# Patient Record
Sex: FEMALE | Race: WHITE | Employment: FULL TIME | URBAN - NONMETROPOLITAN AREA
[De-identification: names, ages, dates, MRNs, and addresses within clinical notes are randomized per-mention and may not be internally consistent; named-entity substitution may affect disease eponyms.]

---

## 2017-03-30 PROBLEM — L71.9 ROSACEA: Status: ACTIVE | Noted: 2017-03-30

## 2017-08-23 PROBLEM — E78.2 MIXED DYSLIPIDEMIA: Status: ACTIVE | Noted: 2017-08-23

## 2019-01-11 ENCOUNTER — OFFICE VISIT (OUTPATIENT)
Dept: CARDIOLOGY | Facility: CLINIC | Age: 29
End: 2019-01-11
Payer: COMMERCIAL

## 2019-01-11 VITALS
HEART RATE: 102 BPM | BODY MASS INDEX: 32.95 KG/M2 | HEIGHT: 58 IN | OXYGEN SATURATION: 94 % | WEIGHT: 157 LBS | DIASTOLIC BLOOD PRESSURE: 80 MMHG | SYSTOLIC BLOOD PRESSURE: 110 MMHG

## 2019-01-11 DIAGNOSIS — R07.89 OTHER CHEST PAIN: ICD-10-CM

## 2019-01-11 DIAGNOSIS — R00.2 PALPITATIONS: ICD-10-CM

## 2019-01-11 DIAGNOSIS — E78.2 MIXED DYSLIPIDEMIA: ICD-10-CM

## 2019-01-11 LAB — EKG IMPRESSION: NORMAL

## 2019-01-11 PROCEDURE — 93000 ELECTROCARDIOGRAM COMPLETE: CPT | Performed by: INTERNAL MEDICINE

## 2019-01-11 PROCEDURE — 99204 OFFICE O/P NEW MOD 45 MIN: CPT | Mod: 25 | Performed by: INTERNAL MEDICINE

## 2019-01-11 RX ORDER — LORAZEPAM 0.5 MG/1
0.5 TABLET ORAL EVERY 4 HOURS PRN
COMMUNITY
End: 2019-09-19 | Stop reason: SDUPTHER

## 2019-01-11 ASSESSMENT — ENCOUNTER SYMPTOMS
INSOMNIA: 1
FALLS: 0
NAUSEA: 0
ABDOMINAL PAIN: 0
WEAKNESS: 0
SHORTNESS OF BREATH: 1
CHILLS: 0
COUGH: 0
BRUISES/BLEEDS EASILY: 0
PALPITATIONS: 0
FOCAL WEAKNESS: 0
TINGLING: 1
PND: 0
BLURRED VISION: 0
CLAUDICATION: 0
DIZZINESS: 0
DEPRESSION: 1
NERVOUS/ANXIOUS: 1
FEVER: 0
SORE THROAT: 1

## 2019-01-11 NOTE — LETTER
[facility] [address],   [address]nd [address]  [place] [address]  Phone: [phone]  Fax: [phone]              Ellie Sánchez  1990    Encounter Date: 1/11/2019    Kelton Hinds M.D.          PROGRESS NOTE:  Chief Complaint   Patient presents with   • Chest Pain       Subjective:   Ellie Sánchez is a 28 y.o. female who presents today 17 days of in consultation from Dr. Nigel hodges for evaluation of her history of episodic chest pain where she double overs and sometimes feels like she even has to lie down this occurs in the central chest but is radiated into her jaw or her left arm and she is also found that her left arm gets numb with tingling of the hand this does not necessarily occur with emotional stress or anxiety that she feels but she certainly has prominent anxiety most of the time as well she is struggled long-term with her weight and she does in fact have mixed dyslipidemia with elevated LDL on prior testing and elevated triglycerides on more recent testing she has a family history of heart disease in multiple members.    Past Medical History:   Diagnosis Date   • Abdominal pain, left upper quadrant    • Anxiety    • Bronchitis    • Chest pain    • Cough    • Depression    • Early satiety    • Gastritis    • Helicobacter pylori (H. pylori)    • Jaw pain    • Migraine    • Musculoskeletal pain     Back   • Otitis media    • Right wrist pain    • Sinus disorder    • Sinusitis    • SOB (shortness of breath)    • Thrombocytosis (HCC)    • Tonsillitis    • URI (upper respiratory infection)    • Yeast infection      Past Surgical History:   Procedure Laterality Date   • ABDOMINAL EXPLORATION       Family History   Problem Relation Age of Onset   • Diabetes Mother    • Hypertension Father    • Diabetes Maternal Grandmother    • Heart Disease Maternal Grandmother    • Diabetes Paternal Grandmother    • Diabetes Paternal  Grandfather    • Cancer Paternal Grandfather      Social History     Social History   • Marital status: Single     Spouse name: N/A   • Number of children: N/A   • Years of education: N/A     Occupational History   • Not on file.     Social History Main Topics   • Smoking status: Never Smoker   • Smokeless tobacco: Never Used   • Alcohol use No   • Drug use: No   • Sexual activity: Yes     Partners: Male     Other Topics Concern   • Not on file     Social History Narrative   • No narrative on file     Allergies   Allergen Reactions   • Pcn [Penicillins] Rash   • Augmentin      diarrhea   • Zithromax [Azithromycin] Rash     Outpatient Encounter Prescriptions as of 1/11/2019   Medication Sig Dispense Refill   • LORazepam (ATIVAN) 0.5 MG Tab Take 0.5 mg by mouth every four hours as needed for Anxiety.     • promethazine-dextromethorphan (PROMETHAZINE-DM) 6.25-15 MG/5ML syrup Take 5 mL by mouth every four hours as needed for Cough. 240 mL 0   • amitriptyline (ELAVIL) 100 MG Tab TAKE ONE TABLET BY MOUTH AT BEDTIME 90 Tab 0   • TRI-SPRINTEC 0.18/0.215/0.25 MG-35 MCG Tab TAKE ONE TABLET BY MOUTH DAILY 84 Tab 1   • acyclovir (ZOVIRAX) 200 MG Cap Take 4 caps by mouth daily for 5 days 20 Cap 1   • fluconazole (DIFLUCAN) 150 MG tablet Take one pill now and may repeat once in 3 days if needed 2 Tab 1   • albuterol 108 (90 BASE) MCG/ACT Aero Soln inhalation aerosol Inhale 2 Puffs by mouth every 6 hours as needed for Shortness of Breath. 8.5 g 0   • ibuprofen (MOTRIN) 600 MG Tab take one tablet 3 times daily for 1 week and then use 3 times daily as needed after that 30 Tab 0   • omeprazole (PRILOSEC) 20 MG CPDR Take 1 Cap by mouth 2 times a day. 60 Cap 0   • doxycycline (VIBRAMYCIN) 100 MG Tab Take 1 Tab by mouth 2 times a day. 14 Tab 0   • buPROPion (WELLBUTRIN) 75 MG Tab 1 pill by mouth in am; call for further directions in 2 wks 30 Tab 1     No facility-administered encounter medications on file as of 1/11/2019.      Review of  "Systems   Constitutional: Negative for chills and fever.   HENT: Positive for sore throat.    Eyes: Negative for blurred vision.   Respiratory: Positive for shortness of breath. Negative for cough.    Cardiovascular: Negative for chest pain, palpitations, claudication, leg swelling and PND.   Gastrointestinal: Negative for abdominal pain and nausea.   Musculoskeletal: Positive for joint pain. Negative for falls.   Skin: Positive for rash.   Neurological: Positive for tingling. Negative for dizziness, focal weakness and weakness.   Endo/Heme/Allergies: Does not bruise/bleed easily.   Psychiatric/Behavioral: Positive for depression. The patient is nervous/anxious and has insomnia.         Objective:   /80 (BP Location: Right arm, Patient Position: Sitting)   Pulse (!) 102   Ht 1.473 m (4' 10\")   Wt 71.2 kg (157 lb)   SpO2 94%   BMI 32.81 kg/m²      Physical Exam   Constitutional: No distress.   Central obesity   HENT:   Mouth/Throat: Oropharynx is clear and moist. No oropharyngeal exudate.   Eyes: No scleral icterus.   Neck: No JVD present.   Cardiovascular: Normal rate and normal heart sounds.  Exam reveals no gallop and no friction rub.    No murmur heard.  Pulmonary/Chest: No respiratory distress. She has no wheezes. She has no rales.   Abdominal: Soft. Bowel sounds are normal.   Musculoskeletal: She exhibits no edema.   Neurological: She is alert.   Skin: No rash noted. She is not diaphoretic.   She describes a butterfly rash, but currently has makeup   Psychiatric: She has a normal mood and affect.     Results for orders placed or performed in visit on 19   EKG   Result Value Ref Range    Report       [facility] [place]    Test Date:  2019  Pt Name:    DUSTIN LEZAMA             Department: [facility]  MRN:        [id number]                      Room:  Gender:     Female                       Technician: ALEXANDER  :        1990                   Requested By:RAMONA DELEON" GUEVARA  Order #:    [id number]                    Reading MD:    Measurements  Intervals                                Axis  Rate:       90                           P:          50  WV:         128                          QRS:        79  QRSD:       84                           T:          28  QT:         356  QTc:        436    Interpretive Statements  SINUS RHYTHM  No previous ECG available for comparison         No results found for this or any previous visit.    We reviewed in person the recent labs    Assessment:     1. Other chest pain  EKG    EC-ECHOCARDIOGRAM REST/STRESS W/O CONT    Holter Monitor Study   2. Palpitations  Holter Monitor Study   3. Mixed dyslipidemia         Medical Decision Making:  Today's Assessment / Status / Plan:     It was my pleasure to meet with Ms. Sánchez.    She is accompanied by her grandmother    For her episodes of chest pain this would be atypical for angina it seems as likely panic disorder but certainly she has risk factors and so we should proceed with ischemic evaluation stress echo seems appropriate    For dyslipidemia gave her diet advice    For complaint of palpitations and racing heart she often does have tachycardia on her initial evaluations repeat pulse on EKG was 90 so again this may be related to anxiety we can do a 24-hour monitor to see if she has any occult arrhythmias    We will follow up with Ms. Sánchez on the results of the testing over the phone. We will determine further follow-up from there.    It is my pleasure to participate in the care of Ms. Sánchez.  Please do not hesitate to contact me with questions or concerns.    Kelton Hinds MD PhD FAC  Cardiologist [facility]        Jennifer Ardon D.O.  [address] [facility] [address]  VIA In Basket

## 2019-01-11 NOTE — PROGRESS NOTES
Chief Complaint   Patient presents with   • Chest Pain       Subjective:   Ellie Sánchez is a 28 y.o. female who presents today 17 days of in consultation from Dr. Nigel hodges for evaluation of her history of episodic chest pain where she double overs and sometimes feels like she even has to lie down this occurs in the central chest but is radiated into her jaw or her left arm and she is also found that her left arm gets numb with tingling of the hand this does not necessarily occur with emotional stress or anxiety that she feels but she certainly has prominent anxiety most of the time as well she is struggled long-term with her weight and she does in fact have mixed dyslipidemia with elevated LDL on prior testing and elevated triglycerides on more recent testing she has a family history of heart disease in multiple members.    Past Medical History:   Diagnosis Date   • Abdominal pain, left upper quadrant    • Anxiety    • Bronchitis    • Chest pain    • Cough    • Depression    • Early satiety    • Gastritis    • Helicobacter pylori (H. pylori)    • Jaw pain    • Migraine    • Musculoskeletal pain     Back   • Otitis media    • Right wrist pain    • Sinus disorder    • Sinusitis    • SOB (shortness of breath)    • Thrombocytosis (HCC)    • Tonsillitis    • URI (upper respiratory infection)    • Yeast infection      Past Surgical History:   Procedure Laterality Date   • ABDOMINAL EXPLORATION       Family History   Problem Relation Age of Onset   • Diabetes Mother    • Hypertension Father    • Diabetes Maternal Grandmother    • Heart Disease Maternal Grandmother    • Diabetes Paternal Grandmother    • Diabetes Paternal Grandfather    • Cancer Paternal Grandfather      Social History     Social History   • Marital status: Single     Spouse name: N/A   • Number of children: N/A   • Years of education: N/A     Occupational History   • Not on file.     Social History Main Topics   • Smoking status: Never Smoker   •  Smokeless tobacco: Never Used   • Alcohol use No   • Drug use: No   • Sexual activity: Yes     Partners: Male     Other Topics Concern   • Not on file     Social History Narrative   • No narrative on file     Allergies   Allergen Reactions   • Pcn [Penicillins] Rash   • Augmentin      diarrhea   • Zithromax [Azithromycin] Rash     Outpatient Encounter Prescriptions as of 1/11/2019   Medication Sig Dispense Refill   • LORazepam (ATIVAN) 0.5 MG Tab Take 0.5 mg by mouth every four hours as needed for Anxiety.     • promethazine-dextromethorphan (PROMETHAZINE-DM) 6.25-15 MG/5ML syrup Take 5 mL by mouth every four hours as needed for Cough. 240 mL 0   • amitriptyline (ELAVIL) 100 MG Tab TAKE ONE TABLET BY MOUTH AT BEDTIME 90 Tab 0   • TRI-SPRINTEC 0.18/0.215/0.25 MG-35 MCG Tab TAKE ONE TABLET BY MOUTH DAILY 84 Tab 1   • acyclovir (ZOVIRAX) 200 MG Cap Take 4 caps by mouth daily for 5 days 20 Cap 1   • fluconazole (DIFLUCAN) 150 MG tablet Take one pill now and may repeat once in 3 days if needed 2 Tab 1   • albuterol 108 (90 BASE) MCG/ACT Aero Soln inhalation aerosol Inhale 2 Puffs by mouth every 6 hours as needed for Shortness of Breath. 8.5 g 0   • ibuprofen (MOTRIN) 600 MG Tab take one tablet 3 times daily for 1 week and then use 3 times daily as needed after that 30 Tab 0   • omeprazole (PRILOSEC) 20 MG CPDR Take 1 Cap by mouth 2 times a day. 60 Cap 0   • doxycycline (VIBRAMYCIN) 100 MG Tab Take 1 Tab by mouth 2 times a day. 14 Tab 0   • buPROPion (WELLBUTRIN) 75 MG Tab 1 pill by mouth in am; call for further directions in 2 wks 30 Tab 1     No facility-administered encounter medications on file as of 1/11/2019.      Review of Systems   Constitutional: Negative for chills and fever.   HENT: Positive for sore throat.    Eyes: Negative for blurred vision.   Respiratory: Positive for shortness of breath. Negative for cough.    Cardiovascular: Negative for chest pain, palpitations, claudication, leg swelling and PND.  "  Gastrointestinal: Negative for abdominal pain and nausea.   Musculoskeletal: Positive for joint pain. Negative for falls.   Skin: Positive for rash.   Neurological: Positive for tingling. Negative for dizziness, focal weakness and weakness.   Endo/Heme/Allergies: Does not bruise/bleed easily.   Psychiatric/Behavioral: Positive for depression. The patient is nervous/anxious and has insomnia.         Objective:   /80 (BP Location: Right arm, Patient Position: Sitting)   Pulse (!) 102   Ht 1.473 m (4' 10\")   Wt 71.2 kg (157 lb)   SpO2 94%   BMI 32.81 kg/m²      Physical Exam   Constitutional: No distress.   Central obesity   HENT:   Mouth/Throat: Oropharynx is clear and moist. No oropharyngeal exudate.   Eyes: No scleral icterus.   Neck: No JVD present.   Cardiovascular: Normal rate and normal heart sounds.  Exam reveals no gallop and no friction rub.    No murmur heard.  Pulmonary/Chest: No respiratory distress. She has no wheezes. She has no rales.   Abdominal: Soft. Bowel sounds are normal.   Musculoskeletal: She exhibits no edema.   Neurological: She is alert.   Skin: No rash noted. She is not diaphoretic.   She describes a butterfly rash, but currently has makeup   Psychiatric: She has a normal mood and affect.     Results for orders placed or performed in visit on 19   EKG   Result Value Ref Range    Report       [facility] [place]    Test Date:  2019  Pt Name:    DUSTIN LEZAMA             Department: [facility]  MRN:        [id number]                      Room:  Gender:     Female                       Technician: ALEXANDER  :        1990                   Requested By:RAMONA WATERMAN  Order #:    [id number]                    Reading MD:    Measurements  Intervals                                Axis  Rate:       90                           P:          50  IA:         128                          QRS:        79  QRSD:       84                           T:          28  QT:    "      356  QTc:        436    Interpretive Statements  SINUS RHYTHM  No previous ECG available for comparison         No results found for this or any previous visit.    We reviewed in person the recent labs    Assessment:     1. Other chest pain  EKG    EC-ECHOCARDIOGRAM REST/STRESS W/O CONT    Holter Monitor Study   2. Palpitations  Holter Monitor Study   3. Mixed dyslipidemia         Medical Decision Making:  Today's Assessment / Status / Plan:     It was my pleasure to meet with Ms. Sánchez.    She is accompanied by her grandmother    For her episodes of chest pain this would be atypical for angina it seems as likely panic disorder but certainly she has risk factors and so we should proceed with ischemic evaluation stress echo seems appropriate    For dyslipidemia gave her diet advice    For complaint of palpitations and racing heart she often does have tachycardia on her initial evaluations repeat pulse on EKG was 90 so again this may be related to anxiety we can do a 24-hour monitor to see if she has any occult arrhythmias    We will follow up with Ms. Sánchez on the results of the testing over the phone. We will determine further follow-up from there.    It is my pleasure to participate in the care of Ms. Sánchez.  Please do not hesitate to contact me with questions or concerns.    Kelton Hinds MD PhD FAC  Cardiologist [facility]

## 2019-01-11 NOTE — PATIENT INSTRUCTIONS
Lowering total cholesterol and LDL (bad) cholesterol:  - Eat leaner cuts of meat, or eliminate altogether if possible red meat, and frequently substitute fish or chicken.  - Limit saturated fat to no more than 7-10% of total calories no more than 10 g per day is recommended. Some sources of saturated fat include butter, animal fats, hydrogenated vegetable fats and oils, many desserts, whole milk dairy products.  - Replaced saturated fats with polyunsaturated fats and monounsaturated fats. Foods high in monounsaturated fat include nuts, although well, canola oil, avocados, and olives.  - Limit trans fat (processed foods) and replaced with fresh fruits and vegetables  - Recommend nonfat dairy products  - Increase substantially the amount of soluble fiber intake (legumes such as beans, fruit, whole grains).  - Consider nutritional supplements: plant sterile spreads such as Benecol, fish oil,  flaxseed oil, omega-3 acids capsules 1000 mg twice a day, or viscous fiber such as Metamucil  - Attain ideal weight and regular exercise (at least 30 minutes per day of walking)    Lowering triglycerides:  - Reduce intake of simple sugar: Desserts, candy, pastries, honey, sodas, sugared cereals, yogurt, Gatorade, sports bars, canned fruit, smoothies, fruit juice, coffee drinks  - Reduced intake of refined starches: Refined Pasta  - Reduce or abstain from alcohol  - Increase omega-3 fatty acids: [place], Trout, Mackerel, [place]ring, [place] tuna and supplements  - Attain ideal weight and regular exercise (at least 30 minutes per day of walking)      Elevating HDL (good) cholesterol:  - Increase physical activity  - Seasoned foods with garlic and onions  - Increase omega-3 fatty acids and supplements as listed above  - Incorporating appropriate amounts of monounsaturated fats such as nuts, olive oil, canola oil, avocados, olives  - Stop smoking  - Attain ideal weight and regular exercise (at least 30 minutes per day of walking)

## 2019-01-25 ENCOUNTER — NON-PROVIDER VISIT (OUTPATIENT)
Dept: CARDIOLOGY | Facility: CLINIC | Age: 29
End: 2019-01-25
Payer: COMMERCIAL

## 2019-01-25 DIAGNOSIS — R00.0 SINUS TACHYCARDIA: ICD-10-CM

## 2019-01-25 DIAGNOSIS — R00.2 PALPITATIONS: ICD-10-CM

## 2019-01-25 DIAGNOSIS — R07.89 OTHER CHEST PAIN: ICD-10-CM

## 2019-01-25 PROCEDURE — 93224 XTRNL ECG REC UP TO 48 HRS: CPT | Performed by: INTERNAL MEDICINE

## 2019-02-01 ENCOUNTER — TELEPHONE (OUTPATIENT)
Dept: CARDIOLOGY | Facility: MEDICAL CENTER | Age: 29
End: 2019-02-01

## 2019-02-01 LAB — EKG IMPRESSION: NORMAL

## 2019-02-01 NOTE — TELEPHONE ENCOUNTER
Holter Monitor Study   Order: [id number]   Status:  Final result   Visible to patient:  Yes (MyChart)  Dx:  Palpitations; Other chest pain   Notes recorded by Kelton Hinds M.D. on 2/1/2019 at 3:09 PM PST    The holter test looks good, please let her know     Thank you     Returned patient call and reviewed MD recommendations.  She states no other concerns or questions at this time.  Pt verbalizes understanding and is appreciative of information given.

## 2019-02-05 ENCOUNTER — APPOINTMENT (OUTPATIENT)
Dept: CARDIOLOGY | Facility: MEDICAL CENTER | Age: 29
End: 2019-02-05
Attending: INTERNAL MEDICINE
Payer: COMMERCIAL

## 2019-02-19 ENCOUNTER — HOSPITAL ENCOUNTER (OUTPATIENT)
Dept: CARDIOLOGY | Facility: MEDICAL CENTER | Age: 29
End: 2019-02-19
Attending: INTERNAL MEDICINE
Payer: COMMERCIAL

## 2019-02-19 DIAGNOSIS — R07.89 OTHER CHEST PAIN: ICD-10-CM

## 2019-02-19 LAB — LV EJECT FRACT  99904: 55

## 2019-02-19 PROCEDURE — 93350 STRESS TTE ONLY: CPT | Mod: 26 | Performed by: INTERNAL MEDICINE

## 2019-02-19 PROCEDURE — 93017 CV STRESS TEST TRACING ONLY: CPT

## 2019-02-19 PROCEDURE — 93018 CV STRESS TEST I&R ONLY: CPT | Performed by: INTERNAL MEDICINE

## 2021-02-17 PROBLEM — M32.9 SLE (SYSTEMIC LUPUS ERYTHEMATOSUS RELATED SYNDROME) (HCC): Status: ACTIVE | Noted: 2021-02-17

## 2021-02-17 PROBLEM — D75.839 THROMBOCYTOSIS: Status: ACTIVE | Noted: 2021-02-17

## 2023-10-04 PROBLEM — D72.829 LEUKOCYTOSIS: Status: ACTIVE | Noted: 2023-10-04

## 2024-07-05 ENCOUNTER — APPOINTMENT (OUTPATIENT)
Dept: RADIOLOGY | Facility: MEDICAL CENTER | Age: 34
DRG: 023 | End: 2024-07-05
Attending: RADIOLOGY
Payer: MEDICAID

## 2024-07-05 ENCOUNTER — HOSPITAL ENCOUNTER (OUTPATIENT)
Dept: RADIOLOGY | Facility: MEDICAL CENTER | Age: 34
End: 2024-07-05

## 2024-07-05 ENCOUNTER — APPOINTMENT (OUTPATIENT)
Dept: CARDIOLOGY | Facility: MEDICAL CENTER | Age: 34
DRG: 023 | End: 2024-07-05
Attending: INTERNAL MEDICINE
Payer: MEDICAID

## 2024-07-05 ENCOUNTER — APPOINTMENT (OUTPATIENT)
Dept: RADIOLOGY | Facility: MEDICAL CENTER | Age: 34
DRG: 023 | End: 2024-07-05
Attending: PSYCHIATRY & NEUROLOGY
Payer: MEDICAID

## 2024-07-05 ENCOUNTER — APPOINTMENT (OUTPATIENT)
Dept: RADIOLOGY | Facility: MEDICAL CENTER | Age: 34
DRG: 023 | End: 2024-07-05
Attending: EMERGENCY MEDICINE
Payer: MEDICAID

## 2024-07-05 ENCOUNTER — APPOINTMENT (OUTPATIENT)
Dept: RADIOLOGY | Facility: MEDICAL CENTER | Age: 34
DRG: 023 | End: 2024-07-05
Attending: INTERNAL MEDICINE
Payer: MEDICAID

## 2024-07-05 ENCOUNTER — HOSPITAL ENCOUNTER (INPATIENT)
Facility: MEDICAL CENTER | Age: 34
LOS: 10 days | DRG: 023 | End: 2024-07-15
Attending: EMERGENCY MEDICINE | Admitting: INTERNAL MEDICINE
Payer: MEDICAID

## 2024-07-05 DIAGNOSIS — M25.50 ARTHRALGIA, UNSPECIFIED JOINT: ICD-10-CM

## 2024-07-05 DIAGNOSIS — E78.2 MIXED DYSLIPIDEMIA: ICD-10-CM

## 2024-07-05 DIAGNOSIS — I63.511 ACUTE ISCHEMIC RIGHT MCA STROKE (HCC): ICD-10-CM

## 2024-07-05 DIAGNOSIS — F41.9 ANXIETY: ICD-10-CM

## 2024-07-05 DIAGNOSIS — I63.411 CEREBROVASCULAR ACCIDENT (CVA) DUE TO EMBOLISM OF RIGHT MIDDLE CEREBRAL ARTERY (HCC): Primary | ICD-10-CM

## 2024-07-05 PROBLEM — E83.42 HYPOMAGNESEMIA: Status: ACTIVE | Noted: 2024-07-05

## 2024-07-05 LAB
ALBUMIN SERPL BCP-MCNC: 4.1 G/DL (ref 3.2–4.9)
ALBUMIN/GLOB SERPL: 1.4 G/DL
ALP SERPL-CCNC: 70 U/L (ref 30–99)
ALT SERPL-CCNC: 16 U/L (ref 2–50)
ANION GAP SERPL CALC-SCNC: 13 MMOL/L (ref 7–16)
APTT PPP: 21.7 SEC (ref 24.7–36)
APTT PPP: <20 SEC (ref 24.7–36)
AST SERPL-CCNC: 25 U/L (ref 12–45)
BASOPHILS # BLD AUTO: 0.2 % (ref 0–1.8)
BASOPHILS # BLD: 0.03 K/UL (ref 0–0.12)
BILIRUB SERPL-MCNC: 0.3 MG/DL (ref 0.1–1.5)
BUN SERPL-MCNC: 8 MG/DL (ref 8–22)
CALCIUM ALBUM COR SERPL-MCNC: 8.7 MG/DL (ref 8.5–10.5)
CALCIUM SERPL-MCNC: 8.8 MG/DL (ref 8.5–10.5)
CHLORIDE SERPL-SCNC: 107 MMOL/L (ref 96–112)
CO2 SERPL-SCNC: 19 MMOL/L (ref 20–33)
CREAT SERPL-MCNC: 0.59 MG/DL (ref 0.5–1.4)
EOSINOPHIL # BLD AUTO: 0.35 K/UL (ref 0–0.51)
EOSINOPHIL NFR BLD: 2.9 % (ref 0–6.9)
ERYTHROCYTE [DISTWIDTH] IN BLOOD BY AUTOMATED COUNT: 46.9 FL (ref 35.9–50)
EST. AVERAGE GLUCOSE BLD GHB EST-MCNC: 94 MG/DL
GFR SERPLBLD CREATININE-BSD FMLA CKD-EPI: 120 ML/MIN/1.73 M 2
GLOBULIN SER CALC-MCNC: 3 G/DL (ref 1.9–3.5)
GLUCOSE SERPL-MCNC: 112 MG/DL (ref 65–99)
HBA1C MFR BLD: 4.9 % (ref 4–5.6)
HCT VFR BLD AUTO: 37.9 % (ref 37–47)
HGB BLD-MCNC: 12.9 G/DL (ref 12–16)
HOLDING TUBE BB 8507: NORMAL
IMM GRANULOCYTES # BLD AUTO: 0.07 K/UL (ref 0–0.11)
IMM GRANULOCYTES NFR BLD AUTO: 0.6 % (ref 0–0.9)
INR PPP: 1 (ref 0.87–1.13)
INR PPP: 1.06 (ref 0.87–1.13)
LA PPP-IMP: ABNORMAL
LMWH PPP CHRO-ACNC: <0.1 U/ML
LYMPHOCYTES # BLD AUTO: 1.4 K/UL (ref 1–4.8)
LYMPHOCYTES NFR BLD: 11.4 % (ref 22–41)
MAGNESIUM SERPL-MCNC: 1.8 MG/DL (ref 1.5–2.5)
MCH RBC QN AUTO: 32.1 PG (ref 27–33)
MCHC RBC AUTO-ENTMCNC: 34 G/DL (ref 32.2–35.5)
MCV RBC AUTO: 94.3 FL (ref 81.4–97.8)
MONOCYTES # BLD AUTO: 0.54 K/UL (ref 0–0.85)
MONOCYTES NFR BLD AUTO: 4.4 % (ref 0–13.4)
NEUTROPHILS # BLD AUTO: 9.88 K/UL (ref 1.82–7.42)
NEUTROPHILS NFR BLD: 80.5 % (ref 44–72)
NRBC # BLD AUTO: 0 K/UL
NRBC BLD-RTO: 0 /100 WBC (ref 0–0.2)
PLATELET # BLD AUTO: 526 K/UL (ref 164–446)
PMV BLD AUTO: 9 FL (ref 9–12.9)
POTASSIUM SERPL-SCNC: 4.1 MMOL/L (ref 3.6–5.5)
PROT SERPL-MCNC: 7.1 G/DL (ref 6–8.2)
PROTHROMBIN TIME: 13.3 SEC (ref 12–14.6)
PROTHROMBIN TIME: 13.9 SEC (ref 12–14.6)
RBC # BLD AUTO: 4.02 M/UL (ref 4.2–5.4)
SCREEN DRVVT: 31.2 SEC (ref 28–48)
SODIUM SERPL-SCNC: 139 MMOL/L (ref 135–145)
WBC # BLD AUTO: 12.3 K/UL (ref 4.8–10.8)

## 2024-07-05 PROCEDURE — 36415 COLL VENOUS BLD VENIPUNCTURE: CPT

## 2024-07-05 PROCEDURE — 70496 CT ANGIOGRAPHY HEAD: CPT

## 2024-07-05 PROCEDURE — 700101 HCHG RX REV CODE 250: Performed by: INTERNAL MEDICINE

## 2024-07-05 PROCEDURE — 86146 BETA-2 GLYCOPROTEIN ANTIBODY: CPT

## 2024-07-05 PROCEDURE — 03CG3ZZ EXTIRPATION OF MATTER FROM INTRACRANIAL ARTERY, PERCUTANEOUS APPROACH: ICD-10-PCS | Performed by: RADIOLOGY

## 2024-07-05 PROCEDURE — 700111 HCHG RX REV CODE 636 W/ 250 OVERRIDE (IP): Performed by: NURSE PRACTITIONER

## 2024-07-05 PROCEDURE — 94760 N-INVAS EAR/PLS OXIMETRY 1: CPT

## 2024-07-05 PROCEDURE — 85520 HEPARIN ASSAY: CPT

## 2024-07-05 PROCEDURE — 700111 HCHG RX REV CODE 636 W/ 250 OVERRIDE (IP): Mod: JZ

## 2024-07-05 PROCEDURE — 70551 MRI BRAIN STEM W/O DYE: CPT

## 2024-07-05 PROCEDURE — 80053 COMPREHEN METABOLIC PANEL: CPT | Mod: 91

## 2024-07-05 PROCEDURE — 700117 HCHG RX CONTRAST REV CODE 255: Performed by: RADIOLOGY

## 2024-07-05 PROCEDURE — 85025 COMPLETE CBC W/AUTO DIFF WBC: CPT | Mod: 91

## 2024-07-05 PROCEDURE — 83735 ASSAY OF MAGNESIUM: CPT

## 2024-07-05 PROCEDURE — 700111 HCHG RX REV CODE 636 W/ 250 OVERRIDE (IP): Mod: JZ | Performed by: PSYCHIATRY & NEUROLOGY

## 2024-07-05 PROCEDURE — 96374 THER/PROPH/DIAG INJ IV PUSH: CPT

## 2024-07-05 PROCEDURE — 99291 CRITICAL CARE FIRST HOUR: CPT

## 2024-07-05 PROCEDURE — 0042T CT-CEREBRAL PERFUSION ANALYSIS: CPT

## 2024-07-05 PROCEDURE — 93306 TTE W/DOPPLER COMPLETE: CPT

## 2024-07-05 PROCEDURE — 86147 CARDIOLIPIN ANTIBODY EA IG: CPT

## 2024-07-05 PROCEDURE — 99255 IP/OBS CONSLTJ NEW/EST HI 80: CPT | Performed by: PSYCHIATRY & NEUROLOGY

## 2024-07-05 PROCEDURE — 85610 PROTHROMBIN TIME: CPT | Mod: 91

## 2024-07-05 PROCEDURE — 85730 THROMBOPLASTIN TIME PARTIAL: CPT | Mod: 91

## 2024-07-05 PROCEDURE — 96375 TX/PRO/DX INJ NEW DRUG ADDON: CPT

## 2024-07-05 PROCEDURE — 700105 HCHG RX REV CODE 258: Performed by: NURSE PRACTITIONER

## 2024-07-05 PROCEDURE — 700111 HCHG RX REV CODE 636 W/ 250 OVERRIDE (IP): Performed by: INTERNAL MEDICINE

## 2024-07-05 PROCEDURE — 83036 HEMOGLOBIN GLYCOSYLATED A1C: CPT

## 2024-07-05 PROCEDURE — 3E03317 INTRODUCTION OF OTHER THROMBOLYTIC INTO PERIPHERAL VEIN, PERCUTANEOUS APPROACH: ICD-10-PCS | Performed by: PSYCHIATRY & NEUROLOGY

## 2024-07-05 PROCEDURE — 700105 HCHG RX REV CODE 258: Performed by: INTERNAL MEDICINE

## 2024-07-05 PROCEDURE — C1760 CLOSURE DEV, VASC: HCPCS

## 2024-07-05 PROCEDURE — 770022 HCHG ROOM/CARE - ICU (200)

## 2024-07-05 PROCEDURE — 72191 CT ANGIOGRAPH PELV W/O&W/DYE: CPT

## 2024-07-05 PROCEDURE — 700117 HCHG RX CONTRAST REV CODE 255: Performed by: EMERGENCY MEDICINE

## 2024-07-05 PROCEDURE — 700111 HCHG RX REV CODE 636 W/ 250 OVERRIDE (IP): Mod: UD | Performed by: PSYCHIATRY & NEUROLOGY

## 2024-07-05 PROCEDURE — 85613 RUSSELL VIPER VENOM DILUTED: CPT

## 2024-07-05 PROCEDURE — 700111 HCHG RX REV CODE 636 W/ 250 OVERRIDE (IP)

## 2024-07-05 PROCEDURE — 70498 CT ANGIOGRAPHY NECK: CPT

## 2024-07-05 PROCEDURE — 71045 X-RAY EXAM CHEST 1 VIEW: CPT

## 2024-07-05 PROCEDURE — 93306 TTE W/DOPPLER COMPLETE: CPT | Mod: 26 | Performed by: INTERNAL MEDICINE

## 2024-07-05 PROCEDURE — 99291 CRITICAL CARE FIRST HOUR: CPT | Performed by: INTERNAL MEDICINE

## 2024-07-05 RX ORDER — ROSUVASTATIN CALCIUM 5 MG/1
5 TABLET, COATED ORAL EVERY EVENING
Status: DISCONTINUED | OUTPATIENT
Start: 2024-07-05 | End: 2024-07-15 | Stop reason: HOSPADM

## 2024-07-05 RX ORDER — LORAZEPAM 2 MG/ML
.5-1 INJECTION INTRAMUSCULAR EVERY 4 HOURS PRN
Status: DISCONTINUED | OUTPATIENT
Start: 2024-07-05 | End: 2024-07-07

## 2024-07-05 RX ORDER — PROCHLORPERAZINE EDISYLATE 5 MG/ML
5-10 INJECTION INTRAMUSCULAR; INTRAVENOUS EVERY 4 HOURS PRN
Status: DISCONTINUED | OUTPATIENT
Start: 2024-07-05 | End: 2024-07-15 | Stop reason: HOSPADM

## 2024-07-05 RX ORDER — LABETALOL HYDROCHLORIDE 5 MG/ML
10 INJECTION, SOLUTION INTRAVENOUS
Status: DISCONTINUED | OUTPATIENT
Start: 2024-07-05 | End: 2024-07-15 | Stop reason: HOSPADM

## 2024-07-05 RX ORDER — ONDANSETRON 4 MG/1
4 TABLET, ORALLY DISINTEGRATING ORAL EVERY 4 HOURS PRN
Status: DISCONTINUED | OUTPATIENT
Start: 2024-07-05 | End: 2024-07-15 | Stop reason: HOSPADM

## 2024-07-05 RX ORDER — HYDROCODONE BITARTRATE AND ACETAMINOPHEN 5; 325 MG/1; MG/1
1 TABLET ORAL EVERY 8 HOURS PRN
Status: DISCONTINUED | OUTPATIENT
Start: 2024-07-05 | End: 2024-07-15 | Stop reason: HOSPADM

## 2024-07-05 RX ORDER — HYDRALAZINE HYDROCHLORIDE 20 MG/ML
10 INJECTION INTRAMUSCULAR; INTRAVENOUS
Status: DISCONTINUED | OUTPATIENT
Start: 2024-07-05 | End: 2024-07-15 | Stop reason: HOSPADM

## 2024-07-05 RX ORDER — PROMETHAZINE HYDROCHLORIDE 25 MG/1
12.5-25 SUPPOSITORY RECTAL EVERY 4 HOURS PRN
Status: DISCONTINUED | OUTPATIENT
Start: 2024-07-05 | End: 2024-07-15 | Stop reason: HOSPADM

## 2024-07-05 RX ORDER — HYDROMORPHONE HYDROCHLORIDE 1 MG/ML
0.5 INJECTION, SOLUTION INTRAMUSCULAR; INTRAVENOUS; SUBCUTANEOUS ONCE
Status: COMPLETED | OUTPATIENT
Start: 2024-07-05 | End: 2024-07-05

## 2024-07-05 RX ORDER — DOCUSATE SODIUM 100 MG/1
100 CAPSULE, LIQUID FILLED ORAL 2 TIMES DAILY
COMMUNITY

## 2024-07-05 RX ORDER — SODIUM CHLORIDE 9 MG/ML
500 INJECTION, SOLUTION INTRAVENOUS
Status: ACTIVE | OUTPATIENT
Start: 2024-07-05 | End: 2024-07-05

## 2024-07-05 RX ORDER — DOCUSATE SODIUM 100 MG/1
100 CAPSULE, LIQUID FILLED ORAL 2 TIMES DAILY
Status: DISCONTINUED | OUTPATIENT
Start: 2024-07-05 | End: 2024-07-15 | Stop reason: HOSPADM

## 2024-07-05 RX ORDER — ASPIRIN 81 MG/1
81 TABLET ORAL DAILY
Status: DISCONTINUED | OUTPATIENT
Start: 2024-07-06 | End: 2024-07-15 | Stop reason: HOSPADM

## 2024-07-05 RX ORDER — HYDROCODONE BITARTRATE AND ACETAMINOPHEN 5; 325 MG/1; MG/1
1 TABLET ORAL
Status: DISCONTINUED | OUTPATIENT
Start: 2024-07-05 | End: 2024-07-05

## 2024-07-05 RX ORDER — GABAPENTIN 300 MG/1
300 CAPSULE ORAL 2 TIMES DAILY
Status: DISCONTINUED | OUTPATIENT
Start: 2024-07-05 | End: 2024-07-15 | Stop reason: HOSPADM

## 2024-07-05 RX ORDER — NOREPINEPHRINE BITARTRATE 0.03 MG/ML
0-1 INJECTION, SOLUTION INTRAVENOUS CONTINUOUS
Status: DISCONTINUED | OUTPATIENT
Start: 2024-07-05 | End: 2024-07-11

## 2024-07-05 RX ORDER — AMITRIPTYLINE HYDROCHLORIDE 100 MG/1
100 TABLET ORAL
Status: DISCONTINUED | OUTPATIENT
Start: 2024-07-05 | End: 2024-07-11

## 2024-07-05 RX ORDER — PROMETHAZINE HYDROCHLORIDE 25 MG/1
12.5-25 TABLET ORAL EVERY 4 HOURS PRN
Status: DISCONTINUED | OUTPATIENT
Start: 2024-07-05 | End: 2024-07-15 | Stop reason: HOSPADM

## 2024-07-05 RX ORDER — MIDAZOLAM HYDROCHLORIDE 1 MG/ML
.5-2 INJECTION INTRAMUSCULAR; INTRAVENOUS PRN
Status: ACTIVE | OUTPATIENT
Start: 2024-07-05 | End: 2024-07-05

## 2024-07-05 RX ORDER — MIDAZOLAM HYDROCHLORIDE 1 MG/ML
INJECTION INTRAMUSCULAR; INTRAVENOUS
Status: COMPLETED
Start: 2024-07-05 | End: 2024-07-05

## 2024-07-05 RX ORDER — ATORVASTATIN CALCIUM 80 MG/1
80 TABLET, FILM COATED ORAL EVERY EVENING
Status: DISCONTINUED | OUTPATIENT
Start: 2024-07-05 | End: 2024-07-05

## 2024-07-05 RX ORDER — SODIUM CHLORIDE, SODIUM LACTATE, POTASSIUM CHLORIDE, CALCIUM CHLORIDE 600; 310; 30; 20 MG/100ML; MG/100ML; MG/100ML; MG/100ML
INJECTION, SOLUTION INTRAVENOUS CONTINUOUS
Status: DISCONTINUED | OUTPATIENT
Start: 2024-07-05 | End: 2024-07-07

## 2024-07-05 RX ORDER — ENOXAPARIN SODIUM 100 MG/ML
1 INJECTION SUBCUTANEOUS EVERY 12 HOURS
Status: DISCONTINUED | OUTPATIENT
Start: 2024-07-05 | End: 2024-07-12

## 2024-07-05 RX ORDER — FOLIC ACID 1 MG/1
2 TABLET ORAL DAILY
Status: DISCONTINUED | OUTPATIENT
Start: 2024-07-05 | End: 2024-07-15 | Stop reason: HOSPADM

## 2024-07-05 RX ORDER — ACETAMINOPHEN 325 MG/1
650 TABLET ORAL EVERY 6 HOURS PRN
Status: DISCONTINUED | OUTPATIENT
Start: 2024-07-05 | End: 2024-07-15 | Stop reason: HOSPADM

## 2024-07-05 RX ORDER — LORAZEPAM 2 MG/ML
INJECTION INTRAMUSCULAR
Status: COMPLETED
Start: 2024-07-05 | End: 2024-07-05

## 2024-07-05 RX ORDER — HYDROXYCHLOROQUINE SULFATE 200 MG/1
200 TABLET, FILM COATED ORAL DAILY
Status: DISCONTINUED | OUTPATIENT
Start: 2024-07-05 | End: 2024-07-05

## 2024-07-05 RX ORDER — METHOTREXATE 2.5 MG/1
20 TABLET ORAL
Status: DISCONTINUED | OUTPATIENT
Start: 2024-07-05 | End: 2024-07-05

## 2024-07-05 RX ORDER — MAGNESIUM SULFATE HEPTAHYDRATE 40 MG/ML
2 INJECTION, SOLUTION INTRAVENOUS ONCE
Status: COMPLETED | OUTPATIENT
Start: 2024-07-05 | End: 2024-07-06

## 2024-07-05 RX ORDER — ONDANSETRON 2 MG/ML
4 INJECTION INTRAMUSCULAR; INTRAVENOUS EVERY 4 HOURS PRN
Status: DISCONTINUED | OUTPATIENT
Start: 2024-07-05 | End: 2024-07-15 | Stop reason: HOSPADM

## 2024-07-05 RX ADMIN — MAGNESIUM SULFATE HEPTAHYDRATE 2 G: 2 INJECTION, SOLUTION INTRAVENOUS at 22:37

## 2024-07-05 RX ADMIN — NOREPINEPHRINE BITARTRATE 0.05 MCG/KG/MIN: 1 INJECTION, SOLUTION, CONCENTRATE INTRAVENOUS at 09:36

## 2024-07-05 RX ADMIN — ONDANSETRON 4 MG: 2 INJECTION INTRAMUSCULAR; INTRAVENOUS at 18:39

## 2024-07-05 RX ADMIN — FENTANYL CITRATE 25 MCG: 50 INJECTION, SOLUTION INTRAMUSCULAR; INTRAVENOUS at 06:42

## 2024-07-05 RX ADMIN — MIDAZOLAM HYDROCHLORIDE 0.5 MG: 1 INJECTION, SOLUTION INTRAMUSCULAR; INTRAVENOUS at 06:42

## 2024-07-05 RX ADMIN — ENOXAPARIN SODIUM 80 MG: 100 INJECTION SUBCUTANEOUS at 14:35

## 2024-07-05 RX ADMIN — IOHEXOL 100 ML: 350 INJECTION, SOLUTION INTRAVENOUS at 21:20

## 2024-07-05 RX ADMIN — LORAZEPAM 1 MG: 2 INJECTION INTRAMUSCULAR; INTRAVENOUS at 20:35

## 2024-07-05 RX ADMIN — ONDANSETRON 4 MG: 2 INJECTION INTRAMUSCULAR; INTRAVENOUS at 12:13

## 2024-07-05 RX ADMIN — PROCHLORPERAZINE EDISYLATE 5 MG: 5 INJECTION INTRAMUSCULAR; INTRAVENOUS at 13:22

## 2024-07-05 RX ADMIN — NOREPINEPHRINE BITARTRATE 0.08 MCG/KG/MIN: 1 INJECTION, SOLUTION, CONCENTRATE INTRAVENOUS at 12:00

## 2024-07-05 RX ADMIN — LORAZEPAM 1 MG: 2 INJECTION INTRAMUSCULAR; INTRAVENOUS at 08:18

## 2024-07-05 RX ADMIN — IOHEXOL 40 ML: 350 INJECTION, SOLUTION INTRAVENOUS at 05:10

## 2024-07-05 RX ADMIN — HYDROMORPHONE HYDROCHLORIDE 0.5 MG: 1 INJECTION, SOLUTION INTRAMUSCULAR; INTRAVENOUS; SUBCUTANEOUS at 12:13

## 2024-07-05 RX ADMIN — SODIUM CHLORIDE, POTASSIUM CHLORIDE, SODIUM LACTATE AND CALCIUM CHLORIDE: 600; 310; 30; 20 INJECTION, SOLUTION INTRAVENOUS at 22:31

## 2024-07-05 RX ADMIN — IOHEXOL 50 ML: 300 INJECTION, SOLUTION INTRAVENOUS at 08:00

## 2024-07-05 RX ADMIN — FENTANYL CITRATE 25 MCG: 50 INJECTION, SOLUTION INTRAMUSCULAR; INTRAVENOUS at 07:15

## 2024-07-05 RX ADMIN — IOHEXOL 65 ML: 350 INJECTION, SOLUTION INTRAVENOUS at 05:12

## 2024-07-05 RX ADMIN — MIDAZOLAM HYDROCHLORIDE 0.5 MG: 1 INJECTION INTRAMUSCULAR; INTRAVENOUS at 06:42

## 2024-07-05 RX ADMIN — TENECTEPLASE 21 MG: KIT at 05:28

## 2024-07-05 ASSESSMENT — ENCOUNTER SYMPTOMS
SPEECH CHANGE: 0
DEPRESSION: 0
SPUTUM PRODUCTION: 0
MYALGIAS: 0
ABDOMINAL PAIN: 0
BLURRED VISION: 0
BRUISES/BLEEDS EASILY: 0
COUGH: 0
SHORTNESS OF BREATH: 0
NERVOUS/ANXIOUS: 0
FOCAL WEAKNESS: 1
BACK PAIN: 0
CHILLS: 0
FEVER: 0
DIZZINESS: 1
VOMITING: 0
SORE THROAT: 0
NAUSEA: 0
PALPITATIONS: 0
SENSORY CHANGE: 1
HEADACHES: 0

## 2024-07-05 ASSESSMENT — PAIN DESCRIPTION - PAIN TYPE
TYPE: ACUTE PAIN

## 2024-07-05 ASSESSMENT — FIBROSIS 4 INDEX
FIB4 SCORE: .3111111111111111111
FIB4 SCORE: 0.42

## 2024-07-05 NOTE — CONSULTS
Critical Care Consultation    Date of consult: 7/5/2024    Referring Physician  Adria Lawson M.D.    Reason for Consultation  R MCA CVA with M2 thrombus    History of Presenting Illness  33 y.o. 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  She presented to [facility] complaining of sudden onset of left-sided weakness and confusion at 2330 last night.  She was seen in the emergency department at [facility] [place] but symptoms resolved at that time.  She had a normal head CT without contrast but a CTA showed a right M3 occlusion.  She was transferred to [facility] without requiring thrombolytics.  While in the ED, she again developed symptoms and a repeat CTA at that time showed an M2 occlusion for which she was administered TNK and went to IR for mechanical thrombectomy.  Unfortunately the thrombectomy was unsuccessful and resulted in an ICA dissection and essentially TICI 0 flow.  She was brought to the intensive care unit and I was consulted for critical care management.   - sinus tach   - TNK @0528, thrombectomy 0725   - RASS (-)1, follows weaker on L side   - anxious   - SBP    - WBC elevated    Code Status  Full Code    Review of Systems  Review of Systems   Constitutional:  Negative for chills, fever and malaise/fatigue.   HENT:  Negative for congestion and sore throat.    Eyes:  Negative for blurred vision.   Respiratory:  Negative for cough, sputum production and shortness of breath.    Cardiovascular:  Negative for chest pain, palpitations and leg swelling.   Gastrointestinal:  Negative for abdominal pain, nausea and vomiting.   Genitourinary:  Negative for dysuria.   Musculoskeletal:  Negative for back pain and myalgias.   Skin:  Negative for rash.   Neurological:  Positive for dizziness, sensory change and focal weakness. Negative for  speech change and headaches.   Endo/Heme/Allergies:  Does not bruise/bleed easily.   Psychiatric/Behavioral:  Negative for depression. The patient is not nervous/anxious.    All other systems reviewed and are negative.      Past Medical History   has a past medical history of Abdominal pain, left upper quadrant, Anxiety, Bronchitis, Chest pain, Cough, Depression, Early satiety, Gastritis, Helicobacter pylori (H. pylori), Jaw pain, Migraine, Musculoskeletal pain, Otitis media, Right wrist pain, Sinus disorder, Sinusitis, SOB (shortness of breath), Thrombocytosis, Tonsillitis, URI (upper respiratory infection), and Yeast infection.    Surgical History   has a past surgical history that includes abdominal exploration.    Family History  family history includes Cancer in her paternal grandfather; Diabetes in her maternal grandmother, mother, paternal grandfather, and paternal grandmother; Heart Disease in her maternal grandmother; Hypertension in her father.    Social History   reports that she has never smoked. She has never used smokeless tobacco. She reports that she does not drink alcohol and does not use drugs.    Medications  Home Medications       Reviewed by Floresita Alves ([facility]) on 07/05/24 at 0604  Med List Status: Complete     Medication Last Dose Status   acetaminophen/caffeine/butalbital 300-40-50 mg (FIORICET) -40 MG Cap capsule unknown Active   amitriptyline (ELAVIL) 100 MG Tab unknown Active   docusate sodium (COLACE) 100 MG Cap unknown Active   folic acid (FOLVITE) 1 MG Tab unknown Active   gabapentin (NEURONTIN) 300 MG Cap unknown Active   HYDROcodone-acetaminophen (NORCO) 5-325 MG Tab per tablet unknown Active   HYDROcodone-acetaminophen (NORCO) 5-325 MG Tab per tablet new script Active   HYDROcodone-acetaminophen (NORCO) 5-325 MG Tab per tablet new script Active   hydroxychloroquine (PLAQUENIL) 200 MG Tab unknown Active   LORazepam (ATIVAN) 1 MG Tab unknown Active   methotrexate 2.5  MG tablet unknwon Active   metoprolol SR (TOPROL XL) 25 MG TABLET SR 24 HR unknown Active   metronidazole (METROCREAM) 0.75 % cream unknown Active   naproxen (NAPROSYN) 500 MG Tab unknown Active   phentermine 15 MG capsule unknown Active   rosuvastatin (CRESTOR) 5 MG Tab unknown Active   Upadacitinib ER (RINVOQ) 15 MG TABLET SR 24 HR unknown Active                  Audit from Redirected Encounters    **Home medications have not yet been reviewed for this encounter**       Current Facility-Administered Medications   Medication Dose Route Frequency Provider Last Rate Last Admin    NS (Bolus) 0.9 % infusion 500 mL  500 mL Intravenous Once PRN Preet Fernandez M.D.        fentaNYL (Sublimaze) injection 12.5-50 mcg  12.5-50 mcg Intravenous PRN Preet Fernandez M.D.   25 mcg at 07/05/24 0642    midazolam (Versed) injection 0.5-2 mg  0.5-2 mg Intravenous PRN Preet Fernandez M.D.   0.5 mg at 07/05/24 0642     Current Outpatient Medications   Medication Sig Dispense Refill    docusate sodium (COLACE) 100 MG Cap Take 100 mg by mouth 2 times a day.      phentermine 15 MG capsule Take 1 Capsule by mouth every morning for 60 days. 30 Capsule 1    acetaminophen/caffeine/butalbital 300-40-50 mg (FIORICET) -40 MG Cap capsule Take 1 Capsule by mouth 1 time a day as needed for Headache for up to 30 days. 20 Capsule 0    LORazepam (ATIVAN) 1 MG Tab Take 1 Tablet by mouth 1 time a day as needed for Anxiety (anxiousness) for up to 90 days. Do not fill till 6/8/24 30 Tablet 2    HYDROcodone-acetaminophen (NORCO) 5-325 MG Tab per tablet Take 1 Tablet by mouth 1 time a day as needed (joint pain and body aches) for up to 30 days. 30 Tablet 0    [START ON 7/6/2024] HYDROcodone-acetaminophen (NORCO) 5-325 MG Tab per tablet 1 pill by mouth once in a day with food as needed for severe joint pain and bodyaches.  Rx for 30 days.  Do not fill till 7/6/2024 30 Tablet 0    [START ON 8/5/2024] HYDROcodone-acetaminophen (NORCO) 5-325 MG Tab per tablet 1  pill by mouth once a day as needed for severe joint pain and bodyaches.  Rx for 30 days.  Do not fill till 8/5/2024. 30 Tablet 0    gabapentin (NEURONTIN) 300 MG Cap Take 1 Capsule by mouth 2 times a day. 60 Capsule 5    hydroxychloroquine (PLAQUENIL) 200 MG Tab TAKE TWO TABLETS BY MOUTH MONDAY-FRIDAY. TAKE ONE TABLET BY MOUTH DAILY ON SATURDAY AND SUNDAY 48 Tablet 5    methotrexate 2.5 MG tablet Take 8 Tablets by mouth every 7 days. 120 Tablet 0    folic acid (FOLVITE) 1 MG Tab TAKE TWO TABLETS BY MOUTH DAILY 180 Tablet 3    rosuvastatin (CRESTOR) 5 MG Tab TAKE 1/2 TO 1 TABLET BY MOUTH EVERY EVENING AS DIRECTED (Patient taking differently: 2.5-5 mg every evening. TAKE 1/2 TO 1 TABLET BY MOUTH EVERY EVENING AS DIRECTED    Alternating days) 90 Tablet 1    Upadacitinib ER (RINVOQ) 15 MG TABLET SR 24 HR Take 15 mg by mouth every day at 6 PM. 90 Tablet 1    metronidazole (METROCREAM) 0.75 % cream Apply 1 Application topically 2 times a day. 45 g 2    naproxen (NAPROSYN) 500 MG Tab Take 1 Tablet by mouth 2 times daily with meals as needed (moderate pain). 60 Tablet 5    amitriptyline (ELAVIL) 100 MG Tab TAKE ONE TABLET BY MOUTH AT BEDTIME AS NEEDED (Patient taking differently: sleep) 90 Tablet 3    metoprolol SR (TOPROL XL) 25 MG TABLET SR 24 HR 25 mg every day.         Allergies  Allergies   Allergen Reactions    Pcn [Penicillins] Rash    Augmentin Diarrhea     diarrhea    Zithromax [Azithromycin] Rash     Rash       Vital Signs last 24 hours  Temp:  [36.3 °C (97.3 °F)-36.4 °C (97.5 °F)] 36.4 °C (97.5 °F)  Pulse:  [] 93  Resp:  [15-41] 24  BP: (108-134)/(55-96) 108/96  SpO2:  [82 %-100 %] 97 %    Physical Exam  Physical Exam  Vitals and nursing note reviewed.   Constitutional:       General: She is awake. She is not in acute distress.     Appearance: Normal appearance. She is well-developed and overweight.      Interventions: Nasal cannula in place.   HENT:      Head: Normocephalic.      Comments: Bruising to left  side of forehead     Nose: Nose normal. No congestion.      Mouth/Throat:      Mouth: Mucous membranes are moist.      Pharynx: Oropharynx is clear.   Eyes:      General: No scleral icterus.     Extraocular Movements: Extraocular movements intact.      Conjunctiva/sclera: Conjunctivae normal.      Pupils: Pupils are equal, round, and reactive to light.   Neck:      Vascular: No JVD.      Trachea: No tracheal deviation.   Cardiovascular:      Rate and Rhythm: Normal rate and regular rhythm.      Pulses: Normal pulses.      Heart sounds: Normal heart sounds. No murmur heard.  Pulmonary:      Effort: Pulmonary effort is normal. No respiratory distress.      Breath sounds: Normal breath sounds. No wheezing or rales.   Abdominal:      General: Bowel sounds are normal. There is no distension.      Palpations: Abdomen is soft.      Tenderness: There is no abdominal tenderness. There is no guarding or rebound.   Musculoskeletal:         General: No tenderness.      Cervical back: Neck supple. No tenderness.      Right lower leg: No edema.      Left lower leg: No edema.      Comments: RLE puncture site with mild oozing, sand bag in place   Skin:     General: Skin is warm and dry.      Capillary Refill: Capillary refill takes less than 2 seconds.      Findings: No rash.   Neurological:      Mental Status: She is alert and oriented to person, place, and time.      Cranial Nerves: No cranial nerve deficit.      Sensory: Sensory deficit present.      Motor: Weakness present.      Comments: LUE/LLE weakness, numbness   Psychiatric:         Mood and Affect: Mood normal.         Behavior: Behavior normal. Behavior is cooperative.         Thought Content: Thought content normal.         Fluids  No intake or output data in the 24 hours ending 07/05/24 0700    Laboratory  Recent Results (from the past 48 hour(s))   CBC WITH DIFFERENTIAL    Collection Time: 07/05/24  1:40 AM   Result Value Ref Range    WBC 13.4 (H) 4.8 - 10.8 K/uL     RBC 4.28 4.20 - 5.40 M/uL    Hemoglobin 13.5 13.0 - 17.0 g/dL    Hematocrit 40.4 39.0 - 50.0 %    MCV 94.4 81.0 - 99.0 fL    MCH 31.5 (H) 27.0 - 31.0 pg    MCHC 33.4 33.0 - 37.0 g/dL    RDW 13.3 11.5 - 14.5 %    Platelet Count 585 (H) 130 - 400 K/uL    MPV 9.0 7.4 - 10.4 fL    Neutrophils Automated 69.2 39.0 - 70.0 %    Lymphocytes Automated 24.0 21.0 - 50.0 %    Monocytes Automated 5.8 2.0 - 9.0 %    Eosinophils Automated 0.4 0.0 - 5.0 %    Basophils Automated 0.4 0.0 - 3.0 %    Abs Neutrophils Automated 9.3 (H) 1.8 - 7.7 K/uL    Abs Lymph Automated 3.2 1.2 - 4.8 K/uL    Eosinophil Count, Blood 0.06 0.00 - 0.50 K/uL   COMP METABOLIC PANEL    Collection Time: 07/05/24  1:40 AM   Result Value Ref Range    Sodium 142 136 - 145 mmol/L    Potassium 3.9 3.5 - 5.1 mmol/L    Chloride 104 98 - 107 mmol/L    Co2 25 21 - 32 mmol/L    Anion Gap 17 10 - 18 mmol/L    Glucose 121 (H) 74 - 99 mg/dL    Bun 8 7 - 18 mg/dL    Creatinine 0.9 0.6 - 1.0 mg/dL    Calcium 8.9 8.5 - 11.0 mg/dL    AST(SGOT) 26 15 - 37 U/L    ALT(SGPT) 25 12 - 78 U/L    Alkaline Phosphatase 63 46 - 116 U/L    Total Bilirubin 0.5 0.2 - 1.0 mg/dL    Albumin 3.7 3.4 - 5.0 g/dL    Total Protein 8.0 6.4 - 8.2 g/dL    A-G Ratio 0.9    URINALYSIS,CULTURE IF INDICATED    Collection Time: 07/05/24  1:40 AM    Specimen: Urine, Clean Catch   Result Value Ref Range    Color YELLOW     Character Sl Cloudy (A)     Specific Gravity >=1.030 (A) 1.003 - 1.030    Ph 6.0 5.0 - 8.0    Glucose NEGATIVE Negative mg/dL    Ketones TRACE (A) Negative mg/dL    Protein TRACE (A) Negative mg/dL    Bilirubin NEGATIVE Negative    Urobilinogen, Urine 0.2 0.2 - 1.0 mg/dL    Nitrite NEGATIVE Negative    Leukocyte Esterase TRACE (A) Negative    Occult Blood NEGATIVE Negative    Culture Indicated Yes UA Culture   ESTIMATED GFR    Collection Time: 07/05/24  1:40 AM   Result Value Ref Range    GFR (CKD-EPI) 85 >60 mL/min/1.73 m 2   URINE MICROSCOPIC (W/UA)    Collection Time: 07/05/24  1:40 AM    Result Value Ref Range    WBC 6-10 (A) 0 - 6 /hpf    RBC 3-5 (A) 0 - 3 /hpf    Bacteria 2+ (A) None /hpf    Epithelial Cells 4+ (A) None /hpf   URINE CULTURE(NEW)    Collection Time: 07/05/24  1:40 AM    Specimen: Urine   Result Value Ref Range    Significant Indicator NEG     Source UR     Site Voided     Urine Culture -    POC URINE PREGNANCY    Collection Time: 07/05/24  1:48 AM   Result Value Ref Range    POC Urine Pregnancy Test Negative Negative    Internal Control Positive Positive     Internal Control Negative Negative     POC Specific Gravity >=1.030 <1.005 - >1.030   COMP METABOLIC PANEL    Collection Time: 07/05/24  4:56 AM   Result Value Ref Range    Sodium 139 135 - 145 mmol/L    Potassium 4.1 3.6 - 5.5 mmol/L    Chloride 107 96 - 112 mmol/L    Co2 19 (L) 20 - 33 mmol/L    Anion Gap 13.0 7.0 - 16.0    Glucose 112 (H) 65 - 99 mg/dL    Bun 8 8 - 22 mg/dL    Creatinine 0.59 0.50 - 1.40 mg/dL    Calcium 8.8 8.5 - 10.5 mg/dL    Correct Calcium 8.7 8.5 - 10.5 mg/dL    AST(SGOT) 25 12 - 45 U/L    ALT(SGPT) 16 2 - 50 U/L    Alkaline Phosphatase 70 30 - 99 U/L    Total Bilirubin 0.3 0.1 - 1.5 mg/dL    Albumin 4.1 3.2 - 4.9 g/dL    Total Protein 7.1 6.0 - 8.2 g/dL    Globulin 3.0 1.9 - 3.5 g/dL    A-G Ratio 1.4 g/dL   HOLD BLOOD BANK SPECIMEN (NOT TESTED)    Collection Time: 07/05/24  4:56 AM   Result Value Ref Range    Holding Tube - Bb DONE    ESTIMATED GFR    Collection Time: 07/05/24  4:56 AM   Result Value Ref Range    GFR (CKD-EPI) 120 >60 mL/min/1.73 m 2   CBC WITH DIFFERENTIAL    Collection Time: 07/05/24  5:33 AM   Result Value Ref Range    WBC 12.3 (H) 4.8 - 10.8 K/uL    RBC 4.02 (L) 4.20 - 5.40 M/uL    Hemoglobin 12.9 12.0 - 16.0 g/dL    Hematocrit 37.9 37.0 - 47.0 %    MCV 94.3 81.4 - 97.8 fL    MCH 32.1 27.0 - 33.0 pg    MCHC 34.0 32.2 - 35.5 g/dL    RDW 46.9 35.9 - 50.0 fL    Platelet Count 526 (H) 164 - 446 K/uL    MPV 9.0 9.0 - 12.9 fL    Neutrophils-Polys 80.50 (H) 44.00 - 72.00 %     Lymphocytes 11.40 (L) 22.00 - 41.00 %    Monocytes 4.40 0.00 - 13.40 %    Eosinophils 2.90 0.00 - 6.90 %    Basophils 0.20 0.00 - 1.80 %    Immature Granulocytes 0.60 0.00 - 0.90 %    Nucleated RBC 0.00 0.00 - 0.20 /100 WBC    Neutrophils (Absolute) 9.88 (H) 1.82 - 7.42 K/uL    Lymphs (Absolute) 1.40 1.00 - 4.80 K/uL    Monos (Absolute) 0.54 0.00 - 0.85 K/uL    Eos (Absolute) 0.35 0.00 - 0.51 K/uL    Baso (Absolute) 0.03 0.00 - 0.12 K/uL    Immature Granulocytes (abs) 0.07 0.00 - 0.11 K/uL    NRBC (Absolute) 0.00 K/uL       Imaging  IR-THROMBO MECHANICAL ARTERY,INIT   Final Result         33-year-old who presented with left-sided symptoms was found to have a right MCA M3 occlusion with a perfusion defect identified on CT perfusion imaging. Patient underwent emergent mechanical thrombectomy with multiple attempts to recanalize the parietal    M3 branch being unsuccessful. Final angiographic images demonstrate good antegrade flow in the MCA branches other than the occluded right MCA M3 parietal branch.      Short segment dissection of the right ICA just below the skull base was identified on the final angiogram.  Findings discussed with Dr. Fernandez and given the nonprogressive nature of the dissection without flow limitation, the dissection will be managed by    initiation of antithrombotic/anticoagulation therapy.      Final recanalization score: TICI 2 C      I, Kavita Matson was physically present and participated during the entire procedure of the IR-THROMBO MECHANICAL ARTERY,INIT.                  DX-CHEST-PORTABLE (1 VIEW)   Final Result      Mild interstitial prominence could be related to hypoinflation. No consolidation or pleural effusion.      CT-CTA NECK WITH & W/O-POST PROCESSING   Final Result      CT angiogram of the neck within normal limits.      CT-CTA HEAD WITH & W/O-POST PROCESS   Final Result      Occlusion of the superior sylvian branch M2 segment RIGHT middle cerebral artery.      These findings  "were discussed with GAIL AGUIRRE II on 7/5/2024 5:33 AM.            CT-CEREBRAL PERFUSION ANALYSIS   Final Result      1. Cerebral blood flow less than 30% possibly representing completed infarct = 5 mL. Based on distribution of this finding, this is unlikely to represent artifact.      2. T Max more than 6 seconds possibly representing combination of completed infarct and ischemia = 28 mL. Based on the distribution of this finding, this is unlikely to represent artifact.      3. Mismatched volume possibly representing ischemic brain/penumbra= 23 mL      4.  Please note that this cerebral perfusion study and report is Quantitative and targets supratentorial (cerebral) perfusion for evaluation of large vessel territory acute ischemia/infarction. For example, lacunar infarcts, and brainstem/posterior fossa    ischemia/infarction are not evaluated on this study.  Data acquisition is subject to artifacts which can yield non-anatomically plausible perfusion maps which may be due to motion, bolus timing, signal to noise ratio, or other technical factors.    Perfusion map abnormalities which show non-anatomic distributions are likely artifact.   This study is not \"stand-alone\" and should only be utilized for diagnosis, management/treatment in correlation with CT, CTA, and/or MRI and clinical factors.         EC-ECHOCARDIOGRAM COMPLETE W/O CONT    (Results Pending)   MR-BRAIN-W/O    (Results Pending)       Assessment/Plan  * Acute ischemic right MCA stroke (HCC)- (present on admission)  Assessment & Plan  With M2 occlusion s/p TNK 7/5 at 0 528, attempted thrombectomy at 0725 with no flow and right distal ICA nonobstructive dissection  Begin systemic anticoagulation with Lovenox after MRI brain as well as aspirin  Neurology and neuro IR consulted  Echocardiogram  Continue statin  Goal -160 with augmentation using norepinephrine drip as needed  Hematology consultation given hypercoagulable condition, " hypercoagulable workup ordered    SLE (systemic lupus erythematosus related syndrome) (HCC)- (present on admission)  Assessment & Plan  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  Continue methotrexate, hydroxychloroquine and Rinvoq  Hematology consultation  Monitor for antiphospholipid syndrome    Thrombocytosis- (present on admission)  Assessment & Plan  Chronic, followed by outpatient hematology in [place]    Mixed dyslipidemia- (present on admission)  Assessment & Plan  Continue rosuvastatin    Hypomagnesemia  Assessment & Plan  Replete and monitor closely    Anxiety- (present on admission)  Assessment & Plan  History of chronic anxiety treated with Ativan  Resume outpatient amitriptyline, as needed IV Ativan for now        Discussed patient condition and risk of morbidity and/or mortality with Family, RN, RT, Pharmacy, , Charge nurse / hot rounds, Patient, and neurology and oncology.    The patient remains critically ill.  Critical care time = 46 minutes in directly providing and coordinating critical care and extensive data review.  No time overlap and excludes procedures.    Please note that this dictation was created using voice recognition software. I have made every reasonable attempt to correct obvious errors, but there may be errors of grammar and possibly content that I did not discover before finalizing the note.

## 2024-07-05 NOTE — CONSULTS
Neurology STROKE CODE H&P  Neurohospitalist Service, [facility]    Referring Physician: MARCO Yadav II*    STROKE CODE:   Chief Complaint   Patient presents with    Sent by MD    Weakness     Transfer from [place] for further evaluation of unilateral weakness.        To obtain the most accurate data regarding the time called, and time patient seen, refer to the stroke run-sheet and chart.  For time of CT, refer to the radiology report. See A&P below for TPA Decision and door to needle time if and when applicable.    HPI: Ellie Sánchez is a 33 year old woman with an undefined autoimmune disorder, on multiple immunomodulating therapies, presenting with L side symptoms.  She was initially seen at [facility].  Around 1130PM while on the toilet, she suddenly developed L side weakness, confusion.  Mom called the medics, and they noted face droop on their arrival to the house.  [place] [facility] symptoms resolved by time of arrival to hospital.  Symptoms lasted about 30 minutes.  [place] [facility] [place] head CT that was a normal study.  A CT angiogram of the head with a R M3 occlusion.  No CTA of the neck was completed.  IV-thrombolytic therapy was deferred given she had returned to baseline.  On arrival to [facility], around 415AM, she appeared to be normal.  She then got up to use the bathroom, and developed anxiety, mild confusion, a left face droop, left arm weakness, and left side neglect.  NIHSS 4 as documented below.  Stroke alert activated and she was taken to CT scanner.  On ROS, denies infectious prodrome or constitutional symptoms.  On chart review, she has undefined versus seronegative rheumatoid arthritis.  She is on methotrexate, hydroxychloroquine, and rinvoq for this.  In addition, she has thrombocytosis, and is followed by cardiology for heart palpitations.    Review of systems: In addition to what is detailed in the HPI above, all  other systems reviewed and are negative.    Past Medical History:    has a past medical history of Abdominal pain, left upper quadrant, Anxiety, Bronchitis, Chest pain, Cough, Depression, Early satiety, Gastritis, Helicobacter pylori (H. pylori), Jaw pain, Migraine, Musculoskeletal pain, Otitis media, Right wrist pain, Sinus disorder, Sinusitis, SOB (shortness of breath), Thrombocytosis, Tonsillitis, URI (upper respiratory infection), and Yeast infection.    FHx:  family history includes Cancer in her paternal grandfather; Diabetes in her maternal grandmother, mother, paternal grandfather, and paternal grandmother; Heart Disease in her maternal grandmother; Hypertension in her father.    SHx:   reports that she has never smoked. She has never used smokeless tobacco. She reports that she does not drink alcohol and does not use drugs.    Allergies:  Allergies   Allergen Reactions    Pcn [Penicillins] Rash    Augmentin Diarrhea     diarrhea    Zithromax [Azithromycin] Rash     Rash       Medications:  No current facility-administered medications for this encounter.    Current Outpatient Medications:     phentermine 15 MG capsule, Take 1 Capsule by mouth every morning for 60 days., Disp: 30 Capsule, Rfl: 1    acetaminophen/caffeine/butalbital 300-40-50 mg (FIORICET) -40 MG Cap capsule, Take 1 Capsule by mouth 1 time a day as needed for Headache for up to 30 days., Disp: 20 Capsule, Rfl: 0    LORazepam (ATIVAN) 1 MG Tab, Take 1 Tablet by mouth 1 time a day as needed for Anxiety (anxiousness) for up to 90 days. Do not fill till 6/8/24, Disp: 30 Tablet, Rfl: 2    HYDROcodone-acetaminophen (NORCO) 5-325 MG Tab per tablet, Take 1 Tablet by mouth 1 time a day as needed (joint pain and body aches) for up to 30 days., Disp: 30 Tablet, Rfl: 0    [START ON 7/6/2024] HYDROcodone-acetaminophen (NORCO) 5-325 MG Tab per tablet, 1 pill by mouth once in a day with food as needed for severe joint pain and bodyaches.  Rx for 30  days.  Do not fill till 7/6/2024, Disp: 30 Tablet, Rfl: 0    [START ON 8/5/2024] HYDROcodone-acetaminophen (NORCO) 5-325 MG Tab per tablet, 1 pill by mouth once a day as needed for severe joint pain and bodyaches.  Rx for 30 days.  Do not fill till 8/5/2024., Disp: 30 Tablet, Rfl: 0    gabapentin (NEURONTIN) 300 MG Cap, Take 1 Capsule by mouth 2 times a day., Disp: 60 Capsule, Rfl: 5    hydroxychloroquine (PLAQUENIL) 200 MG Tab, TAKE TWO TABLETS BY MOUTH MONDAY-FRIDAY. TAKE ONE TABLET BY MOUTH DAILY ON SATURDAY AND SUNDAY, Disp: 48 Tablet, Rfl: 5    methotrexate 2.5 MG tablet, Take 8 Tablets by mouth every 7 days., Disp: 120 Tablet, Rfl: 0    Norgestim-Eth Estrad Triphasic (TRI-SPRINTEC) 0.18/0.215/0.25 MG-35 MCG Tab, Take 1 Tablet by mouth every day., Disp: 84 Tablet, Rfl: 0    folic acid (FOLVITE) 1 MG Tab, TAKE TWO TABLETS BY MOUTH DAILY, Disp: 180 Tablet, Rfl: 3    rosuvastatin (CRESTOR) 5 MG Tab, TAKE 1/2 TO 1 TABLET BY MOUTH EVERY EVENING AS DIRECTED, Disp: 90 Tablet, Rfl: 1    fluconazole (DIFLUCAN) 100 MG Tab, Take first tablet on first day of symptoms. Take second dose 72 hours later if symptoms persist., Disp: 2 Tablet, Rfl: 0    Upadacitinib ER (RINVOQ) 15 MG TABLET SR 24 HR, Take 15 mg by mouth every day at 6 PM., Disp: 90 Tablet, Rfl: 1    metronidazole (METROCREAM) 0.75 % cream, Apply 1 Application topically 2 times a day., Disp: 45 g, Rfl: 2    Ivermectin 1 % Cream, Apply 1 Application topically every day at 6 PM., Disp: 30 g, Rfl: 1    naproxen (NAPROSYN) 500 MG Tab, Take 1 Tablet by mouth 2 times daily with meals as needed (moderate pain)., Disp: 60 Tablet, Rfl: 5    amitriptyline (ELAVIL) 100 MG Tab, TAKE ONE TABLET BY MOUTH AT BEDTIME AS NEEDED, Disp: 90 Tablet, Rfl: 3    metoprolol SR (TOPROL XL) 25 MG TABLET SR 24 HR, , Disp: , Rfl:     Cholecalciferol (VITAMIN D3 GUMMIES ADULT PO), Take  by mouth., Disp: , Rfl:     acyclovir (ZOVIRAX) 200 MG Cap, TAKE 4 CAPSULES BY MOUTH ONCE DAILY FOR 5 DAYS,  "Disp: 20 Capsule, Rfl: 1    cyanocobalamin (VITAMIN B12) 1000 MCG Tab, Take 2 Tablets by mouth every day., Disp: 180 Tablet, Rfl: 1    albuterol 108 (90 BASE) MCG/ACT Aero Soln inhalation aerosol, Inhale 2 Puffs by mouth every 6 hours as needed for Shortness of Breath., Disp: 8.5 g, Rfl: 0    Physical Examination:    Vitals:    07/05/24 0415 07/05/24 0421   BP:  133/84   Pulse:  98   Resp:  18   Temp:  36.4 °C (97.5 °F)   TempSrc:  Temporal   SpO2:  98%   Weight: 83 kg (183 lb)    Height: 1.473 m (4' 10\")          General: Patient is awake, she is anxious  Eye: Examination of optic disks not indicated at this time given acuity of consult  Neck: There is normal range of motion  CV: Regular rate   Extremities:  Clear, dry, intact, without peripheral edema    NEUROLOGICAL EXAM:     Mental status: Awake, anxious, oriented  Speech and language: Speech is clear and fluent. The patient is able to name and repeat, and follow commands  Cranial nerve exam:  Visual fields are full. There is no nystagmus. Extraocular muscles are intact. There is a L face droop   Motor exam: There is sustained antigravity with no downward drift in R and bilateral legs.   Her LUE is antigravity with drift.    Sensory exam:  Reacts to tactile in all 4 distal extremities, there is L side neglect.  There is right/left confusion  Coordination: No ataxia on elicited movements  Gait: Deferred due to patient preference.    NIHSS: National Institutes of Health Stroke Scale    [0] 1a:Level of Consciousness    0-alert 1-drowsy   2-stupor   3-coma  [0] 1b:LOC Questions                  0-both  1-one      2-neither  [0] 1c:LOC Commands                   0-both  1-one      2-neither  [0] 2: Best Gaze                     0-nl    1-partial  2-forced  [0] 3: Visual Fields                   0-nl    1-partial  2-complete 3-bilat  [2] 4: Facial Paresis                0-nl    1-minor    2-partial  3-full  MOTOR                       0-nl  [0] 5: Right Arm         "   1-drift  [1] 6: Left Arm             2-some effort vs gravity  [0] 7: Right Leg           3-no effort vs gravity  [0] 8: Left Leg             4-no movement                             x-untestable  [0] 9: Limb Ataxia                    0-abs   1-1_limb   2-2+_limbs       x-untestable  [0] 10:Sensory                        0-nl    1-partial  2-dense  [0] 11:Best Language/Aphasia         0-nl    1-mild/mod 2-severe   3-mute  [0] 12:Dysarthria                     0-nl    1-mild/mod 2-severe       x-untestable  [1] 13:Neglect/Inattention            0-none  1-partial  2-complete  [4] TOTAL    Baseline Modified [place] Scale (MRS): 1 = No significant disability, despite symptoms; able to perform all usual duties and activities    Objective Data:    Labs:  Lab Results   Component Value Date/Time    PROTHROMBTM 12.5 10/26/2015 07:35 AM    INR 0.9 10/26/2015 07:35 AM      Lab Results   Component Value Date/Time    WBC 13.4 (H) 07/05/2024 01:40 AM    RBC 4.28 07/05/2024 01:40 AM    HEMOGLOBIN 13.5 07/05/2024 01:40 AM    HEMATOCRIT 40.4 07/05/2024 01:40 AM    MCV 94.4 07/05/2024 01:40 AM    MCH 31.5 (H) 07/05/2024 01:40 AM    MCHC 33.4 07/05/2024 01:40 AM    MPV 9.0 07/05/2024 01:40 AM    NEUTSPOLYS 41.7 05/21/2024 10:47 AM    LYMPHOCYTES 50.1 (H) 05/21/2024 10:47 AM    MONOCYTES 6.9 05/21/2024 10:47 AM    EOSINOPHILS 0.6 05/21/2024 10:47 AM    BASOPHILS 0.7 05/21/2024 10:47 AM      Lab Results   Component Value Date/Time    SODIUM 142 07/05/2024 01:40 AM    POTASSIUM 3.9 07/05/2024 01:40 AM    CHLORIDE 104 07/05/2024 01:40 AM    CO2 25 07/05/2024 01:40 AM    GLUCOSE 121 (H) 07/05/2024 01:40 AM    BUN 8 07/05/2024 01:40 AM    CREATININE 0.9 07/05/2024 01:40 AM    BUNCREATRAT 18 05/26/2017 07:09 AM    GLOMRATE 95 10/31/2023 10:46 AM      Lab Results   Component Value Date/Time    CHOLSTRLTOT 244 (H) 05/26/2017 07:09 AM    CHOLSTRLTOT 198 05/07/2012 12:00 AM     (H) 05/26/2017 07:09 AM     05/07/2012 12:00 AM     "HDL 69 05/26/2017 07:09 AM    HDL 44 05/07/2012 12:00 AM    TRIGLYCERIDE 246 (H) 05/26/2017 07:09 AM    TRIGLYCERIDE 80 05/07/2012 12:00 AM       Lab Results   Component Value Date/Time    ALKPHOSPHAT 63 07/05/2024 01:40 AM    ASTSGOT 26 07/05/2024 01:40 AM    ALTSGPT 25 07/05/2024 01:40 AM    TBILIRUBIN 0.5 07/05/2024 01:40 AM        Imaging/Testing:    I interpreted and/or reviewed the patient's neuroimaging    CT-CTA NECK WITH & W/O-POST PROCESSING   Final Result      CT angiogram of the neck within normal limits.      CT-CTA HEAD WITH & W/O-POST PROCESS   Final Result      Occlusion of the superior sylvian branch M2 segment RIGHT middle cerebral artery.      These findings were discussed with GAIL AGUIRRE II on 7/5/2024 5:33 AM.            CT-CEREBRAL PERFUSION ANALYSIS   Final Result      1. Cerebral blood flow less than 30% possibly representing completed infarct = 5 mL. Based on distribution of this finding, this is unlikely to represent artifact.      2. T Max more than 6 seconds possibly representing combination of completed infarct and ischemia = 28 mL. Based on the distribution of this finding, this is unlikely to represent artifact.      3. Mismatched volume possibly representing ischemic brain/penumbra= 23 mL      4.  Please note that this cerebral perfusion study and report is Quantitative and targets supratentorial (cerebral) perfusion for evaluation of large vessel territory acute ischemia/infarction. For example, lacunar infarcts, and brainstem/posterior fossa    ischemia/infarction are not evaluated on this study.  Data acquisition is subject to artifacts which can yield non-anatomically plausible perfusion maps which may be due to motion, bolus timing, signal to noise ratio, or other technical factors.    Perfusion map abnormalities which show non-anatomic distributions are likely artifact.   This study is not \"stand-alone\" and should only be utilized for diagnosis, management/treatment in " correlation with CT, CTA, and/or MRI and clinical factors.         DX-CHEST-PORTABLE (1 VIEW)    (Results Pending)   IR-THROMBO MECHANICAL ARTERY,INIT    (Results Pending)       Assessment and Plan:  Ellie Sánchez is a 33 year-old woman with autoimmune disorder, presenting with recurrent R MCA syndrome, now with evidence of a R M2 thrombus on stroke protocol CT.   Discussed risks, benefits, and alternative of IV-TNK with mom (Brandi) and patient, and consensus is to proceed with thrombolytic therapy.  Discussed with Neuro-IR and will proceed to OR for endovascular clot retrieval.  Admit to RICU post-operatively for post-TNK/thrombectomy care.  Unclear etiology of stroke- but given history, most likely related to hypercoagulability from her autoimmune disorder.  Will recommend anticoagulation.  Needs evaluation for antiphospholipid syndrome- as this warrants coumadin therapy as oppose to DOAC.    Problem list:   Right MCA stroke   Undefined autoimmunity    Recommendations:   - I recommend IV-TNK, given at 0528  - proceed to OR with Neuro-IR for endovascular clot retrieval  - admit to RICU post-operatively, neurochecks/NIHSS per post-TNK/thrombectomy protocol  - BP goal typically determined by TICI score, however she remains normotensive throughout clinical course.  Will determine need for pressors and increased BP goal if reperfusion is not attained   - expedite MRI brain without contrast   - no need to repeat head CT unless there is clinical deterioration  - no antithrombotics, SCDs only for first 24 hours, anticipate starting anticoagulation for long-term stroke prevention- agent and timing TBD pending MRI and lab results  - stroke labs:  HgbA1c and lipid panel   - ok to continue home crestor 5mg, will aim for LDL goal < 70   - attain hypercoagulable labs:    - antiphospholipid panel (anti-cardiolipin, anti-[id number], lupus anticoagulant)    - Factor II mutation, Factor V Leiden, Antithrombin III  assay   - complete embolic evaluation with TTE with bubble study, ziopatch monitoring at discharge   - PT/OT/SLP ok even within first 24 hours    Addendum:  Persistent M2/M3 clot.  In addition, there is a non-flow limiting R ICA dissection.  Will set BP goal to 130-160, start vasopressors as needed.  Bedrest only today.  Please expedite MRI brain- start heparin (no bolus protocol) +ASA 81mg daily if no large infarct core or significant hemorrhage as high risk for recurrent events- both from underlying hypercoagulability and thromboembolus from the dissection.    Patient discussed with Dr. Lawson, ER attending, and Dr. Matson, Neuro-IR attending.        Preet Fernandez MD  Vascular Neurology

## 2024-07-05 NOTE — ASSESSMENT & PLAN NOTE
History of chronic anxiety treated with Ativan, gabapentin  Continue gabapentin  Reassurance  Increase nightly Seroquel to 50mg PO  Will start Lexapro 10mg PO daily  I am actively titrating Precedex for RASS goals of -1 to +1

## 2024-07-05 NOTE — PROGRESS NOTES
IR Note    Cerebral angiogram with mechanical thrombectomy for R MCA occlusion by Dr. Matson.  Emergent consent. Pre-procedure pedal pulses 2+. Right groin access site. Pt placed on monitor, prepped and draped in a sterile fashion. Vital signs were taken every 5 minutes and remained within parameters (see doc flow sheets). suction system was used to retrieve clot.      Report given to Carlene ZENDEJAS. Pt was transported to ICU with RN and monitor to R102.    Hemostasis archived at 0828    LKW:2330  on 7/4/24  NIH:4    TNK: 21 mg at 0528    Time in IR:0616  Access:0641  1st angio:0646  1st pass:0649  2nd pass 0657  3rd pass 0707      Closure (end time):0725  Held pressure for 20 min    Goal SBP per MD: <140    TICI score 2 C  @ 0725    Post procedure pedal pulses 2

## 2024-07-05 NOTE — ED TRIAGE NOTES
Chief Complaint   Patient presents with    Sent by MD    Weakness     Transfer from [place] for further evaluation of unilateral weakness.     Aox4, GCS 15.     Pt ambulatory on arrival. Pt was walking to the restroom when she suddenly felt weak to her left side. Weakness has resolved. -stroke scale, -pain    LKW: 2300 on 7/4     CT pta: Diagnosed with right MCA occlusion at [place].    Hx: RA    No interventions by EMS during transfer pta.      Pt took 81mg ASA prior to going to [place].

## 2024-07-05 NOTE — CONSULTS
RCC    33-year-old woman with autoimmune disorder with recurrent R MCA CVA with R M2 thrombus on CTA.  IV TNK administered at 528 and patient now and IR.  Dr. Fernandez evaluating see his consultation note.  Case reviewed with nursing staff in the ER, patient protecting her airway and is normotensive and anxiety has improved.  Critical care team will see post IR in the [facility] ICU.

## 2024-07-05 NOTE — PROGRESS NOTES
4 Eyes Skin Assessment Completed by CRISTIANA Concepcion and CRISTIANA Wan.    Head Bruising and Edema - left forehead  Ears WDL  Nose WDL  Mouth WDL  Neck WDL  Breast/Chest WDL  Shoulder Blades WDL  Spine WDL  (R) Arm/Elbow/Hand WDL  (L) Arm/Elbow/Hand Redness, Bruising, and Abrasion - elbow  Abdomen WDL  Groin Incision - left groin site  Scrotum/Coccyx/Buttocks WDL  (R) Leg WDL  (L) Leg WDL  (R) Heel/Foot/Toe WDL  (L) Heel/Foot/Toe WDL          Devices In Places ECG, Blood Pressure Cuff, Pulse Ox, and SCD's      Interventions In Place Pillows, Low Air Loss Mattress, and Heels Loaded W/Pillows    Possible Skin Injury No    Pictures Uploaded Into Epic YES  Wound Consult Placed N/A  RN Wound Prevention Protocol Ordered No

## 2024-07-05 NOTE — ED NOTES
Med rec updated and complete. Allergies reviewed. Per prescription bottles at bedside and interview with family.  Last fill of pts birth control (tri-sprintec) 05/2024. New recent start of phentermine June 2024.  Pt alternates taking 2.5 mg and 5 mg of rosuvastatin.  Rinvoq. is currently at bedside.    No anticoagulant medications.  No outpatient antibiotic use in last 30 days.    Home pharmacy  [facility] = [phone]

## 2024-07-05 NOTE — OR SURGEON
Immediate Post- Operative Note        Findings: Right MCA M2/3 parietal branch occlusion. Final recanalization score TICI 2C.      Procedure(s): Cerebral angiogram and mechanical thrombectomy      Estimated Blood Loss: ~100  ml        Complications: Non flow limiting right distal ICA dissection, discussed with Dr. Fernandez, will manage with anticoagulation post procedure.      Discussed with Dr. Fernandez and with Brandi Sánchez, patient's mother.      7/5/2024     7:31 AM     Kavita Matson M.D.

## 2024-07-05 NOTE — ASSESSMENT & PLAN NOTE
Followed by Dr. Morales for Lupus, RA, and thrombocytosis  Continue hydroxychloroquine and will stop Rinvoq due to increased cardiovascular phenomenon   Hematology consulted

## 2024-07-05 NOTE — CARE PLAN
The patient is Watcher - Medium risk of patient condition declining or worsening         Progress made toward(s) clinical / shift goals:        Problem: Pain - Standard  Goal: Alleviation of pain or a reduction in pain to the patient’s comfort goal  Outcome: Progressing     Problem: Hemodynamic Monitoring  Goal: Patient's hemodynamics, fluid balance and neurologic status will be stable or improve  Outcome: Progressing       Patient is not progressing towards the following goals:      Problem: Neuro Status  Goal: Neuro status will remain stable or improve  Outcome: Not Progressing

## 2024-07-05 NOTE — PROGRESS NOTES
Stroke neuro note    MRI brain reviewed.  There is evolving R MCA stroke- modest burden with petechial hemorrhagic conversion versus clot propagation on gradient ECHO.  Given possible clot propagation, I would recommend we imminently start anticoagulation (therapeutic lovenox per hematology) to best salvage remainder of R MCA territory.  Maintain acute hypertensive response at SBP goal 130-160 as I do think there is still significant ischemic penumbra.  Will continue daily ASA to protect from thromboembolic events related to her R ICA dissection per Neuro-IR.    Discussed with Dr. Gonda, ICU attending.    Preet Fernandez MD   Stroke Neurology

## 2024-07-05 NOTE — ED PROVIDER NOTES
"ER Provider Note    Scribed for Adria Lawson Ii, M.d. by Marge Conrad. 7/5/2024  4:21 AM    Primary Care Provider: Jennifer Ardon D.O.    CHIEF COMPLAINT   Chief Complaint   Patient presents with    Sent by MD    Weakness     Transfer from [facility] for further evaluation of unilateral weakness.      EXTERNAL RECORDS REVIEWED  From [facility] records, CTA head revealed \"right MCA occlusion near junction of M2 and M3 segments in posterior right frontal region\". She was given aspirin. Our Neurovascular provider, Dr. Cabral consulted. Transferred here for further imaging and higher level of care.     HPI/ROS  LIMITATION TO HISTORY   Select: : None  OUTSIDE HISTORIAN(S):  None    Ellie Sánchez is a 33 y.o. female who presents to the ED as a transfer patient from [facility] following a finding from CTA head of \"right MCA occlusion near junction of M2 and M3 segments in posterior right frontal region\". She explains that she was originally seen at [facility] following an event where she became weak after going to the restroom. It was noted that the patient fell during this event, and obtained left sided weakness for 30 minutes. She states she did not experience any neck or head pain during this incident.     PAST MEDICAL HISTORY  Past Medical History:   Diagnosis Date    Abdominal pain, left upper quadrant     Anxiety     Bronchitis     Chest pain     Cough     Depression     Early satiety     Gastritis     Helicobacter pylori (H. pylori)     Jaw pain     Migraine     Musculoskeletal pain     Back    Otitis media     Right wrist pain     Sinus disorder     Sinusitis     SOB (shortness of breath)     Thrombocytosis     Tonsillitis     URI (upper respiratory infection)     Yeast infection        SURGICAL HISTORY  Past Surgical History:   Procedure Laterality Date    ABDOMINAL EXPLORATION         FAMILY HISTORY  Family History   Problem Relation Age of Onset    Diabetes Mother     Hypertension " Father     Diabetes Maternal Grandmother     Heart Disease Maternal Grandmother     Diabetes Paternal Grandmother     Diabetes Paternal Grandfather     Cancer Paternal Grandfather        SOCIAL HISTORY   reports that she has never smoked. She has never used smokeless tobacco. She reports that she does not drink alcohol and does not use drugs.    CURRENT MEDICATIONS  Previous Medications    ACETAMINOPHEN/CAFFEINE/BUTALBITAL 300-40-50 MG (FIORICET) -40 MG CAP CAPSULE    Take 1 Capsule by mouth 1 time a day as needed for Headache for up to 30 days.    AMITRIPTYLINE (ELAVIL) 100 MG TAB    TAKE ONE TABLET BY MOUTH AT BEDTIME AS NEEDED    DOCUSATE SODIUM (COLACE) 100 MG CAP    Take 100 mg by mouth 2 times a day.    FOLIC ACID (FOLVITE) 1 MG TAB    TAKE TWO TABLETS BY MOUTH DAILY    GABAPENTIN (NEURONTIN) 300 MG CAP    Take 1 Capsule by mouth 2 times a day.    HYDROCODONE-ACETAMINOPHEN (NORCO) 5-325 MG TAB PER TABLET    Take 1 Tablet by mouth 1 time a day as needed (joint pain and body aches) for up to 30 days.    HYDROCODONE-ACETAMINOPHEN (NORCO) 5-325 MG TAB PER TABLET    1 pill by mouth once in a day with food as needed for severe joint pain and bodyaches.  Rx for 30 days.  Do not fill till 7/6/2024    HYDROCODONE-ACETAMINOPHEN (NORCO) 5-325 MG TAB PER TABLET    1 pill by mouth once a day as needed for severe joint pain and bodyaches.  Rx for 30 days.  Do not fill till 8/5/2024.    HYDROXYCHLOROQUINE (PLAQUENIL) 200 MG TAB    TAKE TWO TABLETS BY MOUTH MONDAY-FRIDAY. TAKE ONE TABLET BY MOUTH DAILY ON SATURDAY AND SUNDAY    LORAZEPAM (ATIVAN) 1 MG TAB    Take 1 Tablet by mouth 1 time a day as needed for Anxiety (anxiousness) for up to 90 days. Do not fill till 6/8/24    METHOTREXATE 2.5 MG TABLET    Take 8 Tablets by mouth every 7 days.    METOPROLOL SR (TOPROL XL) 25 MG TABLET SR 24 HR    25 mg every day.    METRONIDAZOLE (METROCREAM) 0.75 % CREAM    Apply 1 Application topically 2 times a day.    NAPROXEN  "(NAPROSYN) 500 MG TAB    Take 1 Tablet by mouth 2 times daily with meals as needed (moderate pain).    PHENTERMINE 15 MG CAPSULE    Take 1 Capsule by mouth every morning for 60 days.    ROSUVASTATIN (CRESTOR) 5 MG TAB    TAKE 1/2 TO 1 TABLET BY MOUTH EVERY EVENING AS DIRECTED    UPADACITINIB ER (RINVOQ) 15 MG TABLET SR 24 HR    Take 15 mg by mouth every day at 6 PM.       ALLERGIES  Pcn [penicillins], Augmentin, and Zithromax [azithromycin]    PHYSICAL EXAM  /84   Pulse 98   Temp 36.4 °C (97.5 °F) (Temporal)   Resp 18   Ht 1.473 m (4' 10\")   Wt 83 kg (183 lb)   LMP 06/12/2024 (Exact Date)   SpO2 98%   BMI 38.25 kg/m²   Physical Exam  Vitals and nursing note reviewed.   Constitutional:       Appearance: Normal appearance.   HENT:      Head: Normocephalic.   Cardiovascular:      Rate and Rhythm: Normal rate and regular rhythm.   Pulmonary:      Effort: Pulmonary effort is normal.      Breath sounds: Normal breath sounds.   Abdominal:      General: There is no distension.      Tenderness: There is no abdominal tenderness.   Neurological:      Mental Status: She is alert.      Comments: Aox4. Clear speech. No aphasia. 5/5 strength in all extremities. Normal vision and eye movements. No sensory loss.    Psychiatric:         Mood and Affect: Mood normal.           DIAGNOSTIC STUDIES    RADIOLOGY/PROCEDURES     Radiologist interpretation:  CT-CTA NECK WITH & W/O-POST PROCESSING   Final Result      CT angiogram of the neck within normal limits.      CT-CTA HEAD WITH & W/O-POST PROCESS   Final Result      Occlusion of the superior sylvian branch M2 segment RIGHT middle cerebral artery.      These findings were discussed with GALI AGUIRRE II on 7/5/2024 5:33 AM.            CT-CEREBRAL PERFUSION ANALYSIS   Final Result      1. Cerebral blood flow less than 30% possibly representing completed infarct = 5 mL. Based on distribution of this finding, this is unlikely to represent artifact.      2. T Max more " "than 6 seconds possibly representing combination of completed infarct and ischemia = 28 mL. Based on the distribution of this finding, this is unlikely to represent artifact.      3. Mismatched volume possibly representing ischemic brain/penumbra= 23 mL      4.  Please note that this cerebral perfusion study and report is Quantitative and targets supratentorial (cerebral) perfusion for evaluation of large vessel territory acute ischemia/infarction. For example, lacunar infarcts, and brainstem/posterior fossa    ischemia/infarction are not evaluated on this study.  Data acquisition is subject to artifacts which can yield non-anatomically plausible perfusion maps which may be due to motion, bolus timing, signal to noise ratio, or other technical factors.    Perfusion map abnormalities which show non-anatomic distributions are likely artifact.   This study is not \"stand-alone\" and should only be utilized for diagnosis, management/treatment in correlation with CT, CTA, and/or MRI and clinical factors.         DX-CHEST-PORTABLE (1 VIEW)    (Results Pending)   IR-THROMBO MECHANICAL ARTERY,INIT    (Results Pending)       COURSE & MEDICAL DECISION MAKING     ASSESSMENT, COURSE AND PLAN  Care Narrative: This is an evaluation of a 33 year old woman with autoimmune disease, RA who [place] [facility] [place] [facility]. [place] had left sided deficits that resolved at outside facility. CT showed occlusion at distal  right MCA in M2, M3 segments. She was given aspirin and transferred here. On arrival she has NIHSS of 0. No neurologic deficits. Per Dr. Cabral's recommendations, CTA neck and perfusion study ordered.         4:48 AM- Nursing staff informed me that the patient had a change in mental status, nurse had to get her out of bed to use the restroom, and her gait was unsteady. I went back into the room, and she seemed disoriented. She had left sided facial droop, neglecting her left side. Stroke activation " initiated.     4:53 AM- I spoke with Dr. Cabral (Neurology) who states he is coming down to see the patient, and we are going to get full stroke imaging CT now.     5:04 AM- Dr. Cabral (Neurology) and I present at bedside and examined the patient. NIHSS score is 4 (facial droop, left sided weakness, and neglect)    5:39 AM- CT showed new area of embolism, an occlusion at the right M2 of MCA. The Radiologist notified me of this, Dr. Cabral is also aware. She is getting TNK, and going for IR intervention. Will talk to Intensivist.     6:28 AM- Dr. Dillard (Intensivist) has admitted her to the ICU.     CRITICAL CARE  The very real possibilty of a deterioration of this patient's condition required the highest level of my preparedness for sudden, emergent intervention.  I provided critical care services, which included medication orders, frequent reevaluations of the patient's condition and response to treatment, ordering and reviewing test results, and discussing the case with various consultants.  The critical care time associated with the care of the patient was 35 minutes. Review chart for interventions. This time is exclusive of any other billable procedures.        PROBLEM LIST  #CVA    DISPOSITION AND DISCUSSIONS  I have discussed management of the patient with the following physicians and ETIENNE's:  Dr. Cabral (Neurology) Dr. Dillard (Intensivist)     Discussion of management with other [facility] or appropriate source(s): Radiologist regarding CT new findings          FINAL DIANGOSIS  1. Cerebrovascular accident (CVA) due to embolism of right middle cerebral artery (HCC) Active          The note accurately reflects work and decisions made by me.  Adria Lawson II, M.D.  7/5/2024  6:39 AM

## 2024-07-05 NOTE — ASSESSMENT & PLAN NOTE
With M2 occlusion s/p TNK 7/5 at 0528, attempted thrombectomy at 0725 with no flow and right distal ICA dissection  Continue systemic anticoagulation with Lovenox, eventual transition to coumadin vs NOAC (heme consulted) Continue antiplatelet with aspirin  Neurology, NSG and neuro IR consulted, peak swelling window is closed   Continue statin  Goal -160   S/p 3% infusion, salt tabs 3g every 8 hours  Hematology consulted given hypercoagulable condition, hypercoagulable workup pending  PT/OT/SLP   PM&R consulted-->advised Baclofen 5mg TID and started for increased tone to LUE

## 2024-07-05 NOTE — DISCHARGE PLANNING
[facility] Transitional Care Coordination    Referral from: Dr. Gonda    Insurance Provider on Facesheet: [facility] FFS    Potential Rehab Diagnosis: CVA    Chart review indicates patient may have on going medical management and may have therapy needs to possibly meet inpatient rehab facility criteria with the goal of returning to community.    D/C support will need to be verified: Mother    Physiatry consultation pended per protocol.  W/U & TX pending.      Thank you for the referral.

## 2024-07-05 NOTE — ED NOTES
At approx 0420, pt was triaged. Pt answered questions appropriately and was able to ambulate independently with a steady gait to restroom.   At approx 0430, pt became restless. She was diaphoretic and complained of hot flashes and cramps to her right leg. Pt asked to stand and stretch. Pt assisted onto feet and pt was visibly unsteady. Pt swaying from side to side and unable to put weight onto left leg. Pt became increasingly anxious stating that she just needs to sit down. Pt sat on edge of bed with help of RN. Pt immediately attempted to stand up again but was unable to balance and sat back down onto bed. Pt unable to move and reposition herself back up into bed. Pt assisted back into bed, railings raised for pt safety. Pt asking RN to stand up again, pt educated that due to her unsteady nature and quick change in symptoms, pt is unable to stand up due to those safety concerns. Pt addiment that she feels fine and would like to stand up. Pt reoriented and educated that she cannot stand up at this time.  Affect became more abnormal during interaction. Left sided facial droop was noted during conversation. Pt repeatedly stated that her cramp was causing pain and was asking about the location of her mother, pt speech becoming more delayed. Pt provided with cold washcloths without relief from hot flashes. ERP called to bedside to assess pt.   ERP at bedside at approx 0440, per ERP, left sided facial droop and left sided weakness noted. Code stroke to be called per ERP [place]. Charge RN notified.   Dr. Fernandez at bedside at approx 0450. Initial NIH per neurologist is 4. RN placed pt on zoll monitor and escorted pt to CT with neurologist. CT at approx 0500.   At approx 0510, pt returned to blue 14, pt reattached to cardiac monitor. Some improvement in anxiety noted, pt still reports feeling anxious and uneasy. Pt remains alert and oriented.   At approx 0520, Dr. Fernandez at bedside. Per Dr. Fernandez, pt is candidate for TNK, consent  obtained by Dr. Fernandez from pt, witnessed by RN. Second PIV established. Pharmacy, neurology, and 2 Rns at bedside. TNK administered at 0528. Pt and mother updated on POC, educated on importance of bedrest to ensure pt safety following TNK administration. Pt voiced need to urinate, pt placed on bed pan.     Report given to IR RN. IR RN to transport pt to IR.

## 2024-07-06 ENCOUNTER — APPOINTMENT (OUTPATIENT)
Dept: RADIOLOGY | Facility: MEDICAL CENTER | Age: 34
DRG: 023 | End: 2024-07-06
Attending: INTERNAL MEDICINE
Payer: MEDICAID

## 2024-07-06 PROBLEM — E87.6 HYPOKALEMIA: Status: ACTIVE | Noted: 2024-07-06

## 2024-07-06 PROBLEM — R73.9 HYPERGLYCEMIA: Status: ACTIVE | Noted: 2024-07-06

## 2024-07-06 PROBLEM — D64.9 ANEMIA: Status: ACTIVE | Noted: 2024-07-06

## 2024-07-06 LAB
ANION GAP SERPL CALC-SCNC: 21 MMOL/L (ref 7–16)
B2 GLYCOPROT1 IGG SERPL IA-ACNC: <10 SGU
B2 GLYCOPROT1 IGM SERPL IA-ACNC: <10 SMU
BASOPHILS # BLD AUTO: 0.2 % (ref 0–1.8)
BASOPHILS # BLD: 0.03 K/UL (ref 0–0.12)
BUN SERPL-MCNC: 4 MG/DL (ref 8–22)
CALCIUM SERPL-MCNC: 8.8 MG/DL (ref 8.5–10.5)
CHLORIDE SERPL-SCNC: 107 MMOL/L (ref 96–112)
CHOLEST SERPL-MCNC: 194 MG/DL (ref 100–199)
CO2 SERPL-SCNC: 15 MMOL/L (ref 20–33)
CREAT SERPL-MCNC: 0.6 MG/DL (ref 0.5–1.4)
EOSINOPHIL # BLD AUTO: 0 K/UL (ref 0–0.51)
EOSINOPHIL NFR BLD: 0 % (ref 0–6.9)
ERYTHROCYTE [DISTWIDTH] IN BLOOD BY AUTOMATED COUNT: 48.1 FL (ref 35.9–50)
GFR SERPLBLD CREATININE-BSD FMLA CKD-EPI: 119 ML/MIN/1.73 M 2
GLUCOSE SERPL-MCNC: 142 MG/DL (ref 65–99)
HCT VFR BLD AUTO: 34.5 % (ref 37–47)
HDLC SERPL-MCNC: 70 MG/DL
HGB BLD-MCNC: 11.8 G/DL (ref 12–16)
IMM GRANULOCYTES # BLD AUTO: 0.06 K/UL (ref 0–0.11)
IMM GRANULOCYTES NFR BLD AUTO: 0.4 % (ref 0–0.9)
LDLC SERPL CALC-MCNC: 81 MG/DL
LYMPHOCYTES # BLD AUTO: 1.69 K/UL (ref 1–4.8)
LYMPHOCYTES NFR BLD: 11.5 % (ref 22–41)
MAGNESIUM SERPL-MCNC: 2.1 MG/DL (ref 1.5–2.5)
MCH RBC QN AUTO: 32.4 PG (ref 27–33)
MCHC RBC AUTO-ENTMCNC: 34.2 G/DL (ref 32.2–35.5)
MCV RBC AUTO: 94.8 FL (ref 81.4–97.8)
MONOCYTES # BLD AUTO: 1.13 K/UL (ref 0–0.85)
MONOCYTES NFR BLD AUTO: 7.7 % (ref 0–13.4)
NEUTROPHILS # BLD AUTO: 11.73 K/UL (ref 1.82–7.42)
NEUTROPHILS NFR BLD: 80.2 % (ref 44–72)
NRBC # BLD AUTO: 0 K/UL
NRBC BLD-RTO: 0 /100 WBC (ref 0–0.2)
PLATELET # BLD AUTO: 566 K/UL (ref 164–446)
PMV BLD AUTO: 9.4 FL (ref 9–12.9)
POTASSIUM SERPL-SCNC: 3.7 MMOL/L (ref 3.6–5.5)
RBC # BLD AUTO: 3.64 M/UL (ref 4.2–5.4)
SODIUM SERPL-SCNC: 141 MMOL/L (ref 135–145)
SODIUM SERPL-SCNC: 143 MMOL/L (ref 135–145)
TRIGL SERPL-MCNC: 217 MG/DL (ref 0–149)
WBC # BLD AUTO: 14.6 K/UL (ref 4.8–10.8)

## 2024-07-06 PROCEDURE — 76937 US GUIDE VASCULAR ACCESS: CPT

## 2024-07-06 PROCEDURE — 99291 CRITICAL CARE FIRST HOUR: CPT | Mod: 25 | Performed by: INTERNAL MEDICINE

## 2024-07-06 PROCEDURE — 80048 BASIC METABOLIC PNL TOTAL CA: CPT

## 2024-07-06 PROCEDURE — A9270 NON-COVERED ITEM OR SERVICE: HCPCS | Performed by: INTERNAL MEDICINE

## 2024-07-06 PROCEDURE — 36556 INSERT NON-TUNNEL CV CATH: CPT

## 2024-07-06 PROCEDURE — 71045 X-RAY EXAM CHEST 1 VIEW: CPT

## 2024-07-06 PROCEDURE — 770022 HCHG ROOM/CARE - ICU (200)

## 2024-07-06 PROCEDURE — 700105 HCHG RX REV CODE 258: Performed by: NURSE PRACTITIONER

## 2024-07-06 PROCEDURE — 700101 HCHG RX REV CODE 250: Performed by: INTERNAL MEDICINE

## 2024-07-06 PROCEDURE — 92612 ENDOSCOPY SWALLOW (FEES) VID: CPT

## 2024-07-06 PROCEDURE — C1751 CATH, INF, PER/CENT/MIDLINE: HCPCS

## 2024-07-06 PROCEDURE — 700105 HCHG RX REV CODE 258: Performed by: INTERNAL MEDICINE

## 2024-07-06 PROCEDURE — 700111 HCHG RX REV CODE 636 W/ 250 OVERRIDE (IP): Performed by: INTERNAL MEDICINE

## 2024-07-06 PROCEDURE — 84295 ASSAY OF SERUM SODIUM: CPT

## 2024-07-06 PROCEDURE — 92610 EVALUATE SWALLOWING FUNCTION: CPT

## 2024-07-06 PROCEDURE — 80061 LIPID PANEL: CPT

## 2024-07-06 PROCEDURE — 83735 ASSAY OF MAGNESIUM: CPT

## 2024-07-06 PROCEDURE — 02HV33Z INSERTION OF INFUSION DEVICE INTO SUPERIOR VENA CAVA, PERCUTANEOUS APPROACH: ICD-10-PCS | Performed by: INTERNAL MEDICINE

## 2024-07-06 PROCEDURE — 99255 IP/OBS CONSLTJ NEW/EST HI 80: CPT | Performed by: PHYSICAL MEDICINE & REHABILITATION

## 2024-07-06 PROCEDURE — 70450 CT HEAD/BRAIN W/O DYE: CPT

## 2024-07-06 PROCEDURE — 36556 INSERT NON-TUNNEL CV CATH: CPT | Performed by: INTERNAL MEDICINE

## 2024-07-06 PROCEDURE — 700102 HCHG RX REV CODE 250 W/ 637 OVERRIDE(OP): Performed by: INTERNAL MEDICINE

## 2024-07-06 PROCEDURE — 99233 SBSQ HOSP IP/OBS HIGH 50: CPT | Performed by: STUDENT IN AN ORGANIZED HEALTH CARE EDUCATION/TRAINING PROGRAM

## 2024-07-06 PROCEDURE — 700102 HCHG RX REV CODE 250 W/ 637 OVERRIDE(OP): Performed by: PHYSICAL MEDICINE & REHABILITATION

## 2024-07-06 PROCEDURE — 700111 HCHG RX REV CODE 636 W/ 250 OVERRIDE (IP): Performed by: NURSE PRACTITIONER

## 2024-07-06 PROCEDURE — 85025 COMPLETE CBC W/AUTO DIFF WBC: CPT

## 2024-07-06 PROCEDURE — A9270 NON-COVERED ITEM OR SERVICE: HCPCS | Performed by: PHYSICAL MEDICINE & REHABILITATION

## 2024-07-06 RX ORDER — 3% SODIUM CHLORIDE 3 G/100ML
250 INJECTION, SOLUTION INTRAVENOUS EVERY 6 HOURS PRN
Status: DISCONTINUED | OUTPATIENT
Start: 2024-07-06 | End: 2024-07-07

## 2024-07-06 RX ORDER — POTASSIUM CHLORIDE 7.45 MG/ML
10 INJECTION INTRAVENOUS
Status: COMPLETED | OUTPATIENT
Start: 2024-07-06 | End: 2024-07-06

## 2024-07-06 RX ORDER — POTASSIUM CHLORIDE 1500 MG/1
40 TABLET, EXTENDED RELEASE ORAL ONCE
Status: DISCONTINUED | OUTPATIENT
Start: 2024-07-06 | End: 2024-07-06

## 2024-07-06 RX ORDER — 3% SODIUM CHLORIDE 3 G/100ML
250 INJECTION, SOLUTION INTRAVENOUS CONTINUOUS
Status: DISCONTINUED | OUTPATIENT
Start: 2024-07-06 | End: 2024-07-06

## 2024-07-06 RX ORDER — BACLOFEN 10 MG/1
5 TABLET ORAL 3 TIMES DAILY
Status: DISCONTINUED | OUTPATIENT
Start: 2024-07-06 | End: 2024-07-07

## 2024-07-06 RX ORDER — HYDROMORPHONE HYDROCHLORIDE 1 MG/ML
0.5 INJECTION, SOLUTION INTRAMUSCULAR; INTRAVENOUS; SUBCUTANEOUS
Status: COMPLETED | OUTPATIENT
Start: 2024-07-06 | End: 2024-07-06

## 2024-07-06 RX ADMIN — HYDROMORPHONE HYDROCHLORIDE 0.5 MG: 1 INJECTION, SOLUTION INTRAMUSCULAR; INTRAVENOUS; SUBCUTANEOUS at 04:34

## 2024-07-06 RX ADMIN — SODIUM CHLORIDE, POTASSIUM CHLORIDE, SODIUM LACTATE AND CALCIUM CHLORIDE: 600; 310; 30; 20 INJECTION, SOLUTION INTRAVENOUS at 10:26

## 2024-07-06 RX ADMIN — BACLOFEN 5 MG: 10 TABLET ORAL at 17:40

## 2024-07-06 RX ADMIN — POTASSIUM CHLORIDE 10 MEQ: 7.46 INJECTION, SOLUTION INTRAVENOUS at 12:40

## 2024-07-06 RX ADMIN — ONDANSETRON 4 MG: 2 INJECTION INTRAMUSCULAR; INTRAVENOUS at 00:30

## 2024-07-06 RX ADMIN — SODIUM CHLORIDE, POTASSIUM CHLORIDE, SODIUM LACTATE AND CALCIUM CHLORIDE: 600; 310; 30; 20 INJECTION, SOLUTION INTRAVENOUS at 20:33

## 2024-07-06 RX ADMIN — ENOXAPARIN SODIUM 80 MG: 100 INJECTION SUBCUTANEOUS at 05:35

## 2024-07-06 RX ADMIN — NOREPINEPHRINE BITARTRATE 0.12 MCG/KG/MIN: 1 INJECTION, SOLUTION, CONCENTRATE INTRAVENOUS at 00:10

## 2024-07-06 RX ADMIN — ROSUVASTATIN CALCIUM 5 MG: 5 TABLET, FILM COATED ORAL at 17:40

## 2024-07-06 RX ADMIN — GABAPENTIN 300 MG: 300 CAPSULE ORAL at 05:44

## 2024-07-06 RX ADMIN — SODIUM CHLORIDE 250 ML: 3 INJECTION, SOLUTION INTRAVENOUS at 19:58

## 2024-07-06 RX ADMIN — GABAPENTIN 300 MG: 300 CAPSULE ORAL at 17:40

## 2024-07-06 RX ADMIN — LORAZEPAM 1 MG: 2 INJECTION INTRAMUSCULAR; INTRAVENOUS at 19:47

## 2024-07-06 RX ADMIN — LORAZEPAM 1 MG: 2 INJECTION INTRAMUSCULAR; INTRAVENOUS at 02:33

## 2024-07-06 RX ADMIN — BACLOFEN 5 MG: 10 TABLET ORAL at 13:47

## 2024-07-06 RX ADMIN — SODIUM CHLORIDE 250 ML: 3 INJECTION, SOLUTION INTRAVENOUS at 13:40

## 2024-07-06 RX ADMIN — NOREPINEPHRINE BITARTRATE 0.14 MCG/KG/MIN: 1 INJECTION, SOLUTION, CONCENTRATE INTRAVENOUS at 12:52

## 2024-07-06 RX ADMIN — DOCUSATE SODIUM 100 MG: 100 CAPSULE, LIQUID FILLED ORAL at 17:40

## 2024-07-06 RX ADMIN — FOLIC ACID 2 MG: 1 TABLET ORAL at 05:45

## 2024-07-06 RX ADMIN — POTASSIUM CHLORIDE 10 MEQ: 7.46 INJECTION, SOLUTION INTRAVENOUS at 11:22

## 2024-07-06 RX ADMIN — DOCUSATE SODIUM 100 MG: 100 CAPSULE, LIQUID FILLED ORAL at 05:45

## 2024-07-06 RX ADMIN — ENOXAPARIN SODIUM 80 MG: 100 INJECTION SUBCUTANEOUS at 17:40

## 2024-07-06 RX ADMIN — ASPIRIN 81 MG: 81 TABLET, COATED ORAL at 05:41

## 2024-07-06 ASSESSMENT — ENCOUNTER SYMPTOMS
ABDOMINAL PAIN: 0
DEPRESSION: 0
COUGH: 0
HEADACHES: 1
NAUSEA: 0
SHORTNESS OF BREATH: 0
SPUTUM PRODUCTION: 0
VOMITING: 0
SENSORY CHANGE: 1
BACK PAIN: 0
BLURRED VISION: 0
SORE THROAT: 0
WEAKNESS: 1
FEVER: 0
DIZZINESS: 0
NERVOUS/ANXIOUS: 0
CHILLS: 0
BRUISES/BLEEDS EASILY: 0
MYALGIAS: 0
FOCAL WEAKNESS: 1
PALPITATIONS: 0
SPEECH CHANGE: 0

## 2024-07-06 ASSESSMENT — PAIN DESCRIPTION - PAIN TYPE
TYPE: ACUTE PAIN

## 2024-07-06 NOTE — PROGRESS NOTES
Critical Care Progress Note    Date of admission  7/5/2024    Chief Complaint  33 y.o. female admitted 7/5/2024 with acute CVA    Hospital Course  33 y.o. 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  She presented to [facility] complaining of sudden onset of left-sided weakness and confusion at 2330 last night.  She was seen in the emergency department at [facility] but symptoms resolved at that time.  She had a normal head CT without contrast but a CTA showed a right M3 occlusion.  She was transferred to [facility] without requiring thrombolytics.  While in the ED, she again developed symptoms and a repeat CTA at that time showed an M2 occlusion for which she was administered TNK and went to IR for mechanical thrombectomy.  Unfortunately the thrombectomy was unsuccessful and resulted in an ICA dissection and essentially TICI 0 flow.  She was brought to the intensive care unit and I was consulted for critical care management.    Interval Problem Update  Reviewed last 24 hour events:   - CTA abd/pelvis with hematoma but no active extravasation   - echo with nl EF, poor quality study   - AF, increased WBC to 15   - NSR, -170 on levophed gtt   - CT head pending (unable to get last night due to agitation)   - no movement on LUE, minimal on LLE, sensation loss and facial droop   - Hgb down to 11.8, plts elevated at 566   - low K, elevated AG   - glucose better   - NPO pending SLP, last BM PTA   - good UOP   - therapeutic lovenix    Review of Systems  Review of Systems   Constitutional:  Negative for chills, fever and malaise/fatigue.   HENT:  Negative for congestion and sore throat.    Eyes:  Negative for blurred vision.   Respiratory:  Negative for cough, sputum production and shortness of breath.    Cardiovascular:  Negative for chest pain,  palpitations and leg swelling.   Gastrointestinal:  Negative for abdominal pain, nausea and vomiting.   Genitourinary:  Negative for dysuria.   Musculoskeletal:  Negative for back pain and myalgias.   Skin:  Negative for rash.   Neurological:  Positive for sensory change, focal weakness and headaches. Negative for dizziness and speech change.   Endo/Heme/Allergies:  Does not bruise/bleed easily.   Psychiatric/Behavioral:  Negative for depression. The patient is not nervous/anxious.    All other systems reviewed and are negative.       Vital Signs for last 24 hours   Temp:  [35.8 °C (96.5 °F)-37.3 °C (99.2 °F)] 36.7 °C (98.1 °F)  Pulse:  [] 64  Resp:  [14-63] 20  BP: ()/() 176/76  SpO2:  [91 %-100 %] 94 %    Respiratory Information for the last 24 hours   Room air    Physical Exam   Physical Exam  Vitals and nursing note reviewed.   Constitutional:       General: She is sleeping. She is not in acute distress.     Appearance: She is well-developed and overweight. She is not toxic-appearing.      Comments: Lying flat   HENT:      Head: Normocephalic.      Nose: Nose normal. No congestion.      Mouth/Throat:      Mouth: Mucous membranes are dry.      Pharynx: Oropharynx is clear.   Eyes:      General: No scleral icterus.     Conjunctiva/sclera: Conjunctivae normal.      Pupils: Pupils are equal, round, and reactive to light.      Comments: Eyes closed but able to open to command   Neck:      Vascular: No JVD.      Trachea: No tracheal deviation.   Cardiovascular:      Rate and Rhythm: Normal rate and regular rhythm. Occasional Extrasystoles are present.     Chest Wall: PMI is not displaced.      Pulses: Normal pulses.      Heart sounds: Normal heart sounds. No murmur heard.     Comments: BP being augmented on norepinephrine gtt  Pulmonary:      Effort: Pulmonary effort is normal. No tachypnea or respiratory distress.      Breath sounds: Examination of the left-lower field reveals rhonchi. Rhonchi  present. No wheezing or rales.      Comments: Protecting airway  Abdominal:      General: Bowel sounds are normal. There is no distension.      Palpations: Abdomen is soft.      Tenderness: There is no abdominal tenderness. There is no guarding or rebound.   Musculoskeletal:         General: No tenderness.      Cervical back: Neck supple. No tenderness.      Right lower leg: No edema.      Left lower leg: No edema.      Comments: R arterial puncture site with mild oozing and hematoma   Skin:     General: Skin is warm and dry.      Capillary Refill: Capillary refill takes less than 2 seconds.      Findings: No rash.   Neurological:      Mental Status: She is oriented to person, place, and time and easily aroused.      Cranial Nerves: Cranial nerve deficit present.      Sensory: Sensory deficit present.      Motor: Weakness present.      Comments: No movement in left upper extremity, minimal withdrawal to pain in left lower extremity, improving sensation, ongoing left facial droop and neglect, drowsy but arouses and oriented   Psychiatric:         Behavior: Behavior is cooperative.         Medications  Current Facility-Administered Medications   Medication Dose Route Frequency Provider Last Rate Last Admin    labetalol (Normodyne/Trandate) injection 10 mg  10 mg Intravenous Q10 MIN PRN Jeremy M Gonda, M.D.        hydrALAZINE (Apresoline) injection 10 mg  10 mg Intravenous Q2HRS PRN Jeremy M Gonda, M.D.        niCARdipine (Cardene) 25 mg in  mL Standard Infusion  0-15 mg/hr Intravenous Continuous Jeremy M Gonda, M.D.        acetaminophen (Tylenol) tablet 650 mg  650 mg Oral Q6HRS PRN Jeremy M Gonda, M.D.        ondansetron (Zofran) syringe/vial injection 4 mg  4 mg Intravenous Q4HRS PRN Jeremy M Gonda, M.D.   4 mg at 07/06/24 0030    ondansetron (Zofran ODT) dispertab 4 mg  4 mg Oral Q4HRS PRN Jeremy M Gonda, M.D.        promethazine (Phenergan) tablet 12.5-25 mg  12.5-25 mg Oral Q4HRS PRN Jeremy M Gonda, M.D.         promethazine (Phenergan) suppository 12.5-25 mg  12.5-25 mg Rectal Q4HRS PRN Jeremy M Gonda, M.D.        prochlorperazine (Compazine) injection 5-10 mg  5-10 mg Intravenous Q4HRS PRN Jeremy M Gonda, M.D.   5 mg at 07/05/24 1322    LORazepam (Ativan) injection 0.5-1 mg  0.5-1 mg Intravenous Q4HRS PRN Jeremy M Gonda, M.D.   1 mg at 07/06/24 0233    amitriptyline (Elavil) tablet 100 mg  100 mg Oral QHS PRN Jeremy M Gonda, M.D.        docusate sodium (Colace) capsule 100 mg  100 mg Oral BID Jeremy M Gonda, M.D.   100 mg at 07/06/24 0545    folic acid (Folvite) tablet 2 mg  2 mg Oral DAILY Jeremy M Gonda, M.D.   2 mg at 07/06/24 0545    gabapentin (Neurontin) capsule 300 mg  300 mg Oral BID Jeremy M Gonda, M.D.   300 mg at 07/06/24 0544    norepinephrine (Levophed) 8 mg in 250 mL NS infusion (premix)  0-1 mcg/kg/min Intravenous Continuous Jeremy M Gonda, M.D. 15.6 mL/hr at 07/06/24 0630 0.1 mcg/kg/min at 07/06/24 0630    rosuvastatin (Crestor) tablet 5 mg  5 mg Oral Q EVENING Jeremy M Gonda, M.D.        enoxaparin (Lovenox) inj 80 mg  1 mg/kg Subcutaneous Q12HRS Jeremy M Gonda, M.D.   80 mg at 07/06/24 0535    aspirin EC tablet 81 mg  81 mg Oral DAILY Jeremy M Gonda, M.D.   81 mg at 07/06/24 0541    HYDROcodone-acetaminophen (Norco) 5-325 MG per tablet 1 Tablet  1 Tablet Oral Q8HRS PRN Jeremy M Gonda, M.D.        lactated ringers infusion   Intravenous Continuous Betty Tenorio 100 mL/hr at 07/05/24 2231 New Bag at 07/05/24 2231       Fluids    Intake/Output Summary (Last 24 hours) at 7/6/2024 0712  Last data filed at 7/6/2024 0600  Gross per 24 hour   Intake 950.99 ml   Output 1005 ml   Net -54.01 ml       Laboratory          Recent Labs     07/05/24  0140 07/05/24  0456 07/05/24  0930 07/06/24  0440   SODIUM 142 139  --  143   POTASSIUM 3.9 4.1  --  3.7   CHLORIDE 104 107  --  107   CO2 25 19*  --  15*   BUN 8 8  --  4*   CREATININE 0.9 0.59  --  0.60   MAGNESIUM  --   --  1.8 2.1   CALCIUM 8.9 8.8  --  8.8      Recent Labs     07/05/24  0140 07/05/24  0456 07/06/24  0440   ALTSGPT 25 16  --    ASTSGOT 26 25  --    ALKPHOSPHAT 63 70  --    TBILIRUBIN 0.5 0.3  --    GLUCOSE 121* 112* 142*     Recent Labs     07/05/24  0140 07/05/24  0456 07/05/24  0533 07/06/24  0440   WBC 13.4*  --  12.3* 14.6*   NEUTSPOLYS  --   --  80.50* 80.20*   LYMPHOCYTES  --   --  11.40* 11.50*   MONOCYTES  --   --  4.40 7.70   EOSINOPHILS  --   --  2.90 0.00   BASOPHILS  --   --  0.20 0.20   ASTSGOT 26 25  --   --    ALTSGPT 25 16  --   --    ALKPHOSPHAT 63 70  --   --    TBILIRUBIN 0.5 0.3  --   --      Recent Labs     07/05/24  0140 07/05/24  0533 07/05/24  0930 07/05/24  0950 07/06/24  0440   RBC 4.28 4.02*  --   --  3.64*   HEMOGLOBIN 13.5 12.9  --   --  11.8*   HEMATOCRIT 40.4 37.9  --   --  34.5*   PLATELETCT 585* 526*  --   --  566*   PROTHROMBTM  --   --  13.3 13.9  --    APTT  --   --  21.7* <20.0*  --    INR  --   --  1.00 1.06  --        Imaging  CT:    Reviewed  Echo:   Reviewed  MRI:   Reviewed    Assessment/Plan  * Acute ischemic right MCA stroke (HCC)- (present on admission)  Assessment & Plan  With M2 occlusion s/p TNK 7/5 at 0528, attempted thrombectomy at 0725 with no flow and right distal ICA dissection  Continue systemic anticoagulation with Lovenox, antiplatelet with aspirin  Neurology and neuro IR consulted, consult neurosurgery today to discuss prophylactic hemicraniectomy if worsens  Continue statin  Goal -160 with augmentation titrating norepinephrine drip  Begin hypertonic saline today with goal sodium between 150 and 160 given edema  Hematology consulted given hypercoagulable condition, hypercoagulable workup ordered  PT/OT/SLP eval  Monitor puncture site for hematoma    SLE (systemic lupus erythematosus related syndrome) (HCC)- (present on admission)  Assessment & Plan  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  Continue hydroxychloroquine and Rinvoq  Hematology consulted  Evaluate  for antiphospholipid syndrome    Thrombocytosis- (present on admission)  Assessment & Plan  Chronic, followed by outpatient hematology in [place]    Mixed dyslipidemia- (present on admission)  Assessment & Plan  Continue rosuvastatin    Anemia  Assessment & Plan  Likely due to hematoma around arterial puncture site  Daily CBC with conservative transfusion strategy    Hyperglycemia  Assessment & Plan  Improving  Continue insulin sliding scale    Hypokalemia  Assessment & Plan  Replete and monitor closely    Hypomagnesemia  Assessment & Plan  Repleted    Anxiety- (present on admission)  Assessment & Plan  History of chronic anxiety treated with Ativan  Continue scheduled amitriptyline, as needed IV Ativan for now         VTE:  Lovenox  Ulcer: Not Indicated  Lines: Central Line  Ongoing indication addressed and Nunez Catheter  Ongoing indication addressed    I have performed a physical exam and reviewed and updated ROS and Plan today (7/6/2024). In review of yesterday's note (7/5/2024), there are no changes except as documented above.     Patient remains critically ill today requiring active management of her vasopressor augmentation of blood pressure with close neurologic monitoring given high risk of worsening propagation of stroke versus hemorrhagic transformation. High risk of deterioration and worsening vital organ dysfunction and death without the above critical care interventions.    Discussed patient condition and risk of morbidity and/or mortality with Family, RN, RT, Pharmacy, Charge nurse / hot rounds, Patient, and neurology, neurosurgery, and oncology. Critical care time = 55 minutes in directly providing and coordinating critical care and extensive data review.  No time overlap and excludes procedures.    Please note that this dictation was created using voice recognition software. I have made every reasonable attempt to correct obvious errors, but there may be errors of grammar and possibly content  that I did not discover before finalizing the note.

## 2024-07-06 NOTE — PROGRESS NOTES
Radiology Progress Note   Author: BLANCA Chavarria Date & Time created: 7/6/2024  9:14 AM   Date of admission  7/5/2024  Note to reader: this note follows the APSO format rather than the historical SOAP format. Assessment and plan located at the top of the note for ease of use.    Chief Complaint  33 y.o. female admitted 7/5/2024 with   Chief Complaint   Patient presents with    Sent by MD    Weakness     Transfer from [place] for further evaluation of unilateral weakness.          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  Patient's symptoms resolved but she was transferred to [facility] for higher level of care.  After arriving at [facility], symptoms returned and stroke alert was activated.  Pt underwent a Right cerebral angiogram and Right MCA M2/3 mehanical thrombectomy with unsuccessful clot retrieval with CONRADO Matson on 07/05/2023; overall vascular circulation of TICI 2C. Procedure complicated by nonflow limiting right distal ICA dissection.    Interval History:   07/06/2024 - patient had to have flow stasis device placed overnight to groin access site followed by sandbag to achieve hemostasis.  CTA shows right groin hematoma without extravasation. Dressing currently with old blood; site is soft, slightly tender, with small amount of ecchymosis. Repeat MRI shows R MCA stroke burden with hemorraghic conversion. Neurology concerned it may also represent clot propagation and started pt on lovenox and asa. Patient currently getting swallow eval with SLP.     Assessment/Plan     Principal Problem:    Acute ischemic right MCA stroke (HCC)  Active Problems:    Anxiety    Mixed dyslipidemia    Thrombocytosis    SLE (systemic lupus erythematosus related syndrome) (HCC)     Subjective:    Patient ID:  Annette Garcia is a 79 y.o. female who presents for follow-up of Peripheral Arterial Disease and Hypertension      HPI  80 y/o female with hx of difficult to control HTN (previously on a diuretic but dicsontinued due to recurrent dehydration), renal artery stenosis (LRA severe ostial disease s/p YOLANDA, RRA with mild-mod disease), anemia, acquired hypothyroidism, diffuse large B-cell lymphoma on chemo (last PET showed remission). She presents for f/u HTN and renal artery stenosis.  Last clinic visit BP was uncontrolled. Doxazosin stopped bc she felt it wasn't working. Plan was for clonidine BID. She has been taking it once daily bc she feels it makes her urinate a lot. BP has been steady high with readings 180-200's. She denies CP, SOB, orthopnea, PND, syncope. Has ZARAGOZA which is chronic and some fatigue. Has had prior visits admitting to dietary indiscretion with high sodium diet including Nicole's Pie, Corn bisque, and onion rings (previously was V8, canned foods, TV dinners, soft drinks, gumbo, chinese food). Her main complaint is of back pain.     Review of Systems   Constitution: Positive for weakness and malaise/fatigue.   HENT: Negative for congestion.    Eyes: Negative for blurred vision.   Cardiovascular: Positive for dyspnea on exertion. Negative for chest pain, claudication, cyanosis, irregular heartbeat, leg swelling, near-syncope, orthopnea, palpitations, paroxysmal nocturnal dyspnea and syncope.   Respiratory: Positive for shortness of breath.    Endocrine: Negative for polyuria.   Hematologic/Lymphatic: Negative for bleeding problem.   Skin: Negative for itching and rash.   Musculoskeletal: Positive for back pain and joint pain. Negative for joint swelling, muscle cramps and muscle weakness.   Gastrointestinal: Negative for abdominal pain, hematemesis, hematochezia, melena, nausea and vomiting.   Genitourinary: Negative for dysuria and hematuria.   Neurological: Negative  Hypomagnesemia      Plan IR  - S/P right MCA M2/3 mechanical thrombectomy   - Post access site instructions: no lifting greater than 5 lbs and no baths/swimming/soaking in tub for 5 days. Shower OK. OK to change dressings/band aid as needed.   - Anticoagulation per neurology  - Neuro checks per ICU protocol  - Secondary stroke prevention per Neurology recommendations  - CONRADO signing off   -  -Thank you for allowing Interventional Radiology team to participate in the patients care, if any additional care or requests are needed in the future please do not hesitate to call or place IR order           Review of Systems  Physical Exam   Review of Systems   Constitutional:  Negative for chills and fever.   Respiratory:  Negative for shortness of breath.    Cardiovascular:  Negative for chest pain and palpitations.   Gastrointestinal:  Negative for nausea and vomiting.   Neurological:  Positive for sensory change, weakness (Left Side) and headaches.      Vitals:    07/06/24 0630   BP: (!) 176/76   Pulse:    Resp:    Temp:    SpO2:         Physical Exam  Constitutional:       Appearance: Normal appearance.   Cardiovascular:      Rate and Rhythm: Normal rate and regular rhythm.   Pulmonary:      Effort: Pulmonary effort is normal. No respiratory distress.   Abdominal:      Palpations: Abdomen is soft.   Skin:     General: Skin is warm and dry.      Comments: Right groin access site soft, non-tender, small amount of ecchymosis; dressing half saturated with old blood.      Neurological:      Mental Status: She is alert and oriented to person, place, and time.      Comments: Aox4  PERRLA  Left facial droop  LUE/LLE with no antigravity, LUE/LLE 0/5 strength   Left side sensation diminished.              Labs    Recent Labs     07/05/24  0140 07/05/24  0533 07/06/24  0440   WBC 13.4* 12.3* 14.6*   RBC 4.28 4.02* 3.64*   HEMOGLOBIN 13.5 12.9 11.8*   HEMATOCRIT 40.4 37.9 34.5*   MCV 94.4 94.3 94.8   MCH 31.5* 32.1 32.4   MCHC 33.4  for dizziness, focal weakness, headaches, light-headedness and loss of balance.   Psychiatric/Behavioral: Negative for depression. The patient is not nervous/anxious.         Objective:    Physical Exam   Constitutional: She is oriented to person, place, and time. She appears well-developed and well-nourished.   HENT:   Head: Normocephalic and atraumatic.   Neck: Neck supple. No JVD present.   Cardiovascular: Normal rate, regular rhythm and normal heart sounds.   Pulses:       Carotid pulses are 2+ on the right side, and 2+ on the left side.       Radial pulses are 2+ on the right side, and 2+ on the left side.        Femoral pulses are 2+ on the right side, and 2+ on the left side.       Dorsalis pedis pulses are 2+ on the right side, and 2+ on the left side.        Posterior tibial pulses are 2+ on the right side, and 2+ on the left side.   Pulmonary/Chest: Effort normal and breath sounds normal.   Abdominal: Soft. Bowel sounds are normal.   Musculoskeletal: She exhibits no edema.   Neurological: She is alert and oriented to person, place, and time.   Skin: Skin is warm and dry.   Psychiatric: She has a normal mood and affect. Her behavior is normal. Thought content normal.         Assessment:       1. Renovascular hypertension    2. Essential hypertension    3. Bilateral renal artery stenosis    4. Osteoarthritis of lumbar spine, unspecified spinal osteoarthritis complication status    5. Chronic hyponatremia    6. Thrombocytopenia, unspecified    7. Anemia due to antineoplastic chemotherapy    8. Diffuse large B-cell lymphoma, unspecified body region    9. Acquired hypothyroidism    10. Age related osteoporosis, unspecified pathological fracture presence      78 y/o pt with hx and presentation as above. Doing well from a cardiac perspective and compensated from a HF perspective. Repeat 's. HTN has been very difficult to control, as she has had different reactions to some anti-hypertensives. Will once  34.0 34.2   RDW 13.3 46.9 48.1   PLATELETCT 585* 526* 566*   MPV 9.0 9.0 9.4     Recent Labs     07/05/24  0140 07/05/24  0456 07/06/24  0440   SODIUM 142 139 143   POTASSIUM 3.9 4.1 3.7   CHLORIDE 104 107 107   CO2 25 19* 15*   GLUCOSE 121* 112* 142*   BUN 8 8 4*   CREATININE 0.9 0.59 0.60   CALCIUM 8.9 8.8 8.8     Recent Labs     07/05/24  0140 07/05/24  0456 07/06/24  0440   ALBUMIN 3.7 4.1  --    TBILIRUBIN 0.5 0.3  --    ALKPHOSPHAT 63 70  --    TOTPROTEIN 8.0 7.1  --    ALTSGPT 25 16  --    ASTSGOT 26 25  --    CREATININE 0.9 0.59 0.60     CT-CTA PELVIS WITH & W/O-POST PROCESS   Final Result      1.  No CTA evidence of active extravasation of contrast to suggest active bleeding.   2.  There is a subcutaneous hematoma in the right groin adjacent to the common femoral vessels.   3.  Contrast in the urinary bladder from the angiographic procedure earlier today.      EC-ECHOCARDIOGRAM COMPLETE W/O CONT   Final Result      MR-BRAIN-W/O   Final Result         Acute infarct in the right frontoparietal region and right insula predominantly involving the cortex. Acute infarct also noted in the right caudate and putamen.      Minimal subarachnoid hemorrhage noted in the sylvian fissure and the sulci over the convexity, likely procedure related.      No midline shift or significant mass effect.      IR-THROMBO MECHANICAL ARTERY,INIT   Final Result         33-year-old who presented with left-sided symptoms was found to have a right MCA M3 occlusion with a perfusion defect identified on CT perfusion imaging. Patient underwent emergent mechanical thrombectomy with multiple attempts to recanalize the parietal    M3 branch being unsuccessful. Final angiographic images demonstrate good antegrade flow in the MCA branches other than the occluded right MCA M3 parietal branch.      Short segment dissection of the right ICA just below the skull base was identified on the final angiogram.  Findings discussed with Dr. Fernandez and given  again attempt clonidine BID (no hydralazine or amlodipine due to allergy list).         Plan:       -Clonidine 0.2 mg BID  -BP log  -f/u in 1 month             the nonprogressive nature of the dissection without flow limitation, the dissection will be managed by    initiation of antithrombotic/anticoagulation therapy.      Final recanalization score: TICI 2 C      I, Kavita Matson was physically present and participated during the entire procedure of the IR-THROMBO MECHANICAL ARTERY,INIT.                  DX-CHEST-PORTABLE (1 VIEW)   Final Result      Mild interstitial prominence could be related to hypoinflation. No consolidation or pleural effusion.      CT-CTA NECK WITH & W/O-POST PROCESSING   Final Result      CT angiogram of the neck within normal limits.      CT-CTA HEAD WITH & W/O-POST PROCESS   Final Result      Occlusion of the superior sylvian branch M2 segment RIGHT middle cerebral artery.      These findings were discussed with GAIL AGUIRRE II on 7/5/2024 5:33 AM.            CT-CEREBRAL PERFUSION ANALYSIS   Final Result      1. Cerebral blood flow less than 30% possibly representing completed infarct = 5 mL. Based on distribution of this finding, this is unlikely to represent artifact.      2. T Max more than 6 seconds possibly representing combination of completed infarct and ischemia = 28 mL. Based on the distribution of this finding, this is unlikely to represent artifact.      3. Mismatched volume possibly representing ischemic brain/penumbra= 23 mL      4.  Please note that this cerebral perfusion study and report is Quantitative and targets supratentorial (cerebral) perfusion for evaluation of large vessel territory acute ischemia/infarction. For example, lacunar infarcts, and brainstem/posterior fossa    ischemia/infarction are not evaluated on this study.  Data acquisition is subject to artifacts which can yield non-anatomically plausible perfusion maps which may be due to motion, bolus timing, signal to noise ratio, or other technical factors.    Perfusion map abnormalities which show non-anatomic distributions are likely artifact.   This  "study is not \"stand-alone\" and should only be utilized for diagnosis, management/treatment in correlation with CT, CTA, and/or MRI and clinical factors.         CT-HEAD W/O    (Results Pending)     INR   Date Value Ref Range Status   07/05/2024 1.06 0.87 - 1.13 Final     Comment:     INR - Non-therapeutic Reference Range: 0.87-1.13  INR - Therapeutic Reference Range: 2.0-4.0       No results found for: \"POCINR\"     Intake/Output Summary (Last 24 hours) at 7/6/2024 0914  Last data filed at 7/6/2024 0600  Gross per 24 hour   Intake 950.99 ml   Output 1005 ml   Net -54.01 ml      I have personally reviewed the above labs and imaging      I have performed a physical exam and reviewed and updated ROS and Plan today (7/6/2024).     46 minutes in directly providing and coordinating care and extensive data review.  No time overlap and excludes procedures.   "

## 2024-07-06 NOTE — CARE PLAN
The patient is Watcher - Medium risk of patient condition declining or worsening    Shift Goals  Clinical Goals: Stable or improved neuro exam, post TNK/thrombectomy protocol, -160, monitor for S/S of bleeding  Patient Goals: Water  Family Goals: Updates    Progress made toward(s) clinical / shift goals:        Problem: Neuro Status  Goal: Neuro status will remain stable or improve  Outcome: Progressing     Problem: Skin Integrity  Goal: Skin integrity is maintained or improved  Outcome: Progressing     Problem: Fall Risk  Goal: Patient will remain free from falls  Outcome: Progressing       Patient is not progressing towards the following goals:      Problem: Psychosocial - Patient Condition  Goal: Patient's ability to verbalize feelings about condition will improve  Outcome: Not Progressing     Problem: Hemodynamic Monitoring  Goal: Patient's hemodynamics, fluid balance and neurologic status will be stable or improve  Outcome: Not Progressing

## 2024-07-06 NOTE — PROGRESS NOTES
0315: This RN and Paramedic Mario Alberto were attempting am lab draw on patient.  Patient actively thrashing around in bed, unable to follow commands. This RN noticed more blood on patients femoral groin site. Manual pressure applied while patient actively moving leg away from RN and removing RNs hand from procedure site. This RN called for assistance from other staff, charge RN to bedside. Manually pressure held for 20 minutes, pressure dressing applied and sandbag now in place.    Patient educated on important of keeping leg straight to prevent further bleeding.

## 2024-07-06 NOTE — CONSULTS
Physical Medicine and Rehabilitation Consultation              Date of initial consultation: 7/6/2024  Consulting provider: Jeremy M Gonda, M.D.   Reason for consultation: assess for acute inpatient rehab appropriateness  LOS: 1 Day(s)    Chief complaint: Right MCA syndrome    HPI: The patient is a 33 y.o. 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  Patient was taken to [facility], symptoms resolved, she underwent CT head which was normal.  CT angiogram showed a right M3 occlusion.  No thrombolytic therapy was given due to return to baseline.  Patient was transferred to [facility], redeveloped left facial droop, left arm weakness, left-sided neglect.  NIH score 4.  CT perfusion found 23 mL penumbra.  Patient was treated with IV TNK and went for thrombectomy which was successful in establishing TICI 2C reperfusion.  Follow-up MR brain found evolving right MCA stroke with petechial hemorrhagic conversion    The patient currently reports lethargy.  Patient has dysarthria.  She has a hard time staying awake during examination.  Patient is off to CT scan shortly after my exam concludes.  She reports no prior functional deficit.  Exam is significant for lack of motor control on the left with significant tone in the upper and lower extremity.  Patient has difficulty with fine motor coordination on the right.    ROS  Pertinent positives are mentioned in the HPI, all others reviewed and are negative.    Social Hx:  2 SH  2 SUSY  With: Mother    THERAPY:  Restrictions: Fall risk, swallow ppx  PT: Functional mobility   Pending     OT: ADLs  Pending     SLP:   Pending     IMAGING:  MRI brain 7/5/2024    Acute infarct in the right frontoparietal region and right insula predominantly involving the cortex. Acute infarct also noted in the right caudate and putamen.      Minimal subarachnoid hemorrhage noted in the sylvian fissure and the sulci over the convexity, likely procedure related.     No midline shift or significant mass effect.    PROCEDURES:  Dr. Kavita Matson MD 7/5/2024  Cerebral angiogram and mechanical thrombectomy   Right MCA M2/3 parietal branch occlusion. Final recanalization score TICI 2C.     PMH:  Past Medical History:   Diagnosis Date    Abdominal pain, left upper quadrant     Anxiety     Bronchitis     Chest pain     Cough     Depression     Early satiety     Gastritis     Helicobacter pylori (H. pylori)     Jaw pain     Migraine     Musculoskeletal pain     Back    Otitis media     Right wrist pain     Sinus disorder     Sinusitis     SOB (shortness of breath)     Thrombocytosis     Tonsillitis     URI (upper respiratory infection)     Yeast infection        PSH:  Past Surgical History:   Procedure Laterality Date    ABDOMINAL EXPLORATION         FHX:  Family History   Problem Relation Age of Onset    Diabetes Mother     Hypertension Father     Diabetes Maternal Grandmother     Heart Disease Maternal Grandmother     Diabetes Paternal Grandmother     Diabetes Paternal Grandfather     Cancer Paternal Grandfather        Medications:  Current Facility-Administered Medications   Medication Dose    potassium chloride SA (Kdur) tablet 40 mEq  40 mEq    labetalol (Normodyne/Trandate) injection 10 mg  10 mg    hydrALAZINE (Apresoline) injection 10 mg  10 mg    niCARdipine (Cardene) 25 mg in  mL Standard Infusion  0-15 mg/hr    acetaminophen (Tylenol) tablet 650 mg  650 mg    ondansetron (Zofran) syringe/vial injection 4 mg  4 mg    ondansetron (Zofran ODT) dispertab 4 mg  4 mg    promethazine (Phenergan) tablet 12.5-25 mg  12.5-25 mg    promethazine (Phenergan) suppository 12.5-25 mg  12.5-25 mg    prochlorperazine (Compazine) injection 5-10 mg  5-10 mg    LORazepam (Ativan) injection 0.5-1 mg  0.5-1 mg    amitriptyline (Elavil) tablet 100 mg  100 mg     "docusate sodium (Colace) capsule 100 mg  100 mg    folic acid (Folvite) tablet 2 mg  2 mg    gabapentin (Neurontin) capsule 300 mg  300 mg    norepinephrine (Levophed) 8 mg in 250 mL NS infusion (premix)  0-1 mcg/kg/min    rosuvastatin (Crestor) tablet 5 mg  5 mg    enoxaparin (Lovenox) inj 80 mg  1 mg/kg    aspirin EC tablet 81 mg  81 mg    HYDROcodone-acetaminophen (Norco) 5-325 MG per tablet 1 Tablet  1 Tablet    lactated ringers infusion         Allergies:  Allergies   Allergen Reactions    Pcn [Penicillins] Rash    Augmentin Diarrhea     diarrhea    Zithromax [Azithromycin] Rash     Rash         Physical Exam:  Vitals: BP (!) 176/76   Pulse 64   Temp 36.7 °C (98.1 °F) (Temporal)   Resp 20   Ht 1.473 m (4' 10\")   Wt 82.5 kg (181 lb 14.1 oz)   SpO2 94%   Gen: NAD  Head: NC/AT  Eyes/ Nose/ Mouth: PERRLA, moist mucous membranes  Cardio: RRR, good distal perfusion, warm extremities  Pulm: normal respiratory effort, no cyanosis   Abd: Soft NTND, negative borborygmi   Ext: No peripheral edema. No calf tenderness. No clubbing.    Mental status:  A&Ox4 (person, place, date, situation) answers questions appropriately  Speech: moderate dysarthria    Motor:      Upper Extremity  Myotome R L   Shoulder flexion C5 3/5 0/5   Elbow flexion C5 3/5 0/5   Wrist extension C6 3/5 0/5   Elbow extension C7 3/5 0/5   Finger flexion C8 3/5 0/5   Finger abduction T1 3/5 0/5     Lower Extremity Myotome R L   Hip flexion L2 1/5 0/5   Knee extension L3 1/5 0/5   Ankle dorsiflexion L4 1/5 0/5   Toe extension L5 1/5 0/5   Ankle plantarflexion S1 1/5 0/5     Sensory:   intact to light touch through out    Tone: MAS 2 tone in left upper and lower extremities     Coordination:   Altered fine motor with fingers on right, unable to test left.       Labs: Reviewed and significant for   Recent Labs     07/05/24  0140 07/05/24  0533 07/05/24  0930 07/05/24  0950 07/06/24  0440   RBC 4.28 4.02*  --   --  3.64*   HEMOGLOBIN 13.5 12.9  --   --  " 11.8*   HEMATOCRIT 40.4 37.9  --   --  34.5*   PLATELETCT 585* 526*  --   --  566*   PROTHROMBTM  --   --  13.3 13.9  --    APTT  --   --  21.7* <20.0*  --    INR  --   --  1.00 1.06  --      Recent Labs     24  0140 24  0456 24  0440   SODIUM 142 139 143   POTASSIUM 3.9 4.1 3.7   CHLORIDE 104 107 107   CO2 25 19* 15*   GLUCOSE 121* 112* 142*   BUN 8 8 4*   CREATININE 0.9 0.59 0.60   CALCIUM 8.9 8.8 8.8     Recent Results (from the past 24 hour(s))   EKG    Collection Time: 24  9:08 AM   Result Value Ref Range    Report       [facility]    Test Date:  2024  Pt Name:    DUSTIN LEZAMA             Department: [facility]  MRN:        [id number]                      Room:       ER Room 4  Gender:     Female                       Technician: JENNA  :        [id number]                   Requested By:NANCY GILLIS  Order #:    [id number]                    Reading MD: Austin Barney MD    Measurements  Intervals                                Axis  Rate:       82                           P:          20  CA:         137                          QRS:        17  QRSD:       86                           T:          9  QT:         375  QTc:        438    Interpretive Statements  Sinus rhythm  Nonspecific ST-T wave changes  Compared to ECG 2019 15:34:34  No significant changes  Electronically Signed On 2024 09:14:47 PDT by Austin Barney MD     LUPUS ANTICOAGULANT    Collection Time: 24  9:30 AM   Result Value Ref Range    PT 13.3 12.0 - 14.6 sec    INR 1.00 0.87 - 1.13    APTT 21.7 (L) 24.7 - 36.0 sec    Heparin Xa (LMWH) <0.1 U/mL    Dilute Rian Viper Venom Ever 31.2 28.0 - 48.0 sec    Lupus Anticoagulant Not Present    MAGNESIUM    Collection Time: 24  9:30 AM   Result Value Ref Range    Magnesium 1.8 1.5 - 2.5 mg/dL   APTT    Collection Time: 24  9:50 AM   Result Value Ref Range    APTT <20.0 (L) 24.7 - 36.0 sec   Prothrombin Time    Collection Time: 24  9:50 AM    Result Value Ref Range    PT 13.9 12.0 - 14.6 sec    INR 1.06 0.87 - 1.13   CBC with Differential    Collection Time: 07/06/24  4:40 AM   Result Value Ref Range    WBC 14.6 (H) 4.8 - 10.8 K/uL    RBC 3.64 (L) 4.20 - 5.40 M/uL    Hemoglobin 11.8 (L) 12.0 - 16.0 g/dL    Hematocrit 34.5 (L) 37.0 - 47.0 %    MCV 94.8 81.4 - 97.8 fL    MCH 32.4 27.0 - 33.0 pg    MCHC 34.2 32.2 - 35.5 g/dL    RDW 48.1 35.9 - 50.0 fL    Platelet Count 566 (H) 164 - 446 K/uL    MPV 9.4 9.0 - 12.9 fL    Neutrophils-Polys 80.20 (H) 44.00 - 72.00 %    Lymphocytes 11.50 (L) 22.00 - 41.00 %    Monocytes 7.70 0.00 - 13.40 %    Eosinophils 0.00 0.00 - 6.90 %    Basophils 0.20 0.00 - 1.80 %    Immature Granulocytes 0.40 0.00 - 0.90 %    Nucleated RBC 0.00 0.00 - 0.20 /100 WBC    Neutrophils (Absolute) 11.73 (H) 1.82 - 7.42 K/uL    Lymphs (Absolute) 1.69 1.00 - 4.80 K/uL    Monos (Absolute) 1.13 (H) 0.00 - 0.85 K/uL    Eos (Absolute) 0.00 0.00 - 0.51 K/uL    Baso (Absolute) 0.03 0.00 - 0.12 K/uL    Immature Granulocytes (abs) 0.06 0.00 - 0.11 K/uL    NRBC (Absolute) 0.00 K/uL   Basic Metabolic Panel (BMP)    Collection Time: 07/06/24  4:40 AM   Result Value Ref Range    Sodium 143 135 - 145 mmol/L    Potassium 3.7 3.6 - 5.5 mmol/L    Chloride 107 96 - 112 mmol/L    Co2 15 (L) 20 - 33 mmol/L    Glucose 142 (H) 65 - 99 mg/dL    Bun 4 (L) 8 - 22 mg/dL    Creatinine 0.60 0.50 - 1.40 mg/dL    Calcium 8.8 8.5 - 10.5 mg/dL    Anion Gap 21.0 (H) 7.0 - 16.0   Magnesium    Collection Time: 07/06/24  4:40 AM   Result Value Ref Range    Magnesium 2.1 1.5 - 2.5 mg/dL   Lipid Profile    Collection Time: 07/06/24  4:40 AM   Result Value Ref Range    Cholesterol,Tot 194 100 - 199 mg/dL    Triglycerides 217 (H) 0 - 149 mg/dL    HDL 70 >=40 mg/dL    LDL 81 <100 mg/dL   ESTIMATED GFR    Collection Time: 07/06/24  4:40 AM   Result Value Ref Range    GFR (CKD-EPI) 119 >60 mL/min/1.73 m 2         ASSESSMENT:  Patient is a 33 y.o. female admitted with right M3  occlusion, status post TNK, TICI 2C reperfusion    Rehabilitation: Impaired ADLs and mobility  Barriers to transfer include: Insurance authorization, TCCs to verify disposition, medical clearance and bed availability. All cases are subject to administrative review.     [facility] Code / Diagnosis to Support: 0001.1 - Stroke: Left Body Involvement (Right Brain)    Disposition recommendations:  -Patient has suffered a large right MCA stroke.  She has poor motor strength on the left, increased tone, and significant lethargy that is affecting her ability to function on the right.  She also has incoordination with fine motor movements on the right.  She will need extensive rehab.  Unclear at this time if patient will qualify for inpatient rehab because she will need a lot of of family support.   -PMR to follow in the periphery for rehab appropriateness, please reach out with questions or request for medical management      Medical Complexity:    Large R MCA stroke   - Secondary to Right M3 occlusion, now s/p TNK and thrombectomy with TICI 2C reperfusion  - Anticoagulation with Lovenox 80mg BID   - Daily ASA  - Awaiting PT/OT evals   - Potential candidate for IPR, may need SNF     Spasticity   - Starting baclofen 5mg TID, lower dose due to lethargy. Plan to increase later in the week     Hypertension   - -160 goal per neuro   - Augmented with levophed    Neuropathic pain   - Gabapentin 300mg BID     Lethargy   - Avoid benzodiazepines     DVT PPX: Lovenox       Thank you for allowing us to participate in the care of this patient.     Patient was seen for >80 minutes on unit/floor of which > 50% of time was spent on counseling and coordination of care regarding the above, including prognosis, risk reduction, benefits of treatment, and options for next stage of care.    Fritz Lovell, DO   Physical Medicine and Rehabilitation     Please note that this dictation was created using voice recognition software. I have made every  reasonable attempt to correct obvious errors, but there may be errors of grammar and possibly content that I did not discover before finalizing the note.

## 2024-07-06 NOTE — CONSULTS
[facility] Neurosurgery  Referring MD:  Dr. Gonda  Reason for referral:  right MCA stroke    Author: Charly Razo M.D. Date & Time created: 2024  2:35 PM     Interval History   33 year old female admitted yesterday early am for right MCA thrombotic stroke.  She takes methotrexate, hydroxychloroquine, and Rinvoq for, per chart review, is undefined autoimmune disorder.  TNK was administered IV; TICI 2C was obtained in IR with right distal ICA dissection, non flow limiting.      ROS per h/p    Physical Exam  Awake, alert interactive  Speech fluent appropriate  Pupils midline conjugate  Right  bicep tricep IP DF PF 5/5 left 0/5  Sensation intact touch face, 4 extremities  No Morales's    Patient Active Problem List    Diagnosis Date Noted    Hypokalemia 2024    Hyperglycemia 2024    Anemia 2024    Acute ischemic right MCA stroke (HCC) 2024    Hypomagnesemia 2024    Leukocytosis 10/04/2023    Thrombocytosis 2021    SLE (systemic lupus erythematosus related syndrome) (HCC) 2021    Mixed dyslipidemia 2017    Rosacea 2017    Acne 2015    GERD (gastroesophageal reflux disease) 2014    Migraines 2014    Abdominal pain 2013    Anxiety 2012    Depression 2012       Temp (24hrs), Av.8 °C (98.3 °F), Min:35.8 °C (96.5 °F), Max:37.3 °C (99.2 °F)    Pulse: 70, Respiration: (!) 21, Blood Pressure: (!) 159/72, Pulse Oximetry: 95 %, O2 (LPM): 0       Intake/Output Summary (Last 24 hours) at 2024 1435  Last data filed at 2024 0600  Gross per 24 hour   Intake 950.99 ml   Output 725 ml   Net 225.99 ml       Urethral Catheter (Active)   Site Assessment Clean;Skin intact 24   Collection Container Standard drainage bag 24   Urinary Catheter Care Tamper Evident Seal in Place;Drainage Tube Extended;Drainage Bag Not Overfilled;Drainage Tubing Properly Secured;Drainage Bag Below Bladder Level and Not on Floor  07/06/24 0400   Securement Method Securing device (Describe) 07/06/24 0400   Output (mL) 70 mL 07/06/24 0600        baclofen  5 mg      3% sodium chloride  250 mL Stopped (07/06/24 1355)    3% sodium chloride  250 mL      labetalol  10 mg      hydrALAZINE  10 mg      acetaminophen  650 mg      ondansetron  4 mg      ondansetron  4 mg      promethazine  12.5-25 mg      promethazine  12.5-25 mg      prochlorperazine  5-10 mg      LORazepam  0.5-1 mg      amitriptyline  100 mg      docusate sodium  100 mg      folic acid  2 mg      gabapentin  300 mg      NORepinephrine  0-1 mcg/kg/min 0.14 mcg/kg/min (07/06/24 1252)    rosuvastatin  5 mg      enoxaparin (LOVENOX) injection  1 mg/kg      aspirin  81 mg      HYDROcodone-acetaminophen  1 Tablet      LR   100 mL/hr at 07/06/24 1026       Recent Labs     07/05/24  0140 07/05/24  0533 07/06/24  0440   WBC 13.4* 12.3* 14.6*   RBC 4.28 4.02* 3.64*   HEMOGLOBIN 13.5 12.9 11.8*   HEMATOCRIT 40.4 37.9 34.5*   MCV 94.4 94.3 94.8   MCH 31.5* 32.1 32.4   PLATELETCT 585* 526* 566*     Recent Labs     07/05/24  0140 07/05/24  0456 07/06/24  0440   SODIUM 142 139 143   POTASSIUM 3.9 4.1 3.7   CHLORIDE 104 107 107   CO2 25 19* 15*   GLUCOSE 121* 112* 142*   BUN 8 8 4*   CREATININE 0.9 0.59 0.60   CALCIUM 8.9 8.8 8.8     Recent Labs     07/05/24  0930 07/05/24  0950   APTT 21.7* <20.0*   INR 1.00 1.06     7/6/2024 10:52 AM     HISTORY/REASON FOR EXAM:  Stroke, follow up.        TECHNIQUE/EXAM DESCRIPTION AND NUMBER OF VIEWS:  CT of the head without contrast.     The study was performed on a helical multidetector CT scanner. Contiguous axial sections were obtained from the skull base through the vertex.     Up to date radiation dose reduction adjustments have been utilized to meet ALARA standards for radiation dose reduction.     COMPARISON:  7/5/2024     FINDINGS:  There is no fracture or abnormal extra-axial fluid collection. There is extensive low attenuation involving the right  hemisphere including the basal ganglia. This is consistent with a right MCA territory infarct. There are several small foci of increased   attenuation in this region suggesting petechial hemorrhages. There is edema on the right with approximately 3 mm of right-to-left shift of midline. There is effacement of the right lateral ventricle.     There is right maxillary sinus mucosal thickening.     Mastoids in the field of view are unremarkable.        IMPRESSION:     Diffuse low-attenuation of the right hemisphere is consistent with a MCA territory infarct. Small areas of increased density within this region may represent petechial hemorrhages. There is effacement of the right lateral ventricle with approximately 3   mm of midline shift.    AP:  33 year old female with RMCA stroke, left hemiparesis.  She is following commands, awake.  Continue observation  Hypertonic saline per protocol  ICU observation  Keep well hydrated  Recommend Rheumatology evaluation for autoimmune disorder

## 2024-07-06 NOTE — PROGRESS NOTES
"Late Entry    At 2000 assessment, this RN was assessing patients femoral groin site. Upon even light palpation, patient was noticeably in pain and yelling \"ow\". Fresh blood noted on dressing which was previously clean, dry and intact. New noticeable bruising and firmness to area that was not assessed at 1900 assessment. Charge RN to bedside to assess groin site, and sandbag was placed.  Page placed to IR Dr Matson around 2015, verbal orders to place femstop and obtain CT pelvis.    Femstop placed at 60mmHh per Dr Matson at 2020.  CT pelvis obtained.    Dr Bernabe notified of above events at 2106.      "

## 2024-07-06 NOTE — PROCEDURES
Procedure Note    Date: 7/6/2024  Time: 1300    Procedure: Central Venous Line placement  Site: R IJ vein    Indication: CVA needing BP augmentation with vasopressor and hypertonic saline  Consent: Informed consent obtained from patient or designated decision maker after explaining the benefits/risks of the procedure including but not limited to bleeding, infection, nerve or other deep structure injury, pneumothorax/hemothorax, arrythmia, or death. Patient or surrogate expressed understanding and agreement.    Time-out: Verbal consent was obtained. Immediately prior to procedure, a time out was called to verify the correct patient, procedure, equipment, support staff and site/side marked as required. Pre-procedure pain reported as 0/10 and post-procedure was 2/10.    Procedure: After obtaining consent, a time-out was performed. Appropriate site confirmed with ultrasound and patient positioned, prepped, and draped in sterile fashion. All those present wearing cap and mask and those physically participating remained adhering to sterile fashion with cap, mask, gloves, and gown. 5 mL of local anesthetic injected (1% lidocaine without epinephrine) achieving appropriate comfort level for patient. Vein localized and accessed using continuous ultrasound guidance and a 7 Fr triple lumen catheter placed using Seldinger technique. Able to aspirate dark, non-pulsatile blood through each lumen and sterile saline flushed easily before capping. Line secured and dressed in sterile fashion. Wire removal: After insertion of the catheter via Seldinger technique the guidewire was removed intact and confirmed by the assistant in the room.    Patient tolerated procedure well without any difficulties and remains in care of bedside nurse. CXR will be performed to confirm appropriate placement for internal jugular or subclavian CVLs.    EBL: minimal  Complications: none  CXR: pending    Jeremy Gonda, MD  Critical Care Medicine

## 2024-07-06 NOTE — PROGRESS NOTES
Attempted to take patient to follow up catscan at 0200 this morning. Once in catscan, patient unable to remain still and very anxious despite 1mg of ativan. Catscan staff and this RN felt it was unsafe to complete scan as patient was attempting to pull self up in scanner and reaching for objects outside of the scanner while still on the table.    Unable to complete scan at this time. SHARONA Tenorio notified at 0301.
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
## 2024-07-06 NOTE — PROGRESS NOTES
Neurology Progress Note  Neurohospitalist Service, [facility] Neurosciences    Referring Physician: Jeremy M Gonda, M.D.      Interval History: Patient seen and examined this AM. Denied acute complaints. Patient reportedly more sleepy however on my exam, while eyes closed, patient still able to briskly answer questions and follow commands. Has dense left sided weakness. Repeat CTH w/no midline shift but Rt-hemisphere with large territory infarction and swelling. Rt-lateral ventricle nearly obliterated but without other signs of developing hydrocephalus.     Review of systems: In addition to what is detailed in the HPI and/or updated in the interval history, all other systems reviewed and are negative.    Past Medical History, Past Surgical History and Social History reviewed and unchanged from prior    Medications:    Current Facility-Administered Medications:     potassium chloride (KCL) ivpb 10 mEq, 10 mEq, Intravenous, Q HOUR, Jeremy M Gonda, M.D., Last Rate: 100 mL/hr at 07/06/24 1240, 10 mEq at 07/06/24 1240    baclofen (Lioresal) tablet 5 mg, 5 mg, Oral, TID, Fritz Lovell D.O.    3% sodium chloride (Hypertonic Saline) 500mL infusion 250 mL, 250 mL, Intravenous, Continuous, Deana STARKS Patel, A.P.R.N.    3% sodium chloride (Hypertonic Saline) 500mL infusion 250 mL, 250 mL, Intravenous, Q6HRS PRN, Deana STARKS Patel, A.P.R.N.    labetalol (Normodyne/Trandate) injection 10 mg, 10 mg, Intravenous, Q10 MIN PRN, Jeremy M Gonda, M.D.    hydrALAZINE (Apresoline) injection 10 mg, 10 mg, Intravenous, Q2HRS PRN, Jeremy M Gonda, M.D.    acetaminophen (Tylenol) tablet 650 mg, 650 mg, Oral, Q6HRS PRN, Jeremy M Gonda, M.D.    ondansetron (Zofran) syringe/vial injection 4 mg, 4 mg, Intravenous, Q4HRS PRN, Jeremy M Gonda, M.D., 4 mg at 07/06/24 0030    ondansetron (Zofran ODT) dispertab 4 mg, 4 mg, Oral, Q4HRS PRN, Jeremy M Gonda, M.D.    promethazine (Phenergan) tablet 12.5-25 mg, 12.5-25 mg, Oral, Q4HRS PRN, Cheng LARA  "Gonda, M.D.    promethazine (Phenergan) suppository 12.5-25 mg, 12.5-25 mg, Rectal, Q4HRS PRN, Jeremy M Gonda, M.D.    prochlorperazine (Compazine) injection 5-10 mg, 5-10 mg, Intravenous, Q4HRS PRN, Jeremy M Gonda, M.D., 5 mg at 07/05/24 1322    LORazepam (Ativan) injection 0.5-1 mg, 0.5-1 mg, Intravenous, Q4HRS PRN, Jeremy M Gonda, M.D., 1 mg at 07/06/24 0233    amitriptyline (Elavil) tablet 100 mg, 100 mg, Oral, QHS PRN, Jeremy M Gonda, M.D.    docusate sodium (Colace) capsule 100 mg, 100 mg, Oral, BID, Jeremy M Gonda, M.D., 100 mg at 07/06/24 0545    folic acid (Folvite) tablet 2 mg, 2 mg, Oral, DAILY, Jeremy M Gonda, M.D., 2 mg at 07/06/24 0545    gabapentin (Neurontin) capsule 300 mg, 300 mg, Oral, BID, Jeremy M Gonda, M.D., 300 mg at 07/06/24 0544    norepinephrine (Levophed) 8 mg in 250 mL NS infusion (premix), 0-1 mcg/kg/min, Intravenous, Continuous, Jeremy M Gonda, M.D., Last Rate: 21.8 mL/hr at 07/06/24 1252, 0.14 mcg/kg/min at 07/06/24 1252    rosuvastatin (Crestor) tablet 5 mg, 5 mg, Oral, Q EVENING, Jeremy M Gonda, M.D.    enoxaparin (Lovenox) inj 80 mg, 1 mg/kg, Subcutaneous, Q12HRS, Jeremy M Gonda, M.D., 80 mg at 07/06/24 0535    aspirin EC tablet 81 mg, 81 mg, Oral, DAILY, Jeremy M Gonda, M.D., 81 mg at 07/06/24 0541    HYDROcodone-acetaminophen (Norco) 5-325 MG per tablet 1 Tablet, 1 Tablet, Oral, Q8HRS PRN, Jeremy M Gonda, M.D.    lactated ringers infusion, , Intravenous, Continuous, Betty Tenorio, Last Rate: 100 mL/hr at 07/06/24 1026, New Bag at 07/06/24 1026    Physical Examination:   BP (!) 159/72   Pulse 70   Temp 36.6 °C (97.9 °F) (Temporal)   Resp (!) 21   Ht 1.473 m (4' 10\")   Wt 82.5 kg (181 lb 14.1 oz)   LMP 06/12/2024 (Exact Date)   SpO2 95%   BMI 38.01 kg/m²     NEUROLOGICAL EXAM:     MENTAL STATUS: Eyes closed but responds briskly to questions, alert and oriented to person, place, time and situation  LANG/SPEECH: Naming and repetition intact, fluent speech, follows simple " commands.    CRANIAL NERVES:  II: Pupils equal and reactive, no VF deficits  III, IV, VI: EOM intact, no gaze preference or deviation, no nystagmus.  V: normal sensation in V1, V2, and V3 segments bilaterally  VII: Mild left facial droop, slight asymmetry with smile  VIII: normal hearing to speech  IX, X: normal palatal elevation, no uvular deviation  XI: 5/5 head turn and 5/5 shoulder shrug bilaterally  XII: midline tongue protrusion    MOTOR: Full strength in RUE/RLE, no movement in LUE/LLE, stiff tone    REFLEXES:  bilateral flexor plantar response, no clonus  SENSORY: Normal to fine touch in all extremities, +DSS loss  COORD: Normal finger to nose, no tremor, no dysmetria  GAIT: Deferrred    Objective Data:    Labs:  Lab Results   Component Value Date/Time    PROTHROMBTM 13.9 07/05/2024 09:50 AM    INR 1.06 07/05/2024 09:50 AM      Lab Results   Component Value Date/Time    WBC 14.6 (H) 07/06/2024 04:40 AM    RBC 3.64 (L) 07/06/2024 04:40 AM    HEMOGLOBIN 11.8 (L) 07/06/2024 04:40 AM    HEMATOCRIT 34.5 (L) 07/06/2024 04:40 AM    MCV 94.8 07/06/2024 04:40 AM    MCH 32.4 07/06/2024 04:40 AM    MCHC 34.2 07/06/2024 04:40 AM    MPV 9.4 07/06/2024 04:40 AM    NEUTSPOLYS 80.20 (H) 07/06/2024 04:40 AM    LYMPHOCYTES 11.50 (L) 07/06/2024 04:40 AM    MONOCYTES 7.70 07/06/2024 04:40 AM    EOSINOPHILS 0.00 07/06/2024 04:40 AM    BASOPHILS 0.20 07/06/2024 04:40 AM      Lab Results   Component Value Date/Time    SODIUM 143 07/06/2024 04:40 AM    POTASSIUM 3.7 07/06/2024 04:40 AM    CHLORIDE 107 07/06/2024 04:40 AM    CO2 15 (L) 07/06/2024 04:40 AM    GLUCOSE 142 (H) 07/06/2024 04:40 AM    BUN 4 (L) 07/06/2024 04:40 AM    CREATININE 0.60 07/06/2024 04:40 AM    BUNCREATRAT 18 05/26/2017 07:09 AM    GLOMRATE 95 10/31/2023 10:46 AM      Lab Results   Component Value Date/Time    CHOLSTRLTOT 194 07/06/2024 04:40 AM    LDL 81 07/06/2024 04:40 AM    HDL 70 07/06/2024 04:40 AM    TRIGLYCERIDE 217 (H) 07/06/2024 04:40 AM       Lab  Results   Component Value Date/Time    ALKPHOSPHAT 70 07/05/2024 04:56 AM    ASTSGOT 25 07/05/2024 04:56 AM    ALTSGPT 16 07/05/2024 04:56 AM    TBILIRUBIN 0.3 07/05/2024 04:56 AM        Imaging/Testing:    I interpreted and/or reviewed the patient's neuroimaging    CT-HEAD W/O   Final Result      Diffuse low-attenuation of the right hemisphere is consistent with a MCA territory infarct. Small areas of increased density within this region may represent petechial hemorrhages. There is effacement of the right lateral ventricle with approximately 3    mm of midline shift.               CT-CTA PELVIS WITH & W/O-POST PROCESS   Final Result      1.  No CTA evidence of active extravasation of contrast to suggest active bleeding.   2.  There is a subcutaneous hematoma in the right groin adjacent to the common femoral vessels.   3.  Contrast in the urinary bladder from the angiographic procedure earlier today.      EC-ECHOCARDIOGRAM COMPLETE W/O CONT   Final Result      MR-BRAIN-W/O   Final Result         Acute infarct in the right frontoparietal region and right insula predominantly involving the cortex. Acute infarct also noted in the right caudate and putamen.      Minimal subarachnoid hemorrhage noted in the sylvian fissure and the sulci over the convexity, likely procedure related.      No midline shift or significant mass effect.      IR-THROMBO MECHANICAL ARTERY,INIT   Final Result         33-year-old who presented with left-sided symptoms was found to have a right MCA M3 occlusion with a perfusion defect identified on CT perfusion imaging. Patient underwent emergent mechanical thrombectomy with multiple attempts to recanalize the parietal    M3 branch being unsuccessful. Final angiographic images demonstrate good antegrade flow in the MCA branches other than the occluded right MCA M3 parietal branch.      Short segment dissection of the right ICA just below the skull base was identified on the final angiogram.   Findings discussed with Dr. Fernandez and given the nonprogressive nature of the dissection without flow limitation, the dissection will be managed by    initiation of antithrombotic/anticoagulation therapy.      Final recanalization score: TICI 2 C      I, Kavita Matson was physically present and participated during the entire procedure of the IR-THROMBO MECHANICAL ARTERY,INIT.                  DX-CHEST-PORTABLE (1 VIEW)   Final Result      Mild interstitial prominence could be related to hypoinflation. No consolidation or pleural effusion.      CT-CTA NECK WITH & W/O-POST PROCESSING   Final Result      CT angiogram of the neck within normal limits.      CT-CTA HEAD WITH & W/O-POST PROCESS   Final Result      Occlusion of the superior sylvian branch M2 segment RIGHT middle cerebral artery.      These findings were discussed with GAIL AGUIRRE II on 7/5/2024 5:33 AM.            CT-CEREBRAL PERFUSION ANALYSIS   Final Result      1. Cerebral blood flow less than 30% possibly representing completed infarct = 5 mL. Based on distribution of this finding, this is unlikely to represent artifact.      2. T Max more than 6 seconds possibly representing combination of completed infarct and ischemia = 28 mL. Based on the distribution of this finding, this is unlikely to represent artifact.      3. Mismatched volume possibly representing ischemic brain/penumbra= 23 mL      4.  Please note that this cerebral perfusion study and report is Quantitative and targets supratentorial (cerebral) perfusion for evaluation of large vessel territory acute ischemia/infarction. For example, lacunar infarcts, and brainstem/posterior fossa    ischemia/infarction are not evaluated on this study.  Data acquisition is subject to artifacts which can yield non-anatomically plausible perfusion maps which may be due to motion, bolus timing, signal to noise ratio, or other technical factors.    Perfusion map abnormalities which show non-anatomic  "distributions are likely artifact.   This study is not \"stand-alone\" and should only be utilized for diagnosis, management/treatment in correlation with CT, CTA, and/or MRI and clinical factors.         IR-PICC LINE PLACEMENT W/ GUIDANCE > AGE 5    (Results Pending)   DX-CHEST-LIMITED (1 VIEW)    (Results Pending)       Assessment and Plan:  33 year-old woman with autoimmune disorder, presenting with recurrent R MCA syndrome, now with evidence of a R M2 thrombus on stroke protocol CT.   Discussed risks, benefits, and alternative of IV-TNK with mom (Brandi) and patient, and consensus is to proceed with thrombolytic therapy.  Discussed with Neuro-IR and will proceed to OR for endovascular clot retrieval.  Admit to RICU post-operatively for post-TNK/thrombectomy care.  Unclear etiology of stroke- but given history, most likely related to hypercoagulability from her autoimmune disorder.  Will recommend anticoagulation.  Needs evaluation for antiphospholipid syndrome- as this warrants coumadin therapy as oppose to DOAC. Follow up MRI w/ persistent M2/M3 clot, non-flow limiting Rt-ICA dissection. Therapeutic lovenox initiated and on aspirin for dissection. Patient on hemicrani watch although will have significant risk in setting of need for dual antithrombotic therapy. Started HTS to help mitigate further hemispheric enlargement however, have discussed with family guarded prognosis.     Repeat CTH w/no midline shift but Rt-hemisphere with large territory infarction and swelling. Rt-lateral ventricle nearly obliterated but without other signs of developing hydrocephalus.      Problem list:  -- Right MCA stroke  -- Undefined autoimmunity     Recommendations:  -- Status post IV-TNK, given at 0528 7/5/2024  -- Admitted to RICU on every 2 hours neurochecks  -- SBP goal 110-140; okay to utilize Cardene grace as needed  -- MRI brain: Acute infarct in the right frontoparietal region and right insula, right caudate and putamen.  " Minimal subarachnoid hemorrhage in sylvian fissure and sulci over convexity favored procedural related  -- Started HTS for new or malignant right MCA infarction; goal sodium 150-160  -- Neurosurgery consult for evaluation hemicrania watch  --  repeat head CT if clinical deterioration  -- On therapeutic Lovenox in consultation with heme-onc for hypercoagulable state  --On aspirin secondary to right ICA dissection  -- A1c: 4.9, LDL: 81  -- ok to continue home crestor 5mg, will aim for LDL goal < 70  -- attain hypercoagulable labs:     --- antiphospholipid panel (anti-cardiolipin, anti-[id number], lupus anticoagulant)     --- Factor II mutation, Factor V Leiden, Antithrombin III assay  -- complete embolic evaluation with TTE with bubble study, ziopatch monitoring at discharge  -- PT/OT/SLP ok even within first 24 hours    I have performed a physical exam and reviewed and updated ROS and Plan today (7/6/2024).     Rian Lovelace III, MD  ABPN Board Certified in Neurology  Vascular Neurology, [facility] Acute [facility] Services

## 2024-07-06 NOTE — THERAPY
Speech Language Pathology   Flexible Endoscopic Evaluation of Swallowing (FEES)        Patient Name: Ellie Sánchez  AGE:  33 y.o., SEX:  female  Medical Record #: [id number]  Date of Service: 7/6/2024      History of Present Illness  33-year-old female with autoimmune disorder with recurrent R MCA CVA with R M2 thrombus on CTA.  IV TNK administered at 528, cerebral angiogram and mechanical thrombectomy -  final recanalization score TICI 2C.     PMHx anxiety, bronchitis, otitis media  No previous h/o SLP services at [facility]     CMHx Acute ischemic right MCA stroke     CXR 7/5  Mild interstitial prominence could be related to hypoinflation. No consolidation or pleural effusion.     CTA-Head 7/5  Occlusion of the superior sylvian branch M2 segment RIGHT middle cerebral artery.    Pertinent Information  Current Method of Nutrition: NPO until cleared by speech pathology  Patient Behaviors: Drowsy   Dentition: Good   Factor(s) Affecting Performance: Impaired endurance     Discussed the risks, benefits, and alternatives of the FEES procedure. Patient/family acknowledged and agreed to proceed.    Assessment  Flexible Endoscopic Evaluation of Swallowing (FEES) completed at bedside today. The endoscope was passed transnasally via Right nare to evaluate the anatomy and physiology of swallowing. Pt tolerated the procedure with no apparent distress.    Anatomic Findings: WFL  Vocal Fold Motion: Bilateral movement  Secretion Management: Adequate    Consistency PAS Score Timing Residue Comments   Thin Liquid 2 During swallow Vallecular Residue: Mild (5%-25%)  Pyriform Sinus Residue: Trace (1%-5%) Tsp, cup, straw - PAS 2 during  Cup, straw x2 - PAS 1    Mildly Thick 1 N/A Vallecular Residue: Trace (1%-5%)  Pyriform Sinus Residue: Trace (1%-5%) Tsp, cup x2   Liquidised 1 N/A Vallecular Residue: None (0%)  Pyriform Sinus Residue: None (0%) (trace PPW)    Pudding 1 N/A Vallecular Residue: None (0%)  Pyriform Sinus Residue: None (0%)  (trace PPW)    Soft & Bite Sized 1 N/A Vallecular Residue: None (0%) (large unchewed chunk resting on epiglottic rim/PPW above the airway)  Pyriform Sinus Residue: None (0%)    Regular Solid 1 N/A Vallecular Residue: Trace (1%-5%)  Pyriform Sinus Residue: Trace (1%-5%)      Penetration-Aspiration Scale (PAS)  1     No contrast enters airway  2     Contrast enters the airway, remains above the vocal folds, and is ejected from the airway (not seen in the airway at the end of the swallow).  3     Contrast enters the airway, remains above the vocal folds, and is not ejected from the airway (is seen in the airway after the swallow).  4     Contrast enters the airway, contacts the vocal folds, and is ejected from the airway.  5     Contrast enters the airway, contacts the vocal folds, and is not ejected from the airway  6     Contrast enters the airway, crosses the plane of the vocal folds, and is ejected from the airway.  7     Contrast enters the airway, crosses the plane of the vocal folds, and is not ejected from the airway despite effort.  8     Contrast enters the airway, crosses the plane of the vocal folds, is not ejected from the airway and there is no response to aspiration.    Oral phase:  Reduced mandibular excursion for opening mouth to allow bolus acceptance from utensils, along with reduced labial seal on L resulting in anterior bolus loss of liquids, liquidized and purees. Prolonged and incomplete mastication of both soft and regular solids with large unchewed chunk of peach seen entering the pharynx. Pt retained regular solids in R side of oral cavity after chewing, needing frequent cues to swallow and ultimately needing two liquid rinses to clear. Reduced orolingual bolus control resulted in quick spillage of thins to the pyriforms, where it settled before swallowing, as well as posterior bolus escape of a peach chunk.     Pharyngeal phase:  Delayed pharyngeal swallow onset with trigger after liquids had  settled in the pyriforms.   Laryngeal mistiming resulted in penetration of thin liquids during the swallow with spontaneous ejection.  Adequate pharyngeal constriction and shortening with only a trace amount of PPW/pyriform sinus residue with liquids and regular solids.  Reduced tongue base retraction resulting in trace-mild vallecular residue with thin liquids and trace vallecular residue with mildly thick liquids and regular solids.   Reduced pharyngeal sensation/awareness of solid residue with a large unchewed peach chunk resting on the epiglottic rim against the PPW with pt needing verbal cues to swallow after it spilled into the pharynx.      Compensatory Strategies:  Cleansing swallow (spontaneous in most instances): EFFECTIVE to clear pharyngeal residue  Liquid rinse: EFFECTIVE to clear oral residue w/ solids    Severity Rating:  Severity Rating: HORTENCIA  HORTENCIA: Mild-Moderate (Moderate oral, mild pharyngeal)    Clinical Impressions  The pt presents with a moderate oral and a mild pharyngeal dysphagia, likely acute related to R CVA, characterized by impaired mastication, reduced bolus formation, decreased labial seal on L, reduced orolingual bolus control with laryngeal mistiming contributing to oral residue w/ solids and penetration of thin liquids during the swallow. Swallow safety and pharyngeal efficiency are intact, though oral efficiency is impaired. Patient is at low risk for aspiration and/or malnutrition, though is at elevated risk for choking due to impaired mastication, reduced SUNITA and poor awareness of solid residue in her throat. Short term diet modification is indicated. Expect diet advancement as SUNITA, attention and oral motor deficits improve. Patient appears to be a good candidate for behavioral and exercise based swallow rehabilitation.     Recommendations  Diet Consistency: Minced/moist solids, Thin liquids  Medication: Whole with puree, As tolerated  Supervision: Direct supervision during meals,  Assist with meal tray set up, Monitor due to impulsive behavior  Positioning: Fully upright and midline during oral intake  Strategies: Small bites/sips, Alternate bites and sips, Slow rate of intake, Multiple swallows (2) per bite/sips, Reduce environmental distractions  Oral Care:  (TID after meals)  Additional Instrumentation: None    SLP Treatment Plan  Treatment Plan: Dysphagia Treatment  SLP Frequency: 4x Per Week  Estimated Duration: Until Therapy Goals Met    Anticipated Discharge Needs  Discharge Recommendations: Recommend post-acute placement for additional speech therapy services prior to discharge home   Therapy Recommendations Upon DC: Dysphagia Training, Patient / Family / Caregiver Education       Patient / Family Goals  Patient / Family Goal #1: water, sprite, to go home  Goal #1 Outcome: Progressing as expected  Short Term Goal # 1: Patient will participate in an instrumental swallow study to determine swallow physiology, aspiration risk and inform POC.  Goal Outcome # 1: Goal met, new goal added  Short Term Goal # 1 B : Patient will consume minced/moist solids, thin liquids with no s/sx of airway invasion or respiratory decline given min cues for safe swallow precautions.  Short Term Goal # 2: Patient will complete OMEX x20 reps per session given min cues.      Susan Remy MS,CCC-SLP

## 2024-07-06 NOTE — DISCHARGE PLANNING
Case Management Discharge Planning    Admission Date: 7/5/2024  GMLOS: 7.5  ALOS: 1    6-Clicks ADL Score:    6-Clicks Mobility Score:        Anticipated Discharge Dispo: Discharge Disposition: Discharged to home/self care (01)    DME Needed: No    Action(s) Taken: DC Assessment Complete (See below). Pt admitted for weakness, sent from [place]. CTA head revealed right MCA occlusion near junction of M2 and M3 segments in posterior right frontal region. Pt felt weak after using restroom, left sided weakness for 30 minutes.     CM to follow for DC needs.     IPR following. Pending PT and OT recommendations.     Escalations Completed: None    Medically Clear: No    Barriers to Discharge: Medical clearance    Is the patient up for discharge tomorrow: No    Care Transition Team Assessment    Information Source  Orientation Level: Oriented X4  Information Given By: Patient  Who is responsible for making decisions for patient? : Patient    Readmission Evaluation  Is this a readmission?: No    Elopement Risk  Legal Hold: No  Ambulatory or Self Mobile in Wheelchair: No-Not an Elopement Risk  Elopement Risk: Not at Risk for Elopement    Interdisciplinary Discharge Planning  Lives with - Patient's Self Care Capacity: Parents  Prior Services: Home-Independent    Discharge Preparedness  What is your plan after discharge?: Home with help  What are your discharge supports?: Parent  Prior Functional Level: Independent with Activities of Daily Living    Functional Assesment  Prior Functional Level: Independent with Activities of Daily Living    Finances  Financial Barriers to Discharge: No  Prescription Coverage: Yes    Vision / Hearing Impairment  Right Eye Vision: Wears Glasses, Other (Comments) (wears glasses at night, with driving)  Left Eye Vision: Wears Glasses, Other (Comments) (wears glasses at night, with driving)    Advance Directive  Advance Directive?: None    Domestic Abuse  Have you ever been the victim of abuse  or violence?: No    Psychological Assessment  History of Substance Abuse: None  History of Psychiatric Problems: No  Non-compliant with Treatment: No  Newly Diagnosed Illness: No    Discharge Risks or Barriers  Patient risk factors: Readmission    Anticipated Discharge Information  Discharge Disposition: Discharged to home/self care (01)

## 2024-07-06 NOTE — HOSPITAL COURSE
Ms. Sánchez is a 33 y.o. 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  [place] was seen in [place] [facility] but symptoms resolved at that time.  She had a normal head CT without contrast, but a CTA head showed a right M3 occlusion.  She was transferred to [facility] on 7/5/2024 without requiring thrombolytics.  While in the ED, she again developed symptoms and a repeat CTA at that time showed an right M2 occlusion for which she was administered TNK and went to IR for mechanical thrombectomy.  Unfortunately the thrombectomy was unsuccessful and resulted in an ICA dissection with essentially TICI 0 flow.  She was admitted to the intensive care unit for ongoing critical care management.  7/6 - levophed infusion for SBP goals > 130 and < 170, neurosurgery consulted to monitor for need for prophylactic hemocrani, 3% started  7/7 - levo drip, ongoing left sided weakness, 23% bolus needed for reach Na goal  7/8 - SBP goals > 120-->levo ongoing, 3% ongoing, precedex on/off overnight for anxiety  7/9 - levo off, 3% ongoing, improved anxiety today  7/10 - worsened agitation/anxiety overnight, back on precedex, weaned off 3%

## 2024-07-06 NOTE — THERAPY
Speech Language Pathology   Clinical Swallow Evaluation     Patient Name: Ellie Sánchez  AGE:  33 y.o., SEX:  female  Medical Record #: [id number]  Date of Service: 2024      History of Present Illness  33-year-old female with autoimmune disorder with recurrent R MCA CVA with R M2 thrombus on CTA.  IV TNK administered at 528, cerebral angiogram and mechanical thrombectomy -  final recanalization score TICI 2C.    PMHx anxiety, bronchitis, otitis media  No previous h/o SLP services at [facility]    CMHx Acute ischemic right MCA stroke    CXR   Mild interstitial prominence could be related to hypoinflation. No consolidation or pleural effusion.    CTA-Head   Occlusion of the superior sylvian branch M2 segment RIGHT middle cerebral artery.    General Information:  Vitals  O2 Delivery Device: None - Room Air  Level of Consciousness: Drowsy  Patient Behaviors: Anxious (Eager to eat/drink)  Orientation: Self, , General place, Situation  Follows Directives: Yes - simple commands only    Prior Living Situation & Level of Function:  Prior Services: Home-Independent  Lives with - Patient's Self Care Capacity: Parents  Comments: mom reports she does not work but does drive  Communication: WFL  Swallowing: WFL    Oral Mechanism Evaluation:  Dentition: Good   Facial Symmetry: Central left facial droop  Facial Sensation: Equal     Labial Observations: Left sided weakness   Lingual Observations: Left lingual deviation  Motor Speech: slightly imprecise articulation but >90% intelligible at the sentence level    Laryngeal Function:  Secretion Management: Adequate  Voice Quality: WFL  Cough: Perceptually WNL    Subjective  Patient was sleeping on arrival with her cousin at bedside. She wakened easily and requested water, Sprite. Patient demonstrated R gaze preference, did not look to persons on her L. Her mom arrived during the evaluation.    Assessment  Current Method of Nutrition: NPO until cleared by speech  pathology  Positioning: Semi-Caldwell's (30-45 degrees) (due to risk of bleeding from groin)  Bolus Administration: SLP, Patient    O2 Delivery Device: None - Room Air  Factor(s) Affecting Performance: Impaired endurance    Swallowing Trials:  Swallowing Trials  Ice: WFL  Thin Liquid (TN0): Impaired  Pureed (PU4): WFL    Comments: Patient was given ice chips, thin water via tsp/cup/straw and pudding. Patient was able to grasp a cup and self-administer with her R hand. She was impulsive with PO intake, taking large consecutive sips from the cup and the straw, needing verbal/tactile cues to slow rate. Decreased labial seal on L resulted in mild anterior bolus loss with thin liquids via all modes of delivery, more so with thins via straw. Decreased bolus formation with mild L sided lingual/buccal residue noted with purees, which cleared with a thin liquid rinse. Pharyngeal swallow response appeared prompt with 2-3 swallows per bolus noted, which can indicate pharyngeal inefficiency. Immediate cough response occurred with thins via straw, concerning for some level of airway invasion.     Clinical Impressions  Patient presents with an oral and a suspected pharyngeal dysphagia, likely acute related to R CVA and reduced SUNITA, as evidenced by impaired bolus formation, decreased labial seal and coughing w/ thin liquids. An instrumental swallow study is indicated to determine swallow physiology and inform POC. Please refer to FEES note to follow.     Recommendations  Diet Consistency: NPO except ice chips pending FEES today  Instrumentation: FEES  Medication: Non Oral  Supervision: 1:1 feeding with constant supervision  Oral Care: Q4h     SLP Treatment Plan  Treatment Plan: Dysphagia Treatment, Patient/Family/Caregiver Training  SLP Frequency: 4x Per Week  Estimated Duration: Until Therapy Goals Met      Anticipated Discharge Needs  Discharge Recommendations: Recommend post-acute placement for additional speech therapy services  prior to discharge home   Therapy Recommendations Upon DC: Dysphagia Training, Patient / Family / Caregiver Education        Patient / Family Goals  Patient / Family Goal #1: water, sprite, to go home  Short Term Goals  Short Term Goal # 1: Patient will participate in an instrumental swallow study to determine swallow physiology, aspiration risk and inform POC.      Susan Remy MS,CCC-SLP

## 2024-07-07 ENCOUNTER — APPOINTMENT (OUTPATIENT)
Dept: RADIOLOGY | Facility: MEDICAL CENTER | Age: 34
DRG: 023 | End: 2024-07-07
Attending: INTERNAL MEDICINE
Payer: MEDICAID

## 2024-07-07 LAB
ANION GAP SERPL CALC-SCNC: 13 MMOL/L (ref 7–16)
BASOPHILS # BLD AUTO: 0.3 % (ref 0–1.8)
BASOPHILS # BLD: 0.04 K/UL (ref 0–0.12)
BUN SERPL-MCNC: 3 MG/DL (ref 8–22)
CALCIUM SERPL-MCNC: 8.4 MG/DL (ref 8.5–10.5)
CARDIOLIPIN IGA SER IA-ACNC: <10 APL
CARDIOLIPIN IGG SER IA-ACNC: <10 GPL
CARDIOLIPIN IGM SER IA-ACNC: <10 MPL
CHLORIDE SERPL-SCNC: 108 MMOL/L (ref 96–112)
CO2 SERPL-SCNC: 23 MMOL/L (ref 20–33)
CREAT SERPL-MCNC: 0.56 MG/DL (ref 0.5–1.4)
EOSINOPHIL # BLD AUTO: 0.02 K/UL (ref 0–0.51)
EOSINOPHIL NFR BLD: 0.1 % (ref 0–6.9)
ERYTHROCYTE [DISTWIDTH] IN BLOOD BY AUTOMATED COUNT: 48.7 FL (ref 35.9–50)
GFR SERPLBLD CREATININE-BSD FMLA CKD-EPI: 121 ML/MIN/1.73 M 2
GLUCOSE SERPL-MCNC: 134 MG/DL (ref 65–99)
HCT VFR BLD AUTO: 31.4 % (ref 37–47)
HGB BLD-MCNC: 10.6 G/DL (ref 12–16)
IMM GRANULOCYTES # BLD AUTO: 0.05 K/UL (ref 0–0.11)
IMM GRANULOCYTES NFR BLD AUTO: 0.3 % (ref 0–0.9)
LYMPHOCYTES # BLD AUTO: 2.9 K/UL (ref 1–4.8)
LYMPHOCYTES NFR BLD: 19 % (ref 22–41)
MAGNESIUM SERPL-MCNC: 1.8 MG/DL (ref 1.5–2.5)
MCH RBC QN AUTO: 32.8 PG (ref 27–33)
MCHC RBC AUTO-ENTMCNC: 33.8 G/DL (ref 32.2–35.5)
MCV RBC AUTO: 97.2 FL (ref 81.4–97.8)
MONOCYTES # BLD AUTO: 1.64 K/UL (ref 0–0.85)
MONOCYTES NFR BLD AUTO: 10.7 % (ref 0–13.4)
NEUTROPHILS # BLD AUTO: 10.62 K/UL (ref 1.82–7.42)
NEUTROPHILS NFR BLD: 69.6 % (ref 44–72)
NRBC # BLD AUTO: 0 K/UL
NRBC BLD-RTO: 0 /100 WBC (ref 0–0.2)
PLATELET # BLD AUTO: 502 K/UL (ref 164–446)
PMV BLD AUTO: 9.3 FL (ref 9–12.9)
POTASSIUM SERPL-SCNC: 3.9 MMOL/L (ref 3.6–5.5)
RBC # BLD AUTO: 3.23 M/UL (ref 4.2–5.4)
SODIUM SERPL-SCNC: 141 MMOL/L (ref 135–145)
SODIUM SERPL-SCNC: 141 MMOL/L (ref 135–145)
SODIUM SERPL-SCNC: 142 MMOL/L (ref 135–145)
SODIUM SERPL-SCNC: 144 MMOL/L (ref 135–145)
WBC # BLD AUTO: 15.3 K/UL (ref 4.8–10.8)

## 2024-07-07 PROCEDURE — 700102 HCHG RX REV CODE 250 W/ 637 OVERRIDE(OP): Performed by: INTERNAL MEDICINE

## 2024-07-07 PROCEDURE — 700102 HCHG RX REV CODE 250 W/ 637 OVERRIDE(OP): Performed by: PHYSICAL MEDICINE & REHABILITATION

## 2024-07-07 PROCEDURE — 700105 HCHG RX REV CODE 258: Performed by: NURSE PRACTITIONER

## 2024-07-07 PROCEDURE — 83735 ASSAY OF MAGNESIUM: CPT

## 2024-07-07 PROCEDURE — 700111 HCHG RX REV CODE 636 W/ 250 OVERRIDE (IP): Performed by: INTERNAL MEDICINE

## 2024-07-07 PROCEDURE — 80048 BASIC METABOLIC PNL TOTAL CA: CPT

## 2024-07-07 PROCEDURE — 99255 IP/OBS CONSLTJ NEW/EST HI 80: CPT | Performed by: INTERNAL MEDICINE

## 2024-07-07 PROCEDURE — A9270 NON-COVERED ITEM OR SERVICE: HCPCS | Performed by: INTERNAL MEDICINE

## 2024-07-07 PROCEDURE — 770022 HCHG ROOM/CARE - ICU (200)

## 2024-07-07 PROCEDURE — A9270 NON-COVERED ITEM OR SERVICE: HCPCS | Performed by: PHYSICAL MEDICINE & REHABILITATION

## 2024-07-07 PROCEDURE — 84295 ASSAY OF SERUM SODIUM: CPT | Mod: 91

## 2024-07-07 PROCEDURE — 70450 CT HEAD/BRAIN W/O DYE: CPT

## 2024-07-07 PROCEDURE — 700101 HCHG RX REV CODE 250: Performed by: INTERNAL MEDICINE

## 2024-07-07 PROCEDURE — 85025 COMPLETE CBC W/AUTO DIFF WBC: CPT

## 2024-07-07 PROCEDURE — 99232 SBSQ HOSP IP/OBS MODERATE 35: CPT | Performed by: STUDENT IN AN ORGANIZED HEALTH CARE EDUCATION/TRAINING PROGRAM

## 2024-07-07 PROCEDURE — 99291 CRITICAL CARE FIRST HOUR: CPT | Performed by: INTERNAL MEDICINE

## 2024-07-07 PROCEDURE — 700105 HCHG RX REV CODE 258: Performed by: INTERNAL MEDICINE

## 2024-07-07 RX ORDER — POTASSIUM CHLORIDE 7.45 MG/ML
10 INJECTION INTRAVENOUS
Status: COMPLETED | OUTPATIENT
Start: 2024-07-07 | End: 2024-07-07

## 2024-07-07 RX ORDER — 3% SODIUM CHLORIDE 3 G/100ML
0-80 INJECTION, SOLUTION INTRAVENOUS CONTINUOUS
Status: DISCONTINUED | OUTPATIENT
Start: 2024-07-07 | End: 2024-07-11

## 2024-07-07 RX ORDER — MAGNESIUM SULFATE HEPTAHYDRATE 40 MG/ML
2 INJECTION, SOLUTION INTRAVENOUS ONCE
Status: COMPLETED | OUTPATIENT
Start: 2024-07-07 | End: 2024-07-07

## 2024-07-07 RX ORDER — MIDAZOLAM HYDROCHLORIDE 1 MG/ML
2 INJECTION INTRAMUSCULAR; INTRAVENOUS ONCE
Status: COMPLETED | OUTPATIENT
Start: 2024-07-07 | End: 2024-07-07

## 2024-07-07 RX ORDER — ALPRAZOLAM 0.25 MG
0.5 TABLET ORAL 3 TIMES DAILY PRN
Status: DISCONTINUED | OUTPATIENT
Start: 2024-07-07 | End: 2024-07-10

## 2024-07-07 RX ORDER — MIDODRINE HYDROCHLORIDE 5 MG/1
5 TABLET ORAL
Status: DISCONTINUED | OUTPATIENT
Start: 2024-07-07 | End: 2024-07-08

## 2024-07-07 RX ADMIN — ONDANSETRON 4 MG: 2 INJECTION INTRAMUSCULAR; INTRAVENOUS at 03:36

## 2024-07-07 RX ADMIN — DOCUSATE SODIUM 100 MG: 100 CAPSULE, LIQUID FILLED ORAL at 05:09

## 2024-07-07 RX ADMIN — SODIUM CHLORIDE 250 ML: 3 INJECTION, SOLUTION INTRAVENOUS at 03:33

## 2024-07-07 RX ADMIN — ASPIRIN 81 MG: 81 TABLET, COATED ORAL at 05:08

## 2024-07-07 RX ADMIN — ALPRAZOLAM 0.5 MG: 0.25 TABLET ORAL at 18:26

## 2024-07-07 RX ADMIN — ENOXAPARIN SODIUM 80 MG: 100 INJECTION SUBCUTANEOUS at 05:08

## 2024-07-07 RX ADMIN — SODIUM CHLORIDE 30 ML/HR: 3 INJECTION, SOLUTION INTRAVENOUS at 10:32

## 2024-07-07 RX ADMIN — POTASSIUM CHLORIDE 10 MEQ: 7.46 INJECTION, SOLUTION INTRAVENOUS at 08:58

## 2024-07-07 RX ADMIN — FOLIC ACID 2 MG: 1 TABLET ORAL at 05:08

## 2024-07-07 RX ADMIN — NOREPINEPHRINE BITARTRATE 0.1 MCG/KG/MIN: 1 INJECTION, SOLUTION, CONCENTRATE INTRAVENOUS at 16:16

## 2024-07-07 RX ADMIN — ROSUVASTATIN CALCIUM 5 MG: 5 TABLET, FILM COATED ORAL at 18:26

## 2024-07-07 RX ADMIN — ALPRAZOLAM 0.5 MG: 0.25 TABLET ORAL at 09:50

## 2024-07-07 RX ADMIN — LORAZEPAM 1 MG: 2 INJECTION INTRAMUSCULAR; INTRAVENOUS at 05:02

## 2024-07-07 RX ADMIN — SODIUM CHLORIDE, POTASSIUM CHLORIDE, SODIUM LACTATE AND CALCIUM CHLORIDE: 600; 310; 30; 20 INJECTION, SOLUTION INTRAVENOUS at 06:38

## 2024-07-07 RX ADMIN — AMITRIPTYLINE HYDROCHLORIDE 100 MG: 100 TABLET, FILM COATED ORAL at 22:32

## 2024-07-07 RX ADMIN — LORAZEPAM 1 MG: 2 INJECTION INTRAMUSCULAR; INTRAVENOUS at 00:53

## 2024-07-07 RX ADMIN — BACLOFEN 5 MG: 10 TABLET ORAL at 05:08

## 2024-07-07 RX ADMIN — MIDODRINE HYDROCHLORIDE 5 MG: 5 TABLET ORAL at 18:26

## 2024-07-07 RX ADMIN — GABAPENTIN 300 MG: 300 CAPSULE ORAL at 18:26

## 2024-07-07 RX ADMIN — SODIUM CHLORIDE 40 ML/HR: 3 INJECTION, SOLUTION INTRAVENOUS at 19:20

## 2024-07-07 RX ADMIN — MIDODRINE HYDROCHLORIDE 5 MG: 5 TABLET ORAL at 10:28

## 2024-07-07 RX ADMIN — MIDAZOLAM HYDROCHLORIDE 2 MG: 1 INJECTION, SOLUTION INTRAMUSCULAR; INTRAVENOUS at 17:20

## 2024-07-07 RX ADMIN — GABAPENTIN 300 MG: 300 CAPSULE ORAL at 05:08

## 2024-07-07 RX ADMIN — POTASSIUM CHLORIDE 10 MEQ: 7.46 INJECTION, SOLUTION INTRAVENOUS at 10:02

## 2024-07-07 RX ADMIN — SODIUM CHLORIDE 200 MEQ: 4 INJECTION, SOLUTION, CONCENTRATE INTRAVENOUS at 09:42

## 2024-07-07 RX ADMIN — ENOXAPARIN SODIUM 80 MG: 100 INJECTION SUBCUTANEOUS at 18:25

## 2024-07-07 RX ADMIN — NOREPINEPHRINE BITARTRATE 0.12 MCG/KG/MIN: 1 INJECTION, SOLUTION, CONCENTRATE INTRAVENOUS at 01:09

## 2024-07-07 RX ADMIN — MAGNESIUM SULFATE HEPTAHYDRATE 2 G: 2 INJECTION, SOLUTION INTRAVENOUS at 08:57

## 2024-07-07 ASSESSMENT — PAIN DESCRIPTION - PAIN TYPE
TYPE: ACUTE PAIN

## 2024-07-07 ASSESSMENT — ENCOUNTER SYMPTOMS
FLANK PAIN: 0
INSOMNIA: 1
SENSORY CHANGE: 1
DEPRESSION: 0
SHORTNESS OF BREATH: 0
NAUSEA: 0
BACK PAIN: 0
ABDOMINAL PAIN: 0
HEADACHES: 1
FOCAL WEAKNESS: 1
COUGH: 0
DIARRHEA: 0
DOUBLE VISION: 0
SORE THROAT: 0
BRUISES/BLEEDS EASILY: 0
MYALGIAS: 0
FEVER: 0
NERVOUS/ANXIOUS: 1
DIZZINESS: 0
SPEECH CHANGE: 0

## 2024-07-07 NOTE — PROGRESS NOTES
Critical Care Progress Note    Date of admission  7/5/2024    Chief Complaint  33 y.o. female admitted 7/5/2024 with acute CVA    Hospital Course  33 y.o. 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  She presented to [facility] complaining of sudden onset of left-sided weakness and confusion at 2330 last night.  She was seen in the emergency department at [facility] but symptoms resolved at that time.  She had a normal head CT without contrast but a CTA showed a right M3 occlusion.  She was transferred to [facility] without requiring thrombolytics.  While in the ED, she again developed symptoms and a repeat CTA at that time showed an M2 occlusion for which she was administered TNK and went to IR for mechanical thrombectomy.  Unfortunately the thrombectomy was unsuccessful and resulted in an ICA dissection and essentially TICI 0 flow.  She was brought to the intensive care unit and I was consulted for critical care management.    Interval Problem Update  Reviewed last 24 hour events:   - remains on levophed gtt   - oriented, ongoing L sided weakness, anxious needing IV ativan overnight   - NSR, -150   - AF, increasing WBC   - Hgb down to 11, plts down to 502   - Na not yet at goal on 3% bolus --> changed to gtt, additional 23% given   - low K, Mag    Review of Systems  Review of Systems   Constitutional:  Positive for malaise/fatigue. Negative for fever.   HENT:  Negative for sore throat.    Eyes:  Negative for double vision.   Respiratory:  Negative for cough and shortness of breath.    Cardiovascular:  Negative for chest pain.   Gastrointestinal:  Negative for abdominal pain, diarrhea and nausea.   Genitourinary:  Negative for flank pain.   Musculoskeletal:  Negative for back pain and myalgias.   Skin:  Negative for itching.    Neurological:  Positive for sensory change, focal weakness and headaches. Negative for dizziness and speech change.   Endo/Heme/Allergies:  Does not bruise/bleed easily.   Psychiatric/Behavioral:  Negative for depression. The patient is nervous/anxious and has insomnia.    All other systems reviewed and are negative.       Vital Signs for last 24 hours   Temp:  [36.4 °C (97.6 °F)-36.6 °C (97.9 °F)] 36.4 °C (97.6 °F)  Pulse:  [] 63  Resp:  [6-140] 53  BP: (101-211)/(56-85) 101/79  SpO2:  [88 %-98 %] 92 %    Respiratory Information for the last 24 hours   Remains on room air    Physical Exam   Physical Exam  Vitals and nursing note reviewed.   Constitutional:       General: She is sleeping.      Appearance: She is well-developed and overweight. She is not toxic-appearing.      Interventions: She is restrained.      Comments: Agitated this morning   HENT:      Head: Normocephalic.      Nose: Nose normal.      Mouth/Throat:      Mouth: Mucous membranes are moist.      Pharynx: Oropharynx is clear.   Eyes:      Conjunctiva/sclera: Conjunctivae normal.      Pupils: Pupils are equal, round, and reactive to light.      Comments: Eyes closed but able to open to command   Neck:      Vascular: No JVD.      Trachea: No tracheal deviation.   Cardiovascular:      Rate and Rhythm: Normal rate and regular rhythm. Occasional Extrasystoles are present.     Chest Wall: PMI is not displaced.      Pulses: Normal pulses.      Heart sounds: Normal heart sounds. No murmur heard.     Comments: BP still being augmented on norepinephrine gtt  Pulmonary:      Effort: Pulmonary effort is normal. No tachypnea or respiratory distress.      Breath sounds: No wheezing, rhonchi or rales.      Comments: Protecting airway  Abdominal:      General: Bowel sounds are normal. There is no distension.      Palpations: Abdomen is soft.      Tenderness: There is no abdominal tenderness. There is no guarding or rebound.   Musculoskeletal:          General: No tenderness.      Cervical back: Neck supple.      Right lower leg: No edema.      Left lower leg: No edema.      Comments: R arterial puncture site with mild oozing and hematoma   Skin:     General: Skin is warm and dry.      Capillary Refill: Capillary refill takes less than 2 seconds.      Findings: No rash.   Neurological:      Mental Status: She is oriented to person, place, and time and easily aroused.      Cranial Nerves: Cranial nerve deficit present.      Sensory: Sensory deficit present.      Motor: Weakness present.      Comments: More agitated and anxious this morning when with wrist restraint in place, oriented x 2-3, persistent left-sided weakness and neglect with facial droop   Psychiatric:         Behavior: Behavior is cooperative.         Medications  Current Facility-Administered Medications   Medication Dose Route Frequency Provider Last Rate Last Admin    baclofen (Lioresal) tablet 5 mg  5 mg Oral TID ASTRID ProctorO.   5 mg at 07/07/24 0508    3% sodium chloride (Hypertonic Saline) 500mL infusion 250 mL  250 mL Intravenous Q6HRS PRN BLANCA Pope   Stopped at 07/07/24 0348    labetalol (Normodyne/Trandate) injection 10 mg  10 mg Intravenous Q10 MIN PRN Jeremy M Gonda, M.D.        hydrALAZINE (Apresoline) injection 10 mg  10 mg Intravenous Q2HRS PRN Jeremy M Gonda, M.D.        acetaminophen (Tylenol) tablet 650 mg  650 mg Oral Q6HRS PRN Jeremy M Gonda, M.D.        ondansetron (Zofran) syringe/vial injection 4 mg  4 mg Intravenous Q4HRS PRN Jeremy M Gonda, M.D.   4 mg at 07/07/24 0336    ondansetron (Zofran ODT) dispertab 4 mg  4 mg Oral Q4HRS PRN Jeremy M Gonda, M.D.        promethazine (Phenergan) tablet 12.5-25 mg  12.5-25 mg Oral Q4HRS PRN Jeremy M Gonda, M.D.        promethazine (Phenergan) suppository 12.5-25 mg  12.5-25 mg Rectal Q4HRS PRN Jeremy M Gonda, M.D.        prochlorperazine (Compazine) injection 5-10 mg  5-10 mg Intravenous Q4HRS PRN Jeremy M Gonda, M.D.    5 mg at 07/05/24 1322    LORazepam (Ativan) injection 0.5-1 mg  0.5-1 mg Intravenous Q4HRS PRN Jeremy M Gonda, M.D.   1 mg at 07/07/24 0502    amitriptyline (Elavil) tablet 100 mg  100 mg Oral QHS PRN Jeremy M Gonda, M.D.        docusate sodium (Colace) capsule 100 mg  100 mg Oral BID Jeremy M Gonda, M.D.   100 mg at 07/07/24 0509    folic acid (Folvite) tablet 2 mg  2 mg Oral DAILY Jeremy M Gonda, M.D.   2 mg at 07/07/24 0508    gabapentin (Neurontin) capsule 300 mg  300 mg Oral BID Jeremy M Gonda, M.D.   300 mg at 07/07/24 0508    norepinephrine (Levophed) 8 mg in 250 mL NS infusion (premix)  0-1 mcg/kg/min Intravenous Continuous Jeremy M Gonda, M.D. 24.9 mL/hr at 07/07/24 0545 0.16 mcg/kg/min at 07/07/24 0545    rosuvastatin (Crestor) tablet 5 mg  5 mg Oral Q EVENING Jeremy M Gonda, M.D.   5 mg at 07/06/24 1740    enoxaparin (Lovenox) inj 80 mg  1 mg/kg Subcutaneous Q12HRS Jeremy M Gonda, M.D.   80 mg at 07/07/24 0508    aspirin EC tablet 81 mg  81 mg Oral DAILY Jeremy M Gonda, M.D.   81 mg at 07/07/24 0508    HYDROcodone-acetaminophen (Norco) 5-325 MG per tablet 1 Tablet  1 Tablet Oral Q8HRS PRN Jeremy M Gonda, M.D.        lactated ringers infusion   Intravenous Continuous Betty Tenorio 100 mL/hr at 07/07/24 0638 New Bag at 07/07/24 0638       Fluids    Intake/Output Summary (Last 24 hours) at 7/7/2024 0649  Last data filed at 7/7/2024 0600  Gross per 24 hour   Intake 3480.85 ml   Output 2350 ml   Net 1130.85 ml       Laboratory          Recent Labs     07/05/24  0456 07/05/24  0930 07/06/24  0440 07/06/24  1820 07/07/24  0219   SODIUM 139  --  143 141 144   POTASSIUM 4.1  --  3.7  --  3.9   CHLORIDE 107  --  107  --  108   CO2 19*  --  15*  --  23   BUN 8  --  4*  --  3*   CREATININE 0.59  --  0.60  --  0.56   MAGNESIUM  --  1.8 2.1  --  1.8   CALCIUM 8.8  --  8.8  --  8.4*     Recent Labs     07/05/24  0140 07/05/24  0456 07/06/24  0440 07/07/24  0219   ALTSGPT 25 16  --   --    ASTSGOT 26 25  --   --     ALKPHOSPHAT 63 70  --   --    TBILIRUBIN 0.5 0.3  --   --    GLUCOSE 121* 112* 142* 134*     Recent Labs     07/05/24  0140 07/05/24  0456 07/05/24  0533 07/06/24  0440 07/07/24  0219   WBC 13.4*  --  12.3* 14.6* 15.3*   NEUTSPOLYS  --   --  80.50* 80.20* 69.60   LYMPHOCYTES  --   --  11.40* 11.50* 19.00*   MONOCYTES  --   --  4.40 7.70 10.70   EOSINOPHILS  --   --  2.90 0.00 0.10   BASOPHILS  --   --  0.20 0.20 0.30   ASTSGOT 26 25  --   --   --    ALTSGPT 25 16  --   --   --    ALKPHOSPHAT 63 70  --   --   --    TBILIRUBIN 0.5 0.3  --   --   --      Recent Labs     07/05/24  0533 07/05/24  0930 07/05/24  0950 07/06/24  0440 07/07/24  0219   RBC 4.02*  --   --  3.64* 3.23*   HEMOGLOBIN 12.9  --   --  11.8* 10.6*   HEMATOCRIT 37.9  --   --  34.5* 31.4*   PLATELETCT 526*  --   --  566* 502*   PROTHROMBTM  --  13.3 13.9  --   --    APTT  --  21.7* <20.0*  --   --    INR  --  1.00 1.06  --   --        Imaging  CT:    Reviewed    Assessment/Plan  * Acute ischemic right MCA stroke (HCC)- (present on admission)  Assessment & Plan  With M2 occlusion s/p TNK 7/5 at 0528, attempted thrombectomy at 0725 with no flow and right distal ICA dissection  Continue systemic anticoagulation with Lovenox, eventual transition to coumadin vs NOAC (heme consulted) Continue antiplatelet with aspirin  Neurology, NSG and neuro IR consulted, prophylactic hemicraniectomy watch for now  Continue statin  Goal -160 with augmentation titrating norepinephrine drip  Continue hypertonic saline today with goal sodium between 150 and 160 given edema (changed to 3% gtt + 23% bolus)  Hematology consulted given hypercoagulable condition, hypercoagulable workup pending  PT/OT/SLP eval  Repeat CT head today per neuro request    SLE (systemic lupus erythematosus related syndrome) (HCC)- (present on admission)  Assessment & Plan  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  Continue hydroxychloroquine and Rinvoq  Hematology  consulted    Thrombocytosis- (present on admission)  Assessment & Plan  Chronic, followed by outpatient hematology in [place]    Mixed dyslipidemia- (present on admission)  Assessment & Plan  Continue rosuvastatin    Anemia  Assessment & Plan  Likely due to hematoma around arterial puncture site - worsening  Daily CBC with conservative transfusion strategy    Hyperglycemia  Assessment & Plan  Improved  Discontinue insulin sliding scale    Hypokalemia  Assessment & Plan  Replete again today and monitor    Hypomagnesemia  Assessment & Plan  Replete again today and monitor    Anxiety- (present on admission)  Assessment & Plan  History of chronic anxiety treated with Ativan, gabapentin  Continue gabapentin, qhs amitriptyline, and as needed xanax today  Reassurance         VTE:  Lovenox  Ulcer: Not Indicated  Lines: Central Line  Ongoing indication addressed and Nunez Catheter  Ongoing indication addressed    I have performed a physical exam and reviewed and updated ROS and Plan today (7/7/2024). In review of yesterday's note (7/6/2024), there are no changes except as documented above.     Patient is critically ill today requiring active management of her norepinephrine drip to augment her blood pressure in the setting of vascular occlusion/stroke. High risk of deterioration and worsening vital organ dysfunction and death without the above critical care interventions.    Discussed patient condition and risk of morbidity and/or mortality with RN, RT, Pharmacy, Charge nurse / hot rounds, Patient, and neurology and neurosurgery. Critical care time = 34 minutes in directly providing and coordinating critical care and extensive data review.  No time overlap and excludes procedures.    Please note that this dictation was created using voice recognition software. I have made every reasonable attempt to correct obvious errors, but there may be errors of grammar and possibly content that I did not discover before finalizing  the note.

## 2024-07-07 NOTE — PROGRESS NOTES
"Pt anxious throughout shift, requiring frequent redirection. Pt swatting and grabbing at staff throwing right leg and arm over side rail multiple times. Pt pulling off telemetry wires, stat lock for east catheter, and dressing to femoral access site. Pt occasionally inappropriate with staff, refusing to keep hospital gown on, slapping her vaginal area, and repeatedly stating to CNA \"Scratch my butt hole. Scratch it, I will pay you\". Pt educated on inappropriate behavior and redirected. PRN administered per MAR for c/o anxiety. SHARONA Tenorio notified, new order placed. Pt wait listed for tele sitter, Charge CRISTIANA osman.    "

## 2024-07-07 NOTE — CARE PLAN
The patient is Watcher - Medium risk of patient condition declining or worsening    Shift Goals  Clinical Goals: Q2 Neuro, SBP <160, Na+ >150  Patient Goals: Sleep  Family Goals: Updates    Progress made toward(s) clinical / shift goals:    Problem: Optimal Care of the Stroke Patient  Goal: Optimal acute care for the stroke patient  Outcome: Progressing     Problem: Neuro Status  Goal: Neuro status will remain stable or improve  Outcome: Progressing     Problem: Hemodynamic Monitoring  Goal: Patient's hemodynamics, fluid balance and neurologic status will be stable or improve  Outcome: Progressing     Problem: Urinary Elimination  Goal: Establish and maintain regular urinary output  Outcome: Progressing     Problem: Bowel Elimination  Goal: Establish and maintain regular bowel function  Outcome: Progressing     Problem: Pain - Standard  Goal: Alleviation of pain or a reduction in pain to the patient’s comfort goal  Outcome: Progressing     Problem: Skin Integrity  Goal: Skin integrity is maintained or improved  Outcome: Progressing     Problem: Fall Risk  Goal: Patient will remain free from falls  Outcome: Progressing       Patient is not progressing towards the following goals:      Problem: Psychosocial - Patient Condition  Goal: Patient's ability to re-evaluate and adapt role responsibilities will improve  Outcome: Not Progressing

## 2024-07-07 NOTE — CARE PLAN
The patient is Watcher - Medium risk of patient condition declining or worsening    Shift Goals  Clinical Goals: Post TNK protocol, stable neuro assessments  Patient Goals: Rest  Family Goals: Updates    Progress made toward(s) clinical / shift goals:        Problem: Pain - Standard  Goal: Alleviation of pain or a reduction in pain to the patient’s comfort goal  Outcome: Progressing       Patient is not progressing towards the following goals: N/A

## 2024-07-07 NOTE — PROGRESS NOTES
Neurosurgery Progress Note    Subjective:  No acute events    Exam:  Awake alert  Speech appropriate  Right  bicep iliopsoas dorsiflexion 5/5  Left upper extremity 0/5, IP dorsiflexion 4 -/5  Sensation present for touch bilateral upper lower extremities    BP  Min: 101/79  Max: 211/81  Pulse  Av.4  Min: 55  Max: 130  Resp  Av  Min: 6  Max: 140  Temp  Av.6 °C (97.8 °F)  Min: 36.4 °C (97.5 °F)  Max: 36.6 °C (97.9 °F)  SpO2  Av.2 %  Min: 85 %  Max: 98 %    No data recorded    Recent Labs     24  0533 24  0440 24  0219   WBC 12.3* 14.6* 15.3*   RBC 4.02* 3.64* 3.23*   HEMOGLOBIN 12.9 11.8* 10.6*   HEMATOCRIT 37.9 34.5* 31.4*   MCV 94.3 94.8 97.2   MCH 32.1 32.4 32.8   MCHC 34.0 34.2 33.8   RDW 46.9 48.1 48.7   PLATELETCT 526* 566* 502*   MPV 9.0 9.4 9.3     Recent Labs     24  0456 24  0440 24  1820 24  0219 24  0815   SODIUM 139 143 141 144 142   POTASSIUM 4.1 3.7  --  3.9  --    CHLORIDE 107 107  --  108  --    CO2 19* 15*  --  23  --    GLUCOSE 112* 142*  --  134*  --    BUN 8 4*  --  3*  --    CREATININE 0.59 0.60  --  0.56  --    CALCIUM 8.8 8.8  --  8.4*  --      Recent Labs     24  0930 24  0950   APTT 21.7* <20.0*   INR 1.00 1.06           Intake/Output                         24 07 - 24 0659 24 - 2459     0024-2470 9250-7059 Total 3239-5537 2897-7426 Total                 Intake    P.O.  480  300 780  --  -- --    P.O. 480 300 780 -- -- --    I.V.  1263.8  1239.3 2503.1  180.7  -- 180.7    Norepinephrine Volume -- 50.8 50.8 -- -- --    Volume (mL) (3% sodium chloride (Hypertonic Saline) 500mL infusion 250 mL) 72.6 -- 72.6 -- -- --    Volume (mL) (lactated ringers infusion) 1191.3 1188.5 2379.7 180.7 -- 180.7    IV Piggyback  197.8  -- 197.8  --  -- --    Volume (mL) (potassium chloride (KCL) ivpb 10 mEq) 197.8 -- 197.8 -- -- --    Total Intake 1941.6 1539.3 3480.9 180.7 -- 180.7       Output     Urine  550  1800 2350  225  -- 225    Output (mL) (Urethral Catheter) 550 1800 2350 225 -- 225    Stool  --  -- --  --  -- --    Number of Times Stooled 0 x 0 x 0 x -- -- --    Total Output 550 1800 2350 225 -- 225       Net I/O     1391.6 -260.7 1130.9 -44.3 -- -44.3              Intake/Output Summary (Last 24 hours) at 7/7/2024 0955  Last data filed at 7/7/2024 0800  Gross per 24 hour   Intake 3461.67 ml   Output 2525 ml   Net 936.67 ml             3% sodium chloride  0-60 mL/hr Continuous    sodium chloride 200 mEq in empty bag 50 mL infusion  200 mEq Once    ALPRAZolam  0.5 mg TID PRN    MD Alert...Adult ICU Electrolyte Replacement per Pharmacy   PHARMACY TO DOSE    magnesium sulfate  2 g Once    PEDS potassium chloride (KCL-PERIPHERAL) IV  10 mEq Q HOUR    midodrine  5 mg TID WITH MEALS    labetalol  10 mg Q10 MIN PRN    hydrALAZINE  10 mg Q2HRS PRN    acetaminophen  650 mg Q6HRS PRN    ondansetron  4 mg Q4HRS PRN    ondansetron  4 mg Q4HRS PRN    promethazine  12.5-25 mg Q4HRS PRN    promethazine  12.5-25 mg Q4HRS PRN    prochlorperazine  5-10 mg Q4HRS PRN    amitriptyline  100 mg QHS PRN    docusate sodium  100 mg BID    folic acid  2 mg DAILY    gabapentin  300 mg BID    NORepinephrine  0-1 mcg/kg/min Continuous    rosuvastatin  5 mg Q EVENING    enoxaparin (LOVENOX) injection  1 mg/kg Q12HRS    aspirin  81 mg DAILY    HYDROcodone-acetaminophen  1 Tablet Q8HRS PRN       Assessment and Plan:  Hospital day # 2 right MCA stroke  POD # N/A  Prophylactic anticoagulation: yes         Start date/time: If clinically indicated given stroke with inability to obtain recanalization and endovascular lab     Brain Compression: Yes Nontraumatic  Improving exam with left leg improved strength.  Remains awake  Continue hypertonic therapy per protocol  Continue observation.  Hemicraniectomy if significant neurologic deterioration with significantly increased cerebral edema and midline shift  Needs anticoagulation for occluded  MCA branch, ICA dissection, however this carries risk of intracranial hemorrhage.

## 2024-07-07 NOTE — PROGRESS NOTES
Neurology Progress Note  Neurohospitalist Service, [facility]    Referring Physician: Jeremy M Gonda, M.D.      Interval History: Patient seen and examined this morning.  Patient agitated writhing in bed asking to sit up.  No new focal neurologic deficits.  No new changes in level of consciousness.  No other reported acute overnight events.    Review of systems: In addition to what is detailed in the HPI and/or updated in the interval history, all other systems reviewed and are negative.    Past Medical History, Past Surgical History and Social History reviewed and unchanged from prior    Medications:    Current Facility-Administered Medications:     3% sodium chloride (Hypertonic Saline) 500mL infusion, 0-60 mL/hr, Intravenous, Continuous, Jeremy M Gonda, M.D., Last Rate: 30 mL/hr at 07/07/24 1032, 30 mL/hr at 07/07/24 1032    ALPRAZolam (Xanax) tablet 0.5 mg, 0.5 mg, Oral, TID PRN, Jeremy M Gonda, M.D., 0.5 mg at 07/07/24 0950    MD Alert...ICU Electrolyte Replacement per Pharmacy, , Other, PHARMACY TO DOSE, Jeremy M Gonda, M.D.    midodrine (Proamatine) tablet 5 mg, 5 mg, Oral, TID WITH MEALS, Jeremy M Gonda, M.D., 5 mg at 07/07/24 1028    labetalol (Normodyne/Trandate) injection 10 mg, 10 mg, Intravenous, Q10 MIN PRN, Jeremy M Gonda, M.D.    hydrALAZINE (Apresoline) injection 10 mg, 10 mg, Intravenous, Q2HRS PRN, Jeremy M Gonda, M.D.    acetaminophen (Tylenol) tablet 650 mg, 650 mg, Oral, Q6HRS PRN, Jeremy M Gonda, M.D.    ondansetron (Zofran) syringe/vial injection 4 mg, 4 mg, Intravenous, Q4HRS PRN, Jeremy M Gonda, M.D., 4 mg at 07/07/24 0336    ondansetron (Zofran ODT) dispertab 4 mg, 4 mg, Oral, Q4HRS PRN, Jeremy M Gonda, M.D.    promethazine (Phenergan) tablet 12.5-25 mg, 12.5-25 mg, Oral, Q4HRS PRN, Jeremy M Gonda, M.D.    promethazine (Phenergan) suppository 12.5-25 mg, 12.5-25 mg, Rectal, Q4HRS PRN, Jeremy M Gonda, M.D.    prochlorperazine (Compazine) injection 5-10 mg, 5-10 mg,  "Intravenous, Q4HRS PRN, Jeremy M Gonda, M.D., 5 mg at 07/05/24 1322    amitriptyline (Elavil) tablet 100 mg, 100 mg, Oral, QHS PRN, Jeremy M Gonda, M.D.    docusate sodium (Colace) capsule 100 mg, 100 mg, Oral, BID, Jeremy M Gonda, M.D., 100 mg at 07/07/24 0509    folic acid (Folvite) tablet 2 mg, 2 mg, Oral, DAILY, Jeremy M Gonda, M.D., 2 mg at 07/07/24 0508    gabapentin (Neurontin) capsule 300 mg, 300 mg, Oral, BID, Jeremy M Gonda, M.D., 300 mg at 07/07/24 0508    norepinephrine (Levophed) 8 mg in 250 mL NS infusion (premix), 0-1 mcg/kg/min, Intravenous, Continuous, Jeremy M Gonda, M.D., Last Rate: 15.6 mL/hr at 07/07/24 1213, 0.1 mcg/kg/min at 07/07/24 1213    rosuvastatin (Crestor) tablet 5 mg, 5 mg, Oral, Q EVENING, Jeremy M Gonda, M.D., 5 mg at 07/06/24 1740    enoxaparin (Lovenox) inj 80 mg, 1 mg/kg, Subcutaneous, Q12HRS, Jeremy M Gonda, M.D., 80 mg at 07/07/24 0508    aspirin EC tablet 81 mg, 81 mg, Oral, DAILY, Jeremy M Gonda, M.D., 81 mg at 07/07/24 0508    HYDROcodone-acetaminophen (Norco) 5-325 MG per tablet 1 Tablet, 1 Tablet, Oral, Q8HRS PRN, Jeremy M Gonda, M.D.    Physical Examination:   BP (!) 197/102   Pulse 68   Temp 37.2 °C (99 °F) (Temporal)   Resp (!) 32   Ht 1.473 m (4' 10\")   Wt 82.5 kg (181 lb 14.1 oz)   LMP 06/12/2024 (Exact Date)   SpO2 97%   BMI 38.01 kg/m²     NEUROLOGICAL EXAM:     Neurologic exam stable without significant changes since yesterday's assessment (7/7/2024).    MENTAL STATUS: Eyes closed but responds briskly to questions, alert and oriented to person, place, time and situation  LANG/SPEECH: Naming and repetition intact, fluent speech, follows simple commands.    CRANIAL NERVES:  II: Pupils equal and reactive, no VF deficits  III, IV, VI: EOM intact, no gaze preference or deviation, no nystagmus.  V: normal sensation in V1, V2, and V3 segments bilaterally  VII: Mild left facial droop, slight asymmetry with smile  VIII: normal hearing to speech  IX, X: normal " palatal elevation, no uvular deviation  XI: 5/5 head turn and 5/5 shoulder shrug bilaterally  XII: midline tongue protrusion    MOTOR: Full strength in RUE/RLE, no movement in LUE/LLE, stiff tone    REFLEXES:  bilateral flexor plantar response, no clonus  SENSORY: Normal to fine touch in all extremities, +DSS loss  COORD: Normal finger to nose, no tremor, no dysmetria  GAIT: Deferrred    Objective Data:    Labs:  Lab Results   Component Value Date/Time    PROTHROMBTM 13.9 07/05/2024 09:50 AM    INR 1.06 07/05/2024 09:50 AM      Lab Results   Component Value Date/Time    WBC 15.3 (H) 07/07/2024 02:19 AM    RBC 3.23 (L) 07/07/2024 02:19 AM    HEMOGLOBIN 10.6 (L) 07/07/2024 02:19 AM    HEMATOCRIT 31.4 (L) 07/07/2024 02:19 AM    MCV 97.2 07/07/2024 02:19 AM    MCH 32.8 07/07/2024 02:19 AM    MCHC 33.8 07/07/2024 02:19 AM    MPV 9.3 07/07/2024 02:19 AM    NEUTSPOLYS 69.60 07/07/2024 02:19 AM    LYMPHOCYTES 19.00 (L) 07/07/2024 02:19 AM    MONOCYTES 10.70 07/07/2024 02:19 AM    EOSINOPHILS 0.10 07/07/2024 02:19 AM    BASOPHILS 0.30 07/07/2024 02:19 AM      Lab Results   Component Value Date/Time    SODIUM 142 07/07/2024 08:15 AM    POTASSIUM 3.9 07/07/2024 02:19 AM    CHLORIDE 108 07/07/2024 02:19 AM    CO2 23 07/07/2024 02:19 AM    GLUCOSE 134 (H) 07/07/2024 02:19 AM    BUN 3 (L) 07/07/2024 02:19 AM    CREATININE 0.56 07/07/2024 02:19 AM    BUNCREATRAT 18 05/26/2017 07:09 AM    GLOMRATE 95 10/31/2023 10:46 AM      Lab Results   Component Value Date/Time    CHOLSTRLTOT 194 07/06/2024 04:40 AM    LDL 81 07/06/2024 04:40 AM    HDL 70 07/06/2024 04:40 AM    TRIGLYCERIDE 217 (H) 07/06/2024 04:40 AM       Lab Results   Component Value Date/Time    ALKPHOSPHAT 70 07/05/2024 04:56 AM    ASTSGOT 25 07/05/2024 04:56 AM    ALTSGPT 16 07/05/2024 04:56 AM    TBILIRUBIN 0.3 07/05/2024 04:56 AM        Imaging/Testing:    I interpreted and/or reviewed the patient's neuroimaging    DX-CHEST-LIMITED (1 VIEW)   Final Result         New  right IJ central venous catheter is in good position without a pneumothorax.      CT-HEAD W/O   Final Result      Diffuse low-attenuation of the right hemisphere is consistent with a MCA territory infarct. Small areas of increased density within this region may represent petechial hemorrhages. There is effacement of the right lateral ventricle with approximately 3    mm of midline shift.               CT-CTA PELVIS WITH & W/O-POST PROCESS   Final Result      1.  No CTA evidence of active extravasation of contrast to suggest active bleeding.   2.  There is a subcutaneous hematoma in the right groin adjacent to the common femoral vessels.   3.  Contrast in the urinary bladder from the angiographic procedure earlier today.      EC-ECHOCARDIOGRAM COMPLETE W/O CONT   Final Result      MR-BRAIN-W/O   Final Result         Acute infarct in the right frontoparietal region and right insula predominantly involving the cortex. Acute infarct also noted in the right caudate and putamen.      Minimal subarachnoid hemorrhage noted in the sylvian fissure and the sulci over the convexity, likely procedure related.      No midline shift or significant mass effect.      IR-THROMBO MECHANICAL ARTERY,INIT   Final Result         33-year-old who presented with left-sided symptoms was found to have a right MCA M3 occlusion with a perfusion defect identified on CT perfusion imaging. Patient underwent emergent mechanical thrombectomy with multiple attempts to recanalize the parietal    M3 branch being unsuccessful. Final angiographic images demonstrate good antegrade flow in the MCA branches other than the occluded right MCA M3 parietal branch.      Short segment dissection of the right ICA just below the skull base was identified on the final angiogram.  Findings discussed with Dr. Fernandez and given the nonprogressive nature of the dissection without flow limitation, the dissection will be managed by    initiation of  "antithrombotic/anticoagulation therapy.      Final recanalization score: TICI 2 C      IKavita was physically present and participated during the entire procedure of the IR-THROMBO MECHANICAL ARTERY,INIT.                  DX-CHEST-PORTABLE (1 VIEW)   Final Result      Mild interstitial prominence could be related to hypoinflation. No consolidation or pleural effusion.      CT-CTA NECK WITH & W/O-POST PROCESSING   Final Result      CT angiogram of the neck within normal limits.      CT-CTA HEAD WITH & W/O-POST PROCESS   Final Result      Occlusion of the superior sylvian branch M2 segment RIGHT middle cerebral artery.      These findings were discussed with GAIL AGUIRRE II on 7/5/2024 5:33 AM.            CT-CEREBRAL PERFUSION ANALYSIS   Final Result      1. Cerebral blood flow less than 30% possibly representing completed infarct = 5 mL. Based on distribution of this finding, this is unlikely to represent artifact.      2. T Max more than 6 seconds possibly representing combination of completed infarct and ischemia = 28 mL. Based on the distribution of this finding, this is unlikely to represent artifact.      3. Mismatched volume possibly representing ischemic brain/penumbra= 23 mL      4.  Please note that this cerebral perfusion study and report is Quantitative and targets supratentorial (cerebral) perfusion for evaluation of large vessel territory acute ischemia/infarction. For example, lacunar infarcts, and brainstem/posterior fossa    ischemia/infarction are not evaluated on this study.  Data acquisition is subject to artifacts which can yield non-anatomically plausible perfusion maps which may be due to motion, bolus timing, signal to noise ratio, or other technical factors.    Perfusion map abnormalities which show non-anatomic distributions are likely artifact.   This study is not \"stand-alone\" and should only be utilized for diagnosis, management/treatment in correlation with CT, CTA, " and/or MRI and clinical factors.         IR-PICC LINE PLACEMENT W/ GUIDANCE > AGE 5    (Results Pending)       Assessment and Plan:  32 y/o F w/ autoimmune disorder, presenting with recurrent Rt-MCA syndrome, now with evidence of a Rt-M2 thrombus on stroke protocol CT.   Discussed risks, benefits, and alternative of IV-TNK with mom (Brandi) and patient, and consensus is to proceed with thrombolytic therapy.  Discussed with Neuro-IR and will proceed to OR for endovascular clot retrieval.  Admit to RICU post-operatively for post-TNK/thrombectomy care.  Unclear etiology of stroke- but given history, most likely related to hypercoagulability from her autoimmune disorder.  Will recommend anticoagulation.  Needs evaluation for antiphospholipid syndrome- as this warrants coumadin therapy as oppose to DOAC. Follow up MRI w/ persistent M2/M3 clot, non-flow limiting Rt-ICA dissection. Therapeutic lovenox initiated and on aspirin for dissection. Patient on hemicrani watch although will have significant risk in setting of need for dual antithrombotic therapy. Started HTS to help mitigate further hemispheric enlargement however, have discussed with family guarded prognosis.     Repeat CTH w/no midline shift but Rt-hemisphere with large territory infarction and swelling. Rt-lateral ventricle nearly obliterated but without other signs of developing hydrocephalus.      Problem list:  -- Right MCA stroke  -- Undefined autoimmunity     Recommendations:  -- Status post IV-TNK, given at 0528 7/5/2024  -- Admitted to RICU on every 2 hours neurochecks  -- SBP goal 110-140; okay to utilize Cardene/grace as needed  -- MRI brain: Acute infarct in the right frontoparietal region and right insula, right caudate and putamen.  Minimal subarachnoid hemorrhage in sylvian fissure and sulci over convexity favored procedural related  -- Started HTS for new or malignant right MCA infarction; goal sodium 150-160; continue to titrate to goal, current Na  142  -- Neurosurgery consult for evaluation hemicrania watch  -- repeat head CT if clinical deterioration  -- Repeat CTH to assess for progressive edema  -- On therapeutic Lovenox in consultation with heme-onc for hypercoagulable state  -- On aspirin secondary to right ICA dissection  -- A1c: 4.9, LDL: 81  -- ok to continue home crestor 5mg, will aim for LDL goal < 70  -- attain hypercoagulable labs:     --- Negative for antiphospholipid antibodies, negative anticardiolipin/ beta-2 glycoprotein antibodies     --- Factor II mutation, Factor V Leiden, Antithrombin III assay  -- TTE: Grossly normal left ventricle, nondiagnostic bubble study normal left atrial size  -- PT/OT/SLP ok even within first 24 hours    I have performed a physical exam and reviewed and updated ROS and Plan today (7/7/2024).     Rian Lovelace III, MD  ABPN Board Certified in Neurology  Vascular Neurology, [facility]

## 2024-07-08 ENCOUNTER — APPOINTMENT (OUTPATIENT)
Dept: RADIOLOGY | Facility: MEDICAL CENTER | Age: 34
DRG: 023 | End: 2024-07-08
Attending: NURSE PRACTITIONER
Payer: MEDICAID

## 2024-07-08 LAB
ANION GAP SERPL CALC-SCNC: 14 MMOL/L (ref 7–16)
BASOPHILS # BLD AUTO: 0.3 % (ref 0–1.8)
BASOPHILS # BLD: 0.04 K/UL (ref 0–0.12)
BUN SERPL-MCNC: 3 MG/DL (ref 8–22)
CALCIUM SERPL-MCNC: 8.5 MG/DL (ref 8.5–10.5)
CHLORIDE SERPL-SCNC: 112 MMOL/L (ref 96–112)
CO2 SERPL-SCNC: 20 MMOL/L (ref 20–33)
CREAT SERPL-MCNC: 0.56 MG/DL (ref 0.5–1.4)
EOSINOPHIL # BLD AUTO: 0.08 K/UL (ref 0–0.51)
EOSINOPHIL NFR BLD: 0.6 % (ref 0–6.9)
ERYTHROCYTE [DISTWIDTH] IN BLOOD BY AUTOMATED COUNT: 48.9 FL (ref 35.9–50)
FOLATE SERPL-MCNC: >40 NG/ML
GFR SERPLBLD CREATININE-BSD FMLA CKD-EPI: 121 ML/MIN/1.73 M 2
GLUCOSE SERPL-MCNC: 123 MG/DL (ref 65–99)
HCT VFR BLD AUTO: 33 % (ref 37–47)
HGB BLD-MCNC: 10.8 G/DL (ref 12–16)
IMM GRANULOCYTES # BLD AUTO: 0.04 K/UL (ref 0–0.11)
IMM GRANULOCYTES NFR BLD AUTO: 0.3 % (ref 0–0.9)
IRON SATN MFR SERPL: 12 % (ref 15–55)
IRON SERPL-MCNC: 29 UG/DL (ref 40–170)
LYMPHOCYTES # BLD AUTO: 2.66 K/UL (ref 1–4.8)
LYMPHOCYTES NFR BLD: 18.9 % (ref 22–41)
MAGNESIUM SERPL-MCNC: 2 MG/DL (ref 1.5–2.5)
MCH RBC QN AUTO: 31.9 PG (ref 27–33)
MCHC RBC AUTO-ENTMCNC: 32.7 G/DL (ref 32.2–35.5)
MCV RBC AUTO: 97.3 FL (ref 81.4–97.8)
MONOCYTES # BLD AUTO: 0.98 K/UL (ref 0–0.85)
MONOCYTES NFR BLD AUTO: 7 % (ref 0–13.4)
NEUTROPHILS # BLD AUTO: 10.26 K/UL (ref 1.82–7.42)
NEUTROPHILS NFR BLD: 72.9 % (ref 44–72)
NRBC # BLD AUTO: 0.02 K/UL
NRBC BLD-RTO: 0.1 /100 WBC (ref 0–0.2)
PLATELET # BLD AUTO: 453 K/UL (ref 164–446)
PMV BLD AUTO: 9.2 FL (ref 9–12.9)
POTASSIUM SERPL-SCNC: 3.9 MMOL/L (ref 3.6–5.5)
RBC # BLD AUTO: 3.39 M/UL (ref 4.2–5.4)
SODIUM SERPL-SCNC: 145 MMOL/L (ref 135–145)
SODIUM SERPL-SCNC: 145 MMOL/L (ref 135–145)
SODIUM SERPL-SCNC: 146 MMOL/L (ref 135–145)
TIBC SERPL-MCNC: 252 UG/DL (ref 250–450)
UIBC SERPL-MCNC: 223 UG/DL (ref 110–370)
VIT B12 SERPL-MCNC: 273 PG/ML (ref 211–911)
WBC # BLD AUTO: 14.1 K/UL (ref 4.8–10.8)

## 2024-07-08 PROCEDURE — 700105 HCHG RX REV CODE 258: Performed by: INTERNAL MEDICINE

## 2024-07-08 PROCEDURE — 97167 OT EVAL HIGH COMPLEX 60 MIN: CPT

## 2024-07-08 PROCEDURE — 83550 IRON BINDING TEST: CPT

## 2024-07-08 PROCEDURE — 700102 HCHG RX REV CODE 250 W/ 637 OVERRIDE(OP): Performed by: INTERNAL MEDICINE

## 2024-07-08 PROCEDURE — 83540 ASSAY OF IRON: CPT

## 2024-07-08 PROCEDURE — 82607 VITAMIN B-12: CPT

## 2024-07-08 PROCEDURE — 99291 CRITICAL CARE FIRST HOUR: CPT | Performed by: INTERNAL MEDICINE

## 2024-07-08 PROCEDURE — 83735 ASSAY OF MAGNESIUM: CPT

## 2024-07-08 PROCEDURE — 80048 BASIC METABOLIC PNL TOTAL CA: CPT

## 2024-07-08 PROCEDURE — 700111 HCHG RX REV CODE 636 W/ 250 OVERRIDE (IP): Mod: JZ | Performed by: INTERNAL MEDICINE

## 2024-07-08 PROCEDURE — A9270 NON-COVERED ITEM OR SERVICE: HCPCS | Performed by: INTERNAL MEDICINE

## 2024-07-08 PROCEDURE — 770022 HCHG ROOM/CARE - ICU (200)

## 2024-07-08 PROCEDURE — 99233 SBSQ HOSP IP/OBS HIGH 50: CPT | Performed by: PSYCHIATRY & NEUROLOGY

## 2024-07-08 PROCEDURE — 97163 PT EVAL HIGH COMPLEX 45 MIN: CPT

## 2024-07-08 PROCEDURE — 84295 ASSAY OF SERUM SODIUM: CPT | Mod: 91

## 2024-07-08 PROCEDURE — 97530 THERAPEUTIC ACTIVITIES: CPT

## 2024-07-08 PROCEDURE — 85025 COMPLETE CBC W/AUTO DIFF WBC: CPT

## 2024-07-08 PROCEDURE — 82746 ASSAY OF FOLIC ACID SERUM: CPT

## 2024-07-08 PROCEDURE — 99233 SBSQ HOSP IP/OBS HIGH 50: CPT | Performed by: INTERNAL MEDICINE

## 2024-07-08 PROCEDURE — 99292 CRITICAL CARE ADDL 30 MIN: CPT | Performed by: INTERNAL MEDICINE

## 2024-07-08 PROCEDURE — 97535 SELF CARE MNGMENT TRAINING: CPT

## 2024-07-08 PROCEDURE — 700101 HCHG RX REV CODE 250: Performed by: INTERNAL MEDICINE

## 2024-07-08 PROCEDURE — 700111 HCHG RX REV CODE 636 W/ 250 OVERRIDE (IP): Performed by: INTERNAL MEDICINE

## 2024-07-08 RX ORDER — SIMETHICONE 125 MG
125 TABLET,CHEWABLE ORAL 3 TIMES DAILY PRN
Status: DISCONTINUED | OUTPATIENT
Start: 2024-07-08 | End: 2024-07-15 | Stop reason: HOSPADM

## 2024-07-08 RX ORDER — SODIUM CHLORIDE 1 G/1
1 TABLET ORAL EVERY 8 HOURS
Status: DISCONTINUED | OUTPATIENT
Start: 2024-07-08 | End: 2024-07-09

## 2024-07-08 RX ORDER — DEXMEDETOMIDINE HYDROCHLORIDE 4 UG/ML
.1-1.5 INJECTION, SOLUTION INTRAVENOUS CONTINUOUS
Status: DISCONTINUED | OUTPATIENT
Start: 2024-07-08 | End: 2024-07-08

## 2024-07-08 RX ORDER — MIDODRINE HYDROCHLORIDE 5 MG/1
5 TABLET ORAL EVERY 8 HOURS
Status: DISCONTINUED | OUTPATIENT
Start: 2024-07-08 | End: 2024-07-10

## 2024-07-08 RX ORDER — HYDROXYCHLOROQUINE SULFATE 200 MG/1
200 TABLET, FILM COATED ORAL
Status: DISCONTINUED | OUTPATIENT
Start: 2024-07-13 | End: 2024-07-15 | Stop reason: HOSPADM

## 2024-07-08 RX ORDER — HYDROXYCHLOROQUINE SULFATE 200 MG/1
400 TABLET, FILM COATED ORAL
Status: DISCONTINUED | OUTPATIENT
Start: 2024-07-08 | End: 2024-07-15 | Stop reason: HOSPADM

## 2024-07-08 RX ORDER — DEXMEDETOMIDINE HYDROCHLORIDE 4 UG/ML
.1-1.5 INJECTION, SOLUTION INTRAVENOUS CONTINUOUS
Status: DISCONTINUED | OUTPATIENT
Start: 2024-07-08 | End: 2024-07-12

## 2024-07-08 RX ORDER — POTASSIUM CHLORIDE 1500 MG/1
40 TABLET, EXTENDED RELEASE ORAL ONCE
Status: COMPLETED | OUTPATIENT
Start: 2024-07-08 | End: 2024-07-08

## 2024-07-08 RX ORDER — HALOPERIDOL 5 MG/ML
1 INJECTION INTRAMUSCULAR EVERY 4 HOURS PRN
Status: DISCONTINUED | OUTPATIENT
Start: 2024-07-08 | End: 2024-07-15 | Stop reason: HOSPADM

## 2024-07-08 RX ADMIN — ALPRAZOLAM 0.5 MG: 0.25 TABLET ORAL at 01:34

## 2024-07-08 RX ADMIN — SODIUM CHLORIDE 1 G: 1 TABLET ORAL at 21:23

## 2024-07-08 RX ADMIN — ASPIRIN 81 MG: 81 TABLET, COATED ORAL at 05:29

## 2024-07-08 RX ADMIN — SODIUM CHLORIDE 1 G: 1 TABLET ORAL at 10:43

## 2024-07-08 RX ADMIN — POTASSIUM CHLORIDE 40 MEQ: 1500 TABLET, EXTENDED RELEASE ORAL at 08:12

## 2024-07-08 RX ADMIN — ROSUVASTATIN CALCIUM 5 MG: 5 TABLET, FILM COATED ORAL at 17:51

## 2024-07-08 RX ADMIN — GABAPENTIN 300 MG: 300 CAPSULE ORAL at 05:29

## 2024-07-08 RX ADMIN — DOCUSATE SODIUM 100 MG: 100 CAPSULE, LIQUID FILLED ORAL at 05:29

## 2024-07-08 RX ADMIN — SODIUM CHLORIDE 1 G: 1 TABLET ORAL at 14:48

## 2024-07-08 RX ADMIN — ENOXAPARIN SODIUM 80 MG: 100 INJECTION SUBCUTANEOUS at 05:29

## 2024-07-08 RX ADMIN — HYDROXYCHLOROQUINE SULFATE 400 MG: 200 TABLET ORAL at 14:48

## 2024-07-08 RX ADMIN — MIDODRINE HYDROCHLORIDE 5 MG: 5 TABLET ORAL at 14:48

## 2024-07-08 RX ADMIN — SODIUM CHLORIDE 40 ML/HR: 3 INJECTION, SOLUTION INTRAVENOUS at 07:02

## 2024-07-08 RX ADMIN — HYDROCODONE BITARTRATE AND ACETAMINOPHEN 1 TABLET: 5; 325 TABLET ORAL at 01:25

## 2024-07-08 RX ADMIN — SODIUM CHLORIDE 60 ML/HR: 3 INJECTION, SOLUTION INTRAVENOUS at 18:19

## 2024-07-08 RX ADMIN — HALOPERIDOL LACTATE 1 MG: 5 INJECTION, SOLUTION INTRAMUSCULAR at 02:33

## 2024-07-08 RX ADMIN — SIMETHICONE 125 MG: 125 TABLET, CHEWABLE ORAL at 21:23

## 2024-07-08 RX ADMIN — ENOXAPARIN SODIUM 80 MG: 100 INJECTION SUBCUTANEOUS at 17:51

## 2024-07-08 RX ADMIN — ACETAMINOPHEN 650 MG: 325 TABLET, FILM COATED ORAL at 17:51

## 2024-07-08 RX ADMIN — DOCUSATE SODIUM 100 MG: 100 CAPSULE, LIQUID FILLED ORAL at 17:51

## 2024-07-08 RX ADMIN — FOLIC ACID 2 MG: 1 TABLET ORAL at 05:29

## 2024-07-08 RX ADMIN — MIDODRINE HYDROCHLORIDE 5 MG: 5 TABLET ORAL at 08:10

## 2024-07-08 RX ADMIN — GABAPENTIN 300 MG: 300 CAPSULE ORAL at 17:51

## 2024-07-08 RX ADMIN — DEXMEDETOMIDINE 0.2 MCG/KG/HR: 100 INJECTION, SOLUTION INTRAVENOUS at 02:15

## 2024-07-08 ASSESSMENT — COGNITIVE AND FUNCTIONAL STATUS - GENERAL
HELP NEEDED FOR BATHING: A LITTLE
MOVING TO AND FROM BED TO CHAIR: TOTAL
SUGGESTED CMS G CODE MODIFIER MOBILITY: CL
EATING MEALS: A LOT
DRESSING REGULAR UPPER BODY CLOTHING: A LOT
CLIMB 3 TO 5 STEPS WITH RAILING: TOTAL
CLIMB 3 TO 5 STEPS WITH RAILING: TOTAL
PERSONAL GROOMING: A LITTLE
MOVING FROM LYING ON BACK TO SITTING ON SIDE OF FLAT BED: A LITTLE
MOVING TO AND FROM BED TO CHAIR: A LOT
SUGGESTED CMS G CODE MODIFIER DAILY ACTIVITY: CK
EATING MEALS: A LOT
DRESSING REGULAR LOWER BODY CLOTHING: A LOT
MOBILITY SCORE: 12
DRESSING REGULAR LOWER BODY CLOTHING: A LITTLE
MOVING FROM LYING ON BACK TO SITTING ON SIDE OF FLAT BED: A LOT
SUGGESTED CMS G CODE MODIFIER DAILY ACTIVITY: CL
TOILETING: A LOT
STANDING UP FROM CHAIR USING ARMS: A LOT
TOILETING: A LOT
WALKING IN HOSPITAL ROOM: TOTAL
DAILY ACTIVITIY SCORE: 15
SUGGESTED CMS G CODE MODIFIER MOBILITY: CM
WALKING IN HOSPITAL ROOM: TOTAL
MOBILITY SCORE: 9
DAILY ACTIVITIY SCORE: 12
HELP NEEDED FOR BATHING: A LOT
PERSONAL GROOMING: A LOT
TURNING FROM BACK TO SIDE WHILE IN FLAT BAD: A LITTLE
DRESSING REGULAR UPPER BODY CLOTHING: A LOT
STANDING UP FROM CHAIR USING ARMS: A LOT
TURNING FROM BACK TO SIDE WHILE IN FLAT BAD: A LOT

## 2024-07-08 ASSESSMENT — PAIN DESCRIPTION - PAIN TYPE
TYPE: ACUTE PAIN

## 2024-07-08 ASSESSMENT — ENCOUNTER SYMPTOMS
SHORTNESS OF BREATH: 0
NAUSEA: 0
MYALGIAS: 0
DIARRHEA: 0
BACK PAIN: 0
SPEECH CHANGE: 0
DOUBLE VISION: 0
COUGH: 0
BRUISES/BLEEDS EASILY: 0
NERVOUS/ANXIOUS: 1
SORE THROAT: 0
FEVER: 0
SENSORY CHANGE: 1
FLANK PAIN: 0
ABDOMINAL PAIN: 0
HEADACHES: 1
INSOMNIA: 1
DEPRESSION: 0
DIZZINESS: 0
FOCAL WEAKNESS: 1

## 2024-07-08 ASSESSMENT — GAIT ASSESSMENTS: GAIT LEVEL OF ASSIST: UNABLE TO PARTICIPATE

## 2024-07-08 ASSESSMENT — FIBROSIS 4 INDEX: FIB4 SCORE: 0.44

## 2024-07-08 ASSESSMENT — ACTIVITIES OF DAILY LIVING (ADL): TOILETING: INDEPENDENT

## 2024-07-08 NOTE — PROGRESS NOTES
Oncology/Hematology Progress Note               Author: Paul Patton M.D. Date & Time created: 7/8/2024  7:58 AM     Interval History:  Patient remains confused and restrained, on vasopressor and sedation    Review of Systems:  Review of Systems   Unable to perform ROS: Mental status change       Physical Exam:  Physical Exam  Vitals and nursing note reviewed.   HENT:      Head: Normocephalic and atraumatic.      Right Ear: External ear normal.      Left Ear: External ear normal.      Nose: Nose normal.      Mouth/Throat:      Mouth: Mucous membranes are moist.      Pharynx: Oropharynx is clear.   Eyes:      Extraocular Movements: Extraocular movements intact.      Conjunctiva/sclera: Conjunctivae normal.      Pupils: Pupils are equal, round, and reactive to light.   Cardiovascular:      Rate and Rhythm: Normal rate and regular rhythm.      Pulses: Normal pulses.      Heart sounds: Normal heart sounds.   Pulmonary:      Effort: Pulmonary effort is normal.      Breath sounds: Normal breath sounds.   Abdominal:      General: Abdomen is flat. Bowel sounds are normal.      Palpations: Abdomen is soft.   Musculoskeletal:         General: Normal range of motion.      Cervical back: Normal range of motion and neck supple.   Skin:     General: Skin is warm and dry.   Neurological:      General: No focal deficit present.      Mental Status: Mental status is at baseline.   Psychiatric:         Mood and Affect: Mood normal.         Thought Content: Thought content normal.         Labs:          Recent Labs     07/06/24  0440 07/06/24  1820 07/07/24  0219 07/07/24  0815 07/07/24  1420 07/07/24  2030 07/08/24  0250   SODIUM 143   < > 144   < > 141 141 146*   POTASSIUM 3.7  --  3.9  --   --   --  3.9   CHLORIDE 107  --  108  --   --   --  112   CO2 15*  --  23  --   --   --  20   BUN 4*  --  3*  --   --   --  3*   CREATININE 0.60  --  0.56  --   --   --  0.56   MAGNESIUM 2.1  --  1.8  --   --   --  2.0   CALCIUM 8.8  --  8.4*   --   --   --  8.5    < > = values in this interval not displayed.     Recent Labs     24  0250   GLUCOSE 142* 134* 123*     Recent Labs     24  0930 24  0950 24  0440 24  0219 24  0250   RBC  --   --  3.64* 3.23* 3.39*   HEMOGLOBIN  --   --  11.8* 10.6* 10.8*   HEMATOCRIT  --   --  34.5* 31.4* 33.0*   PLATELETCT  --   --  566* 502* 453*   PROTHROMBTM 13.3 13.9  --   --   --    APTT 21.7* <20.0*  --   --   --    INR 1.00 1.06  --   --   --      Recent Labs     24  0250   WBC 14.6* 15.3* 14.1*   NEUTSPOLYS 80.20* 69.60 72.90*   LYMPHOCYTES 11.50* 19.00* 18.90*   MONOCYTES 7.70 10.70 7.00   EOSINOPHILS 0.00 0.10 0.60   BASOPHILS 0.20 0.30 0.30     Recent Labs     24  1820 24  0219 24  0815 24  1420 24  2030 24  0250   SODIUM 143   < > 144   < > 141 141 146*   POTASSIUM 3.7  --  3.9  --   --   --  3.9   CHLORIDE 107  --  108  --   --   --  112   CO2 15*  --  23  --   --   --  20   GLUCOSE 142*  --  134*  --   --   --  123*   BUN 4*  --  3*  --   --   --  3*   CREATININE 0.60  --  0.56  --   --   --  0.56   CALCIUM 8.8  --  8.4*  --   --   --  8.5    < > = values in this interval not displayed.     Hemodynamics:  Temp (24hrs), Av.2 °C (98.9 °F), Min:36.4 °C (97.5 °F), Max:37.4 °C (99.4 °F)  Temperature: 37.1 °C (98.7 °F)  Pulse  Av.1  Min: 55  Max: 167   Blood Pressure: (!) 157/76     Respiratory:    Respiration: (!) 24, Pulse Oximetry: 100 %     Work Of Breathing / Effort: Within Normal Limits  RUL Breath Sounds: Clear, RML Breath Sounds: Clear, RLL Breath Sounds: Clear, CHAPIN Breath Sounds: Clear, LLL Breath Sounds: Clear  Fluids:    Intake/Output Summary (Last 24 hours) at 2024 0758  Last data filed at 2024 0600  Gross per 24 hour   Intake 1185.08 ml   Output 2525 ml   Net -1339.92 ml     Weight: 80.9 kg (178 lb 5.6 oz)  GI/Nutrition:  Orders Placed  This Encounter   Procedures    Diet Order Diet: Level 5 - Minced and Moist; Liquid level: Level 0 - Thin; Fluid modifications: (optional): Free Water Restrictions Only; Tray Modifications (optional): SLP - 1:1 Supervision by Nursing     Standing Status:   Standing     Number of Occurrences:   1     Order Specific Question:   Diet:     Answer:   Level 5 - Minced and Moist [24]     Order Specific Question:   Liquid level     Answer:   Level 0 - Thin     Order Specific Question:   Fluid modifications: (optional)     Answer:   Free Water Restrictions Only [12]     Order Specific Question:   Tray Modifications (optional)     Answer:   SLP - 1:1 Supervision by Nursing     Medical Decision Making, by Problem:  Active Hospital Problems    Diagnosis     *Acute ischemic right MCA stroke (HCC) [I63.511]     Hypokalemia [E87.6]     Hyperglycemia [R73.9]     Anemia [D64.9]     Hypomagnesemia [E83.42]     Thrombocytosis [D75.839]     SLE (systemic lupus erythematosus related syndrome) (HCC) [M32.9]     Mixed dyslipidemia [E78.2]     Anxiety [F41.9]        Plan:  CVA - continue lovenox for now and transition to NOAC when alert and following commands, further management per neurology  Anemia - hospital acquired, likely secondary to phlebotomy, monitor for bleeding, f/u iron, B12 and folate    Quality-Core Measures   Nunez catheter::  Critically Ill - Requiring Accurate Measurement of Urinary Output  DVT prophylaxis pharmacological::  Enoxaparin (Lovenox)  DVT prophylaxis - mechanical:  SCDs  Ulcer Prophylaxis::  Not indicated  Assessed for rehabilitation services:  Patient unable to tolerate rehabilitation therapeutic regimen    Paul Patton M.D.

## 2024-07-08 NOTE — CARE PLAN
The patient is Stable - Low risk of patient condition declining or worsening    Shift Goals  Clinical Goals: Q2 neuro checks, -160, Q6 Na  Patient Goals: sleep  Family Goals: updates as requested    Progress made toward(s) clinical / shift goals:      Problem: Optimal Care of the Stroke Patient  Goal: Optimal acute care for the stroke patient  Outcome: Progressing     Problem: Knowledge Deficit - Stroke Education  Goal: Patient's knowledge of stroke and risk factors will improve  Outcome: Progressing     Problem: Neuro Status  Goal: Neuro status will remain stable or improve  Outcome: Progressing     Problem: Hemodynamic Monitoring  Goal: Patient's hemodynamics, fluid balance and neurologic status will be stable or improve  Outcome: Progressing     Problem: Knowledge Deficit - Standard  Goal: Patient and family/care givers will demonstrate understanding of plan of care, disease process/condition, diagnostic tests and medications  Outcome: Progressing     Problem: Pain - Standard  Goal: Alleviation of pain or a reduction in pain to the patient’s comfort goal  Outcome: Progressing     Problem: Skin Integrity  Goal: Skin integrity is maintained or improved  Outcome: Progressing     Problem: Fall Risk  Goal: Patient will remain free from falls  Outcome: Progressing    Problem: Safety - Medical Restraint  Goal: Remains free of injury from restraints (Restraint for Interference with Medical Device)  Outcome: Progressing    Not progressing;    Problem: Safety - Medical Restraint  Goal: Free from restraint(s) (Restraint for Interference with Medical Device)  Outcome: Not Progressing

## 2024-07-08 NOTE — THERAPY
Occupational Therapy   Initial Evaluation     Patient Name: Ellie Sánchez  Age:  33 y.o., Sex:  female  Medical Record #: [id number]  Today's Date: 7/8/2024     Precautions: (P) Fall Risk, Swallow Precautions  Comments: (P) L deficits, -160 per neurology    Assessment  Patient is 33 y.o. female with hx of autoimmune disorder on multiple immune modulating medications found to have R M2 occlusion s/p thrombectomy attempt, TICI recanalization score 0. Concern for malignant edema. BP within parameters during mobility.     Pt seen for OT evaluation with physical therapy 2/2 significant mobility deficits as well as high acuity requiring two skilled therapists. Pt limited by lethargy, LUE hemiparesis, L bicep tone, impaired postural control, poor L visual tracking, delayed responses, absent LUE sensation impacting ADLs/mobility. Pt oriented and able to follow commands. Pt's mother at bedside and able to confirm PLOF (see below). Will follow for skilled OT services.     Plan    Occupational Therapy Initial Treatment Plan   Treatment Interventions: (P) Self Care / Activities of Daily Living, Adaptive Equipment, Cognitive Skill Development, Neuro Re-Education / Balance, Therapeutic Exercises, Therapeutic Activity, Sensory Integration Techniques  Treatment Frequency: (P) 4 Times per Week  Duration: (P) Until Therapy Goals Met    DC Equipment Recommendations: (P) Unable to determine at this time  Discharge Recommendations: (P) Recommend post-acute placement for additional occupational therapy services prior to discharge home        07/08/24 1448   Initial Contact Note    Initial Contact Note Order Received and Verified, Occupational Therapy Evaluation in Progress with Full Report to Follow.   Prior Living Situation   Prior Services Home-Independent   Housing / Facility 2 Story House   Bathroom Set up Bathtub / Shower Combination   Equipment Owned None   Lives with - Patient's Self Care Capacity   (mom)   Comments Pt  "lives with mother and 10 yo nephew. Baseline she is independent with all ADLs including cooking, cleaning, grocery shopping, and driving. She no longer works.   Prior Level of ADL Function   Self Feeding Independent   Grooming / Hygiene Independent   Bathing Independent   Dressing Independent   Toileting Independent   Prior Level of IADL Function   Medication Management Independent   Laundry Independent   Kitchen Mobility Independent   Finances Independent   Home Management Independent   Shopping Independent   Prior Level Of Mobility Independent Without Device in Community   Driving / Transportation Driving Independent   Precautions   Precautions Fall Risk;Swallow Precautions   Comments L deficits   Pain 0 - 10 Group   Therapist Pain Assessment   (L arm \"stiff\")   Cognition    Cognition / Consciousness X   Ability To Follow Commands 1 Step   Safety Awareness Impaired   New Learning Impaired   Attention Impaired   Comments Pt very lethargic, multiple cues to maintain open eyes, improved alertness once EOB. Delayed responses, able to follow commands with multple cues 2/2 lethargy   Passive ROM Upper Body   Passive ROM Upper Body X   Comments L UE PROM limited by tone   Active ROM Upper Body   Active ROM Upper Body  X   Comments no AROM in LUE, RUE WFL   Strength Upper Body   Upper Body Strength  X   Comments LUE 0/5, RUE grossly 4/5   Sensation Upper Body   Upper Extremity Sensation  X   Lt Upper Extremity Light Touch Absent   Lt Upper Extremity Sharp / Dull Sensation Absent   Lt Upper Extremity Proprioception Impaired   Upper Body Muscle Tone   Upper Body Muscle Tone  X   Lt Upper Extremity Muscle Tone Hypertonic   Comments LUE bicep tone   Coordination Upper Body   Coordination X   Comments limited by weakness   Balance Assessment   Sitting Balance (Static) Poor -   Sitting Balance (Dynamic) Trace +   Standing Balance (Static) Trace +   Weight Shift Sitting Poor   Weight Shift Standing Absent   Bed Mobility  "   Supine to Sit Maximal Assist   Sit to Supine Maximal Assist   Scooting Total Assist   ADL Assessment   Grooming Maximal Assist;Seated   Upper Body Dressing Maximal Assist   Lower Body Dressing Total Assist   Toileting Total Assist   How much help from another person does the patient currently need...   6 Clicks Daily Activity Score 12   mRS Prior to admission   Prior to admission mRS 1   Modified Anthony (mRS)   Modified Oxford Score 5   Functional Mobility   Sit to Stand Maximal Assist   Visual Perception   Visual Perception  X   Comments difficult to assess 2/2 lethargy, max cues to scan L visual field   Patient / Family Goals   Patient / Family Goal #1 to get better   Short Term Goals   Short Term Goal # 1 Pt will protect LUE during bed mobility without vc   Short Term Goal # 2 Pt will attend to L visual field during seated ADL tasks for at least 5 min   Short Term Goal # 3 Pt will demo adonay techniques during seated G/H tasks with min A and postural support   Short Term Goal # 4 Pt will complete ADL txf mod A   Education Group   Education Provided Upper Extremity Range of Motion   Role of Occupational Therapist Patient Response Patient;Family;Acceptance;Explanation   Upper Ext ROM Patient Response Patient;Family;Acceptance;Explanation;Demonstration;Reinforcement Needed   Additional Comments ed/training to family on subluxation risk, safe positioning, LUE ROM at elbow, wrist, no shoulder ROM until clear by therapist   Occupational Therapy Initial Treatment Plan    Treatment Interventions Self Care / Activities of Daily Living;Adaptive Equipment;Cognitive Skill Development;Neuro Re-Education / Balance;Therapeutic Exercises;Therapeutic Activity;Sensory Integration Techniques   Treatment Frequency 4 Times per Week   Duration Until Therapy Goals Met   Problem List   Problem List Decreased Active Daily Living Skills;Decreased Homemaking Skills;Decreased Upper Extremity Strength Left;Decreased Upper Extremity AROM  Left;Decreased Upper Extremity PROM Left;Decreased Functional Mobility;Decreased Activity Tolerance;Safety Awareness Deficits / Cognition;Impaired Posture / Trunk Alignment;Impaired Coordination Left Upper Extremity;Impaired Sensation Left Upper Extremity;Impaired Cognitive Function;Impaired Upper Extremity Tone Left;Impaired Vision;Impaired Postural Control / Balance   Anticipated Discharge Equipment and Recommendations   DC Equipment Recommendations Unable to determine at this time   Discharge Recommendations Recommend post-acute placement for additional occupational therapy services prior to discharge home   Interdisciplinary Plan of Care Collaboration   IDT Collaboration with  Nursing;Family / Caregiver;Physical Therapist   Patient Position at End of Therapy In Bed;Wrist Restraints Applied;Call Light within Reach;Tray Table within Reach;Phone within Reach;Family / Friend in Room   Collaboration Comments RN aware

## 2024-07-08 NOTE — PROGRESS NOTES
Critical Care Progress Note    Date of admission  7/5/2024    Chief Complaint  33 y.o. female admitted 7/5/2024 with acute CVA    Hospital Course  Ms. Sánchez is a 33 y.o. female with the past medical history significant for an undefined autoimmune disorder versus seronegative rheumatoid arthritis on several immunomodulating therapies including methotrexate, hydroxychloroquine, and Rinvoq who presented to [facility] on the night of 7/4 with complaints of sudden onset of left-sided weakness and confusion at 2330.  She [place] [facility] [place] symptoms resolved at that time.  She had a normal head CT without contrast, but a CTA head showed a right M3 occlusion.  She was transferred to [facility] on 7/5/2024 without requiring thrombolytics.  While in the ED, she again developed symptoms and a repeat CTA at that time showed an right M2 occlusion for which she was administered TNK and went to IR for mechanical thrombectomy.  Unfortunately the thrombectomy was unsuccessful and resulted in an ICA dissection with essentially TICI 0 flow.  She was admitted to the intensive care unit for ongoing critical care management.  7/6 - levophed infusion for SBP goals > 130 and < 170, neurosurgery consulted to monitor for need for prophylactic hemocrani, 3% started  7/7 - levo drip, ongoing left sided weakness, 23% bolus needed for reach Na goa    Interval Problem Update  Reviewed last 24 hour events:   - agitated overnight-->precedex overnight   - precedex is off this am   - a/ox4   - follows on the right, not lifting left arm, slight movement to left leg   - SR/ST 70-170s   - SBP > 120 to < 160   - afebrile   - levo at 0.02   - level 5 diet   - O2 at 3-4 lpm NC   - 3 BMs yesterday   - UOP of 1200cc overnight, Nunez   - 3% at 40cc/hr   - dex at 0.8   - no RT issues   - lovenox, weight based   - no abx   - K 3.9   - Na 146    Yesterday's Events:   - remains on levophed gtt   - oriented, ongoing  L sided weakness, anxious needing IV ativan overnight   - NSR, -150   - AF, increasing WBC   - Hgb down to 11, plts down to 502   - Na not yet at goal on 3% bolus --> changed to gtt, additional 23% given   - low K, Mag    Review of Systems  Review of Systems   Constitutional:  Positive for malaise/fatigue. Negative for fever.   HENT:  Negative for sore throat.    Eyes:  Negative for double vision.   Respiratory:  Negative for cough and shortness of breath.    Cardiovascular:  Negative for chest pain.   Gastrointestinal:  Negative for abdominal pain, diarrhea and nausea.   Genitourinary:  Negative for flank pain.   Musculoskeletal:  Negative for back pain and myalgias.   Skin:  Negative for itching.   Neurological:  Positive for sensory change, focal weakness and headaches. Negative for dizziness and speech change.   Endo/Heme/Allergies:  Does not bruise/bleed easily.   Psychiatric/Behavioral:  Negative for depression. The patient is nervous/anxious and has insomnia.    All other systems reviewed and are negative.       Vital Signs for last 24 hours   Temp:  [36.4 °C (97.5 °F)-37.4 °C (99.4 °F)] 37.1 °C (98.7 °F)  Pulse:  [] 72  Resp:  [24-84] 48  BP: (105-197)/() 145/68  SpO2:  [84 %-100 %] 90 %    Respiratory Information for the last 24 hours   Remains on room air    Physical Exam   Physical Exam  Vitals and nursing note reviewed.   Constitutional:       General: She is sleeping.      Appearance: She is well-developed. She is obese. She is ill-appearing. She is not toxic-appearing.      Interventions: She is restrained.      Comments: Agitated this morning   HENT:      Head: Normocephalic.      Right Ear: External ear normal.      Left Ear: External ear normal.      Nose: Nose normal. No rhinorrhea.      Mouth/Throat:      Mouth: Mucous membranes are moist.      Pharynx: Oropharynx is clear.   Eyes:      Conjunctiva/sclera: Conjunctivae normal.      Pupils: Pupils are equal, round, and reactive  to light.      Comments: Eyes closed but able to open to command   Neck:      Vascular: No JVD.      Trachea: No tracheal deviation.   Cardiovascular:      Rate and Rhythm: Normal rate and regular rhythm.      Chest Wall: PMI is not displaced.      Pulses: Normal pulses.           Radial pulses are 2+ on the right side and 2+ on the left side.        Dorsalis pedis pulses are 2+ on the right side and 2+ on the left side.      Heart sounds: Normal heart sounds. No murmur heard.  Pulmonary:      Effort: Pulmonary effort is normal. No tachypnea or respiratory distress.      Breath sounds: No wheezing, rhonchi or rales.      Comments: Protecting airway  Abdominal:      General: Bowel sounds are normal. There is no distension.      Palpations: Abdomen is soft.      Tenderness: There is no abdominal tenderness. There is no guarding or rebound.      Comments: Right groin:  large amount of hematoma/bruising   Musculoskeletal:         General: No tenderness.      Cervical back: Neck supple.      Right lower leg: No edema.      Left lower leg: No edema.   Lymphadenopathy:      Cervical: No cervical adenopathy.   Skin:     General: Skin is warm and dry.      Capillary Refill: Capillary refill takes less than 2 seconds.      Findings: No rash.   Neurological:      Mental Status: She is oriented to person, place, and time and easily aroused.      Cranial Nerves: Cranial nerve deficit present.      Sensory: Sensory deficit present.      Motor: Weakness present.      Comments: More agitated and anxious this morning when with wrist restraint in place, oriented x 2-3, persistent left-sided weakness and neglect with facial droop   Psychiatric:         Behavior: Behavior is cooperative.         Medications  Current Facility-Administered Medications   Medication Dose Route Frequency Provider Last Rate Last Admin    haloperidol lactate (Haldol) injection 1 mg  1 mg Intravenous Q4HRS PRN Pete Dillard M.D.   1 mg at 07/08/24 6297     dexmedetomidine (Precedex) 400 mcg/100mL infusion  0.1-1.5 mcg/kg/hr (Order-Specific) Intravenous Continuous Pete Dillard M.D. 10.9 mL/hr at 07/08/24 0537 1 mcg/kg/hr at 07/08/24 0537    3% sodium chloride (Hypertonic Saline) 500mL infusion  0-60 mL/hr Intravenous Continuous Jeremy M Gonda, M.D. 40 mL/hr at 07/08/24 0336 40 mL/hr at 07/08/24 0336    ALPRAZolam (Xanax) tablet 0.5 mg  0.5 mg Oral TID PRN Jeremy M Gonda, M.D.   0.5 mg at 07/08/24 0134    MD Alert...ICU Electrolyte Replacement per Pharmacy   Other PHARMACY TO DOSE Jeremy M Gonda, M.D.        midodrine (Proamatine) tablet 5 mg  5 mg Oral TID WITH MEALS Jeremy M Gonda, M.D.   5 mg at 07/07/24 1826    labetalol (Normodyne/Trandate) injection 10 mg  10 mg Intravenous Q10 MIN PRN Jeremy M Gonda, M.D.        hydrALAZINE (Apresoline) injection 10 mg  10 mg Intravenous Q2HRS PRN Jeremy M Gonda, M.D.        acetaminophen (Tylenol) tablet 650 mg  650 mg Oral Q6HRS PRN Jeremy M Gonda, M.D.        ondansetron (Zofran) syringe/vial injection 4 mg  4 mg Intravenous Q4HRS PRN Jeremy M Gonda, M.D.   4 mg at 07/07/24 0336    ondansetron (Zofran ODT) dispertab 4 mg  4 mg Oral Q4HRS PRN Jeremy M Gonda, M.D.        promethazine (Phenergan) tablet 12.5-25 mg  12.5-25 mg Oral Q4HRS PRN Jeremy M Gonda, M.D.        promethazine (Phenergan) suppository 12.5-25 mg  12.5-25 mg Rectal Q4HRS PRN Jeremy M Gonda, M.D.        prochlorperazine (Compazine) injection 5-10 mg  5-10 mg Intravenous Q4HRS PRN Jeremy M Gonda, M.D.   5 mg at 07/05/24 1322    amitriptyline (Elavil) tablet 100 mg  100 mg Oral QHS PRN Jeremy M Gonda, M.D.   100 mg at 07/07/24 2232    docusate sodium (Colace) capsule 100 mg  100 mg Oral BID Jeremy M Gonda, M.D.   100 mg at 07/08/24 0529    folic acid (Folvite) tablet 2 mg  2 mg Oral DAILY Jeremy M Gonda, M.D.   2 mg at 07/08/24 0529    gabapentin (Neurontin) capsule 300 mg  300 mg Oral BID Jeremy M Gonda, M.D.   300 mg at 07/08/24 0529    norepinephrine  (Levophed) 8 mg in 250 mL NS infusion (premix)  0-1 mcg/kg/min Intravenous Continuous Jeremy M Gonda, M.D. 3.1 mL/hr at 07/08/24 0524 0.02 mcg/kg/min at 07/08/24 0524    rosuvastatin (Crestor) tablet 5 mg  5 mg Oral Q EVENING Jeremy M Gonda, M.D.   5 mg at 07/07/24 1826    enoxaparin (Lovenox) inj 80 mg  1 mg/kg Subcutaneous Q12HRS Jeremy M Gonda, M.D.   80 mg at 07/08/24 0529    aspirin EC tablet 81 mg  81 mg Oral DAILY Jeremy M Gonda, M.D.   81 mg at 07/08/24 0529    HYDROcodone-acetaminophen (Norco) 5-325 MG per tablet 1 Tablet  1 Tablet Oral Q8HRS PRN Jeremy M Gonda, M.D.   1 Tablet at 07/08/24 0125       Fluids    Intake/Output Summary (Last 24 hours) at 7/8/2024 0618  Last data filed at 7/8/2024 0600  Gross per 24 hour   Intake 1185.08 ml   Output 2525 ml   Net -1339.92 ml       Laboratory          Recent Labs     07/06/24 0440 07/06/24  1820 07/07/24 0219 07/07/24  0815 07/07/24  1420 07/07/24  2030 07/08/24  0250   SODIUM 143   < > 144   < > 141 141 146*   POTASSIUM 3.7  --  3.9  --   --   --  3.9   CHLORIDE 107  --  108  --   --   --  112   CO2 15*  --  23  --   --   --  20   BUN 4*  --  3*  --   --   --  3*   CREATININE 0.60  --  0.56  --   --   --  0.56   MAGNESIUM 2.1  --  1.8  --   --   --  2.0   CALCIUM 8.8  --  8.4*  --   --   --  8.5    < > = values in this interval not displayed.     Recent Labs     07/06/24 0440 07/07/24 0219 07/08/24  0250   GLUCOSE 142* 134* 123*     Recent Labs     07/06/24 0440 07/07/24 0219 07/08/24  0250   WBC 14.6* 15.3* 14.1*   NEUTSPOLYS 80.20* 69.60 72.90*   LYMPHOCYTES 11.50* 19.00* 18.90*   MONOCYTES 7.70 10.70 7.00   EOSINOPHILS 0.00 0.10 0.60   BASOPHILS 0.20 0.30 0.30     Recent Labs     07/05/24  0930 07/05/24  0950 07/06/24  0440 07/07/24 0219 07/08/24  0250   RBC  --   --  3.64* 3.23* 3.39*   HEMOGLOBIN  --   --  11.8* 10.6* 10.8*   HEMATOCRIT  --   --  34.5* 31.4* 33.0*   PLATELETCT  --   --  566* 502* 453*   PROTHROMBTM 13.3 13.9  --   --   --    APTT  21.7* <20.0*  --   --   --    INR 1.00 1.06  --   --   --        Imaging  CT:    Reviewed    Assessment/Plan  * Acute ischemic right MCA stroke (HCC)- (present on admission)  Assessment & Plan  With M2 occlusion s/p TNK 7/5 at 0528, attempted thrombectomy at 0725 with no flow and right distal ICA dissection  Continue systemic anticoagulation with Lovenox, eventual transition to coumadin vs NOAC (heme consulted) Continue antiplatelet with aspirin  Neurology, NSG and neuro IR consulted, prophylactic hemicraniectomy watch for now  Continue statin  Goal -160 with augmentation titrating norepinephrine drip  Continue hypertonic saline today with goal sodium between 150 and 160 given edema (changed to 3% gtt + 23% bolus)  Hematology consulted given hypercoagulable condition, hypercoagulable workup pending  PT/OT/SLP eval  Repeat CT head today per neuro request    Anemia  Assessment & Plan  Likely due to hematoma around arterial puncture site - worsening  Daily CBC with conservative transfusion strategy    Hyperglycemia  Assessment & Plan  Improved  Discontinue insulin sliding scale    Hypokalemia  Assessment & Plan  Replete again today and monitor.    Hypomagnesemia  Assessment & Plan  Repleted    SLE (systemic lupus erythematosus related syndrome) (HCC)- (present on admission)  Assessment & Plan  Followed by Dr. Morales for Lupus, RA, and thrombocytosis  Continue hydroxychloroquine and will stop Rinvoq due to increased cardiovascular phenomenon   Hematology consulted    Thrombocytosis- (present on admission)  Assessment & Plan  Chronic, followed by outpatient hematology in [place].    Mixed dyslipidemia- (present on admission)  Assessment & Plan  Continue rosuvastatin    Anxiety- (present on admission)  Assessment & Plan  History of chronic anxiety treated with Ativan, gabapentin  Continue gabapentin, qhs amitriptyline, and as needed xanax today  Reassurance  Precedex with RASS goal of -1 to +1         VTE:   Lovenox  Ulcer: Not Indicated  Lines: Central Line  Ongoing indication addressed and Nunez Catheter  Ongoing indication addressed    I have performed a physical exam and reviewed and updated ROS and Plan today (7/8/2024). In review of yesterday's note (7/7/2024), there are no changes except as documented above.     The patient is critically ill today requiring active titration of norepinephrine for SBP goals greater than 120 as well as close neurological monitoring.  I assessed and reassessed the patient's hemodynamics, cardiovascular status, and neurological status.  The patient remains at high risk of clinical deterioration, worsening vital organ dysfunction, and death without the above critical care interventions.    Discussed patient condition and risk of morbidity and/or mortality with RN, RT, Pharmacy, Charge nurse / hot rounds, Patient, and neurology and neurosurgery. Critical care time = 106 minutes in directly providing and coordinating critical care and extensive data review.  No time overlap and excludes procedures.

## 2024-07-08 NOTE — PROGRESS NOTES
Neurology Progress Note  Neurohospitalist Service, [facility]    Referring Physician: Lili Epps M.D.    Chief Complaint   Patient presents with    Sent by MD    Weakness     Transfer from [facility] for further evaluation of unilateral weakness.        HPI: Refer to initial documented Neurology H&P, as detailed in the patient's chart.    Interval History 7/8: No new events overnight.    Review of systems: In addition to what is detailed in the HPI and/or updated in the interval history, all other systems reviewed and are negative.    Past Medical History:    has a past medical history of Abdominal pain, left upper quadrant, Anxiety, Bronchitis, Chest pain, Cough, Depression, Early satiety, Gastritis, Helicobacter pylori (H. pylori), Jaw pain, Migraine, Musculoskeletal pain, Otitis media, Right wrist pain, Sinus disorder, Sinusitis, SOB (shortness of breath), Thrombocytosis, Tonsillitis, URI (upper respiratory infection), and Yeast infection.    FHx:  family history includes Cancer in her paternal grandfather; Diabetes in her maternal grandmother, mother, paternal grandfather, and paternal grandmother; Heart Disease in her maternal grandmother; Hypertension in her father.    SHx:   reports that she has never smoked. She has never used smokeless tobacco. She reports that she does not drink alcohol and does not use drugs.    Medications:    Current Facility-Administered Medications:     haloperidol lactate (Haldol) injection 1 mg, 1 mg, Intravenous, Q4HRS PRN, Pete Dillard M.D., 1 mg at 07/08/24 0233    dexmedetomidine (Precedex) 400 mcg/100mL infusion, 0.1-1.5 mcg/kg/hr (Order-Specific), Intravenous, Continuous, Pete Dillard M.D., Last Rate: 8.7 mL/hr at 07/08/24 0630, 0.8 mcg/kg/hr at 07/08/24 0630    3% sodium chloride (Hypertonic Saline) 500mL infusion, 0-60 mL/hr, Intravenous, Continuous, Jeremy M Gonda, M.D., Last Rate: 40 mL/hr at 07/08/24 0702, 40 mL/hr at 07/08/24  0702    ALPRAZolam (Xanax) tablet 0.5 mg, 0.5 mg, Oral, TID PRN, Jeremy M Gonda, M.D., 0.5 mg at 07/08/24 0134    MD Alert...ICU Electrolyte Replacement per Pharmacy, , Other, PHARMACY TO DOSE, Jeremy M Gonda, M.D.    midodrine (Proamatine) tablet 5 mg, 5 mg, Oral, TID WITH MEALS, Jeremy M Gonda, M.D., 5 mg at 07/08/24 0810    labetalol (Normodyne/Trandate) injection 10 mg, 10 mg, Intravenous, Q10 MIN PRN, Jeremy M Gonda, M.D.    hydrALAZINE (Apresoline) injection 10 mg, 10 mg, Intravenous, Q2HRS PRN, Jeremy M Gonda, M.D.    acetaminophen (Tylenol) tablet 650 mg, 650 mg, Oral, Q6HRS PRN, Jeremy M Gonda, M.D.    ondansetron (Zofran) syringe/vial injection 4 mg, 4 mg, Intravenous, Q4HRS PRN, Jeremy M Gonda, M.D., 4 mg at 07/07/24 0336    ondansetron (Zofran ODT) dispertab 4 mg, 4 mg, Oral, Q4HRS PRN, Jeremy M Gonda, M.D.    promethazine (Phenergan) tablet 12.5-25 mg, 12.5-25 mg, Oral, Q4HRS PRN, Jeremy M Gonda, M.D.    promethazine (Phenergan) suppository 12.5-25 mg, 12.5-25 mg, Rectal, Q4HRS PRN, Jeremy M Gonda, M.D.    prochlorperazine (Compazine) injection 5-10 mg, 5-10 mg, Intravenous, Q4HRS PRN, Jeremy M Gonda, M.D., 5 mg at 07/05/24 1322    amitriptyline (Elavil) tablet 100 mg, 100 mg, Oral, QHS PRN, Jeremy M Gonda, M.D., 100 mg at 07/07/24 2232    docusate sodium (Colace) capsule 100 mg, 100 mg, Oral, BID, Jeremy M Gonda, M.D., 100 mg at 07/08/24 0529    folic acid (Folvite) tablet 2 mg, 2 mg, Oral, DAILY, Jeremy M Gonda, M.D., 2 mg at 07/08/24 0529    gabapentin (Neurontin) capsule 300 mg, 300 mg, Oral, BID, Jeremy M Gonda, M.D., 300 mg at 07/08/24 0529    norepinephrine (Levophed) 8 mg in 250 mL NS infusion (premix), 0-1 mcg/kg/min, Intravenous, Continuous, Jeremy M Gonda, M.D., Last Rate: 3.1 mL/hr at 07/08/24 0524, 0.02 mcg/kg/min at 07/08/24 0524    rosuvastatin (Crestor) tablet 5 mg, 5 mg, Oral, Q EVENING, Jeremy M Gonda, M.D., 5 mg at 07/07/24 1826    enoxaparin (Lovenox) inj 80 mg, 1 mg/kg,  Subcutaneous, Q12HRS, Jeremy M Gonda, M.D., 80 mg at 07/08/24 0529    aspirin EC tablet 81 mg, 81 mg, Oral, DAILY, Jeremy M Gonda, M.D., 81 mg at 07/08/24 0529    HYDROcodone-acetaminophen (Norco) 5-325 MG per tablet 1 Tablet, 1 Tablet, Oral, Q8HRS PRN, Jeremy M Gonda, M.D., 1 Tablet at 07/08/24 0125    Physical Examination:     Vitals:    07/08/24 0700 07/08/24 0715 07/08/24 0730 07/08/24 0745   BP: (!) 159/74 (!) 147/68 (!) 146/69 (!) 157/76   Pulse: (!) 58 (!) 56 (!) 55 60   Resp:  (!) 21 (!) 27 (!) 24   Temp:       TempSrc:       SpO2: 99% 100% 100% 100%   Weight:       Height:               NEUROLOGICAL EXAM:     Patient is awake and alert.  She follows commands.  Able to name objects.  However patient appears to have baseline mild MR.  Pupils equal reactive.  No gaze preference.  Left facial weakness.  Blinks to threat to both sides.  Extraocular muscles appear to be intact.  Moves the right arm and leg spontaneously antigravity.  Left lower extremity is also antigravity but has a drift.  Minimal movement in the left upper extremity.  No antigravity movement.  Response to tactile touch in the right side.  Less so on the left.  Possible neglect to the left.      Objective Data:    Labs:  Lab Results   Component Value Date/Time    PROTHROMBTM 13.9 07/05/2024 09:50 AM    INR 1.06 07/05/2024 09:50 AM      Lab Results   Component Value Date/Time    WBC 14.1 (H) 07/08/2024 02:50 AM    RBC 3.39 (L) 07/08/2024 02:50 AM    HEMOGLOBIN 10.8 (L) 07/08/2024 02:50 AM    HEMATOCRIT 33.0 (L) 07/08/2024 02:50 AM    MCV 97.3 07/08/2024 02:50 AM    MCH 31.9 07/08/2024 02:50 AM    MCHC 32.7 07/08/2024 02:50 AM    MPV 9.2 07/08/2024 02:50 AM    NEUTSPOLYS 72.90 (H) 07/08/2024 02:50 AM    LYMPHOCYTES 18.90 (L) 07/08/2024 02:50 AM    MONOCYTES 7.00 07/08/2024 02:50 AM    EOSINOPHILS 0.60 07/08/2024 02:50 AM    BASOPHILS 0.30 07/08/2024 02:50 AM      Lab Results   Component Value Date/Time    SODIUM 146 (H) 07/08/2024 02:50 AM     POTASSIUM 3.9 07/08/2024 02:50 AM    CHLORIDE 112 07/08/2024 02:50 AM    CO2 20 07/08/2024 02:50 AM    GLUCOSE 123 (H) 07/08/2024 02:50 AM    BUN 3 (L) 07/08/2024 02:50 AM    CREATININE 0.56 07/08/2024 02:50 AM    BUNCREATRAT 18 05/26/2017 07:09 AM    GLOMRATE 95 10/31/2023 10:46 AM      Lab Results   Component Value Date/Time    CHOLSTRLTOT 194 07/06/2024 04:40 AM    LDL 81 07/06/2024 04:40 AM    HDL 70 07/06/2024 04:40 AM    TRIGLYCERIDE 217 (H) 07/06/2024 04:40 AM       Lab Results   Component Value Date/Time    ALKPHOSPHAT 70 07/05/2024 04:56 AM    ASTSGOT 25 07/05/2024 04:56 AM    ALTSGPT 16 07/05/2024 04:56 AM    TBILIRUBIN 0.3 07/05/2024 04:56 AM        Imaging/Testing:  CT-HEAD W/O   Final Result         1. Limited by motion.   2. No definite new abnormality.               DX-CHEST-LIMITED (1 VIEW)   Final Result         New right IJ central venous catheter is in good position without a pneumothorax.      CT-HEAD W/O   Final Result      Diffuse low-attenuation of the right hemisphere is consistent with a MCA territory infarct. Small areas of increased density within this region may represent petechial hemorrhages. There is effacement of the right lateral ventricle with approximately 3    mm of midline shift.               CT-CTA PELVIS WITH & W/O-POST PROCESS   Final Result      1.  No CTA evidence of active extravasation of contrast to suggest active bleeding.   2.  There is a subcutaneous hematoma in the right groin adjacent to the common femoral vessels.   3.  Contrast in the urinary bladder from the angiographic procedure earlier today.      EC-ECHOCARDIOGRAM COMPLETE W/O CONT   Final Result      MR-BRAIN-W/O   Final Result         Acute infarct in the right frontoparietal region and right insula predominantly involving the cortex. Acute infarct also noted in the right caudate and putamen.      Minimal subarachnoid hemorrhage noted in the sylvian fissure and the sulci over the convexity, likely procedure  related.      No midline shift or significant mass effect.      IR-THROMBO MECHANICAL ARTERY,INIT   Final Result         33-year-old who presented with left-sided symptoms was found to have a right MCA M3 occlusion with a perfusion defect identified on CT perfusion imaging. Patient underwent emergent mechanical thrombectomy with multiple attempts to recanalize the parietal    M3 branch being unsuccessful. Final angiographic images demonstrate good antegrade flow in the MCA branches other than the occluded right MCA M3 parietal branch.      Short segment dissection of the right ICA just below the skull base was identified on the final angiogram.  Findings discussed with Dr. Fernandez and given the nonprogressive nature of the dissection without flow limitation, the dissection will be managed by    initiation of antithrombotic/anticoagulation therapy.      Final recanalization score: TICI 2 C      I, Kavita Matson was physically present and participated during the entire procedure of the IR-THROMBO MECHANICAL ARTERY,INIT.                  DX-CHEST-PORTABLE (1 VIEW)   Final Result      Mild interstitial prominence could be related to hypoinflation. No consolidation or pleural effusion.      CT-CTA NECK WITH & W/O-POST PROCESSING   Final Result      CT angiogram of the neck within normal limits.      CT-CTA HEAD WITH & W/O-POST PROCESS   Final Result      Occlusion of the superior sylvian branch M2 segment RIGHT middle cerebral artery.      These findings were discussed with GAIL AGUIRRE II on 7/5/2024 5:33 AM.            CT-CEREBRAL PERFUSION ANALYSIS   Final Result      1. Cerebral blood flow less than 30% possibly representing completed infarct = 5 mL. Based on distribution of this finding, this is unlikely to represent artifact.      2. T Max more than 6 seconds possibly representing combination of completed infarct and ischemia = 28 mL. Based on the distribution of this finding, this is unlikely to represent  "artifact.      3. Mismatched volume possibly representing ischemic brain/penumbra= 23 mL      4.  Please note that this cerebral perfusion study and report is Quantitative and targets supratentorial (cerebral) perfusion for evaluation of large vessel territory acute ischemia/infarction. For example, lacunar infarcts, and brainstem/posterior fossa    ischemia/infarction are not evaluated on this study.  Data acquisition is subject to artifacts which can yield non-anatomically plausible perfusion maps which may be due to motion, bolus timing, signal to noise ratio, or other technical factors.    Perfusion map abnormalities which show non-anatomic distributions are likely artifact.   This study is not \"stand-alone\" and should only be utilized for diagnosis, management/treatment in correlation with CT, CTA, and/or MRI and clinical factors.               Assessment and Plan:    33-year-old female on multiple immune modulating medications for on identified underlying autoimmune disorder.  Possible underlying undiagnosed MRI.  Presenting with a right M2 occlusion.  Status post thrombectomy attempt which failed with TICI score 0.  Also complicated by carotid dissection.  Currently on Lovenox.  MRI brain confirmed right-sided MCA territory infarct.  Patient's been placed on hypertonic therapy for concern for malignant edema.  Post stroke day #3.  For now we will continue with hypertonic therapy.  Blood pressure has been very  labile for the past day and a half.  Ranging from the low 100s up to 190 systolic.  Recommend keeping a tighter control of this.  Will modified blood pressure parameters slightly to help avoid pressors or IV medications.  Will change and ranged from 130 systolic down to 120 systolic.  Up to 160 systolic.    Plan:  1.  Slight change in blood pressure parameters lowered to 120 systolic up to 160 systolic.  Try to avoid large changes.  2.  Continue with anticoagulation with Lovenox.  3.  We recommend " repeat echo stiffer study with per report was a difficult study.  Recommend repeating with contrast.  4.  Continue telemonitoring  5.  Okay to continue home Crestor for an LDL goal below 70.  6.  Continue with aspirin.  7.  Continue with PT and OT speech therapy.        Spent over 62 minutes reviewing the chart including images, labs, examined the patient and going to care plan the primary team.    This chart was partially generated using voice recognition technology and sound alike word replacement may be present, best efforts were made to make the chart accurate.    Preet Jacob MD  Board Certified Neurology, ABPN  (t) [phone]

## 2024-07-08 NOTE — THERAPY
Physical Therapy   Initial Evaluation     Patient Name: Ellie Sánchez  Age:  33 y.o., Sex:  female  Medical Record #: [id number]  Today's Date: 7/8/2024     Precautions  Precautions: Fall Risk;Swallow Precautions  Comments: L deficits    Assessment  Patient is 33 y.o. female presented on 7/5/2024 with sudden onset of left-sided weakness and confusion     Currently been managed for acute ischemic R MCA stroke, SLE, thrombocytosis, hypokalemia, hypomagnesemia     She was administered TNK and went to IR for mechanical thrombectomy. Unfortunately the thrombectomy was unsuccessful and resulted in an ICA dissection and essentially TICI 0 flow     MRI Brain 7/5: Acute infarct in the right frontoparietal region, right insula predominantly involving the cortex, right caudate and putamen.     PMH: DLP, anemia, hyperglycemia, anxiety     Patient seen for PT evaluation and treatment. Patient in bed, agreeable for the session. Able to demonstrate functional mobility tasks as detailed below. Currently appears to be below baseline level of functional mobility. Will continue to benefit from PT services to help improve overall functional mobility. Recommend post-acute placement at this time.     Plan    Physical Therapy Initial Treatment Plan   Treatment Plan : Bed Mobility, Equipment, Family / Caregiver Training, Neuro Re-Education / Balance, Therapeutic Activities, Therapeutic Exercise  Treatment Frequency: 5 Times per Week  Duration: Until Therapy Goals Met    DC Equipment Recommendations: Unable to determine at this time  Discharge Recommendations: Recommend post-acute placement for additional physical therapy services prior to discharge home    Objective     07/08/24 1446   Initial Contact Note    Initial Contact Note Order Received and Verified, Physical Therapy Evaluation in Progress with Full Report to Follow.   Precautions   Precautions Fall Risk;Swallow Precautions;Other (See Comments)   Comments SBP goal: 120-160; L  side deficits; s/p TNK   Vitals   Pulse 96   Blood Pressure (!) 150/63   Pulse Oximetry 95 %   O2 Delivery Device None - Room Air   Vitals Comments Post activity: /71, , SpO2 97   Pain   Pain Scales 0 to 10 Scale    Intervention Medication (see MAR);Repositioned;Rest   Pain 0 - 10 Group   Therapist Pain Assessment During Activity  (Patient reporting pain when moving her LUE)   Prior Living Situation   Prior Services Home-Independent   Housing / Facility 2 Story House   Steps Into Home 3   Steps In Home   (FOS with B/L rails to bedroom/ bathroom)   Rail None   Equipment Owned None   Lives with - Patient's Self Care Capacity Parents;Child Less than 18 Years of Age;Other (Comments)  (Mother, Son)   Prior Level of Functional Mobility   Bed Mobility Independent   Transfer Status Independent   Ambulation Independent   Ambulation Distance Community   Assistive Devices Used None   Stairs Independent   Comments Per mom, patient is currently unemployed due to her lupus. She has not been working for the last 6 years.   History of Falls   History of Falls Yes   Date of Last Fall   (Mom reports H/O several falls in the last few months, associated with weakness from lupus flare up.)   Cognition    Cognition / Consciousness X   Speech/ Communication Delayed Responses   Level of Consciousness Responds to voice   Ability To Follow Commands 1 Step   Safety Awareness Impaired   Attention Impaired   Sequencing Impaired   Passive ROM Lower Body   Passive ROM Lower Body WDL   Active ROM Lower Body    Active ROM Lower Body  X   Comments RLE-WDL; LLE-No active movements seen this session   Strength Lower Body   Lower Body Strength  X   Comments Grossly RLE 4/5, LLE 0/5   Sensation Lower Body   Lower Extremity Sensation   X   Comments Able to identify light touch on LLE, unable to confirm decreased sensation as compared to RLE   Lower Body Muscle Tone   Lower Body Muscle Tone  X   Lt Lower Extremity Muscle Tone Hypertonic    Modified Rene Scale Left Lower Extremity 1  (Knee extension, Ankle DF)   Coordination Lower Body    Coordination Lower Body  X   Comments Unable to assess due to inadequate strength in LLE   Other Treatments   Other Treatments Provided Discussed with patient and her mother regarding DC recommendations, mom prefers patient closer to a facility in [place]. Educated about appropriate positioning on LLE-prevent ER-use of towel rolls to help with positioning. Educated on ankle PF/DF with end range stretch.   Balance Assessment   Sitting Balance (Static) Poor -   Sitting Balance (Dynamic) Trace +   Standing Balance (Static) Trace +   Standing Balance (Dynamic) Dependent   Weight Shift Sitting Poor   Weight Shift Standing Absent   Comments Seated EOB-cues for appropriate R foot placement on the platform (tends to keep moving her R foot off the platform), cues for weight shifts; appropriate placement of RUE to assist with balance; Standing with hand held assist x 2   Bed Mobility    Supine to Sit Maximal Assist   Sit to Supine Maximal Assist   Scooting Maximal Assist   Rolling Maximal Assist to Rt.   Comments HOB raised, use of bed rail, towards R side of the bed, cues for sequencing of task. Platform step placed underneath to facilitate appropriate sitting posture and allow proprioceptive feedback via B/L foot since patient's feet wouldnt reach the floor.   Gait Analysis   Gait Level Of Assist Unable to Participate   Functional Mobility   Sit to Stand Maximal Assist  (x2 person assist; standing on the platform)   Bed, Chair, Wheelchair Transfer Unable to Participate  (Due to fluctuating level of alertness)   Mobility Bed-EOB-sitting balance, posture correction, sitting tolerance-sit-stand x 2 times-EOB-bed   Comments Hand held assist x 2; cues for appropriate posture, assistance to guard/ block L knee; able to tolerate standing for < 30s.   6 Clicks Assessment - How much HELP from from another person do you  currently need... (If the patient hasn't done an activity recently, how much help from another person do you think he/she would need if he/she tried?)   Turning from your back to your side while in a flat bed without using bedrails? 2   Moving from lying on your back to sitting on the side of a flat bed without using bedrails? 2   Moving to and from a bed to a chair (including a wheelchair)? 1   Standing up from a chair using your arms (e.g., wheelchair, or bedside chair)? 2   Walking in hospital room? 1   Climbing 3-5 steps with a railing? 1   6 clicks Mobility Score 9   Edema / Skin Assessment   Edema / Skin  X   Comments LUE edema (+)   Patient / Family Goals    Patient / Family Goal #1 To get stronger and return home   Short Term Goals    Short Term Goal # 1 Patient will perform supine-sit, sit-supine with HOB raised with use of bed rail with CGA in 6 visits to progress with bed mobility   Short Term Goal # 2 Patient will perform sit-stand with LRAD with Min A in 6 visits to progress with functional mobility   Short Term Goal # 3 Patient will perform chair transfers with LRAD with Min A in 6 visits to progress with OOB to chair for meals   Short Term Goal # 4 Patient will sit EOB with Fair (-) balance, with CGA, for about 20 minutes to improve upright sitting and sitting balance.   Education Group   Education Provided Role of Physical Therapist   Role of Physical Therapist Patient Response Patient;Family;Acceptance;Explanation;Verbal Demonstration   Physical Therapy Initial Treatment Plan    Treatment Plan  Bed Mobility;Equipment;Family / Caregiver Training;Neuro Re-Education / Balance;Therapeutic Activities;Therapeutic Exercise   Treatment Frequency 5 Times per Week   Duration Until Therapy Goals Met   Problem List    Problems Impaired Bed Mobility;Impaired Transfers;Impaired Ambulation;Functional ROM Deficit;Functional Strength Deficit;Impaired Balance;Impaired Coordination;Impaired Vision;Decreased Activity  Tolerance;Safety Awareness Deficits / Cognition;Motor Planning / Sequencing   Anticipated Discharge Equipment and Recommendations   DC Equipment Recommendations Unable to determine at this time   Discharge Recommendations Recommend post-acute placement for additional physical therapy services prior to discharge home   Interdisciplinary Plan of Care Collaboration   IDT Collaboration with  Nursing;Family / Caregiver;Occupational Therapist   Patient Position at End of Therapy In Bed;Wrist Restraints Applied;Call Light within Reach;Tray Table within Reach;Family / Friend in Room  (Mom at bedside)   Session Information   Date / Session Number  7/8-1(1/5, 7/14)   Priority   (Acute CVA)     Patient seen for team evaluation with Occupational Therapist for the following reason(s):  Patient required 2 person assistance for safety and to provide effective interventions. Each discipline assisted patient with appropriate and separate goals.    Due to the medical complexity, the skill of both practitioners is needed to monitor vitals, patient status, and adjust the intervention to fit the patient's needs and goals.

## 2024-07-08 NOTE — DISCHARGE PLANNING
PM&R consult done. Liliya Burk recommending;  -Patient has suffered a large right MCA stroke.  She has poor motor strength on the left, increased tone, and significant lethargy that is affecting her ability to function on the right.  She also has incoordination with fine motor movements on the right.  She will need extensive rehab.  Unclear at this time if patient will qualify for inpatient rehab because she will need a lot of of family support.   -PMR to follow in the periphery for rehab appropriateness, please reach out with questions or request for medical management    Would appreciate therapy evaluations once clinically appropriate.    TCC remains following

## 2024-07-09 ENCOUNTER — APPOINTMENT (OUTPATIENT)
Dept: RADIOLOGY | Facility: MEDICAL CENTER | Age: 34
DRG: 023 | End: 2024-07-09
Attending: INTERNAL MEDICINE
Payer: MEDICAID

## 2024-07-09 LAB
ANION GAP SERPL CALC-SCNC: 11 MMOL/L (ref 7–16)
BASOPHILS # BLD AUTO: 0.5 % (ref 0–1.8)
BASOPHILS # BLD: 0.04 K/UL (ref 0–0.12)
BUN SERPL-MCNC: 2 MG/DL (ref 8–22)
CALCIUM SERPL-MCNC: 8.2 MG/DL (ref 8.5–10.5)
CHLORIDE SERPL-SCNC: 112 MMOL/L (ref 96–112)
CO2 SERPL-SCNC: 20 MMOL/L (ref 20–33)
CREAT SERPL-MCNC: 0.36 MG/DL (ref 0.5–1.4)
EOSINOPHIL # BLD AUTO: 0.36 K/UL (ref 0–0.51)
EOSINOPHIL NFR BLD: 4.8 % (ref 0–6.9)
ERYTHROCYTE [DISTWIDTH] IN BLOOD BY AUTOMATED COUNT: 50.5 FL (ref 35.9–50)
GFR SERPLBLD CREATININE-BSD FMLA CKD-EPI: 135 ML/MIN/1.73 M 2
GLUCOSE SERPL-MCNC: 123 MG/DL (ref 65–99)
HCT VFR BLD AUTO: 29.7 % (ref 37–47)
HGB BLD-MCNC: 9.9 G/DL (ref 12–16)
IMM GRANULOCYTES # BLD AUTO: 0.03 K/UL (ref 0–0.11)
IMM GRANULOCYTES NFR BLD AUTO: 0.4 % (ref 0–0.9)
LYMPHOCYTES # BLD AUTO: 1.56 K/UL (ref 1–4.8)
LYMPHOCYTES NFR BLD: 20.7 % (ref 22–41)
MAGNESIUM SERPL-MCNC: 1.7 MG/DL (ref 1.5–2.5)
MCH RBC QN AUTO: 33 PG (ref 27–33)
MCHC RBC AUTO-ENTMCNC: 33.3 G/DL (ref 32.2–35.5)
MCV RBC AUTO: 99 FL (ref 81.4–97.8)
MONOCYTES # BLD AUTO: 0.49 K/UL (ref 0–0.85)
MONOCYTES NFR BLD AUTO: 6.5 % (ref 0–13.4)
NEUTROPHILS # BLD AUTO: 5.05 K/UL (ref 1.82–7.42)
NEUTROPHILS NFR BLD: 67.1 % (ref 44–72)
NRBC # BLD AUTO: 0.03 K/UL
NRBC BLD-RTO: 0.4 /100 WBC (ref 0–0.2)
PLATELET # BLD AUTO: 370 K/UL (ref 164–446)
PMV BLD AUTO: 9.3 FL (ref 9–12.9)
POTASSIUM SERPL-SCNC: 3.6 MMOL/L (ref 3.6–5.5)
RBC # BLD AUTO: 3 M/UL (ref 4.2–5.4)
SODIUM SERPL-SCNC: 142 MMOL/L (ref 135–145)
SODIUM SERPL-SCNC: 143 MMOL/L (ref 135–145)
WBC # BLD AUTO: 7.5 K/UL (ref 4.8–10.8)

## 2024-07-09 PROCEDURE — 85025 COMPLETE CBC W/AUTO DIFF WBC: CPT

## 2024-07-09 PROCEDURE — 700102 HCHG RX REV CODE 250 W/ 637 OVERRIDE(OP): Performed by: INTERNAL MEDICINE

## 2024-07-09 PROCEDURE — L4398 FOOT DROP SPLINT PRE OTS: HCPCS

## 2024-07-09 PROCEDURE — A9270 NON-COVERED ITEM OR SERVICE: HCPCS | Performed by: INTERNAL MEDICINE

## 2024-07-09 PROCEDURE — 99292 CRITICAL CARE ADDL 30 MIN: CPT | Performed by: INTERNAL MEDICINE

## 2024-07-09 PROCEDURE — 700105 HCHG RX REV CODE 258: Performed by: INTERNAL MEDICINE

## 2024-07-09 PROCEDURE — 700111 HCHG RX REV CODE 636 W/ 250 OVERRIDE (IP): Mod: JZ | Performed by: INTERNAL MEDICINE

## 2024-07-09 PROCEDURE — 97530 THERAPEUTIC ACTIVITIES: CPT

## 2024-07-09 PROCEDURE — 99292 CRITICAL CARE ADDL 30 MIN: CPT | Performed by: NURSE PRACTITIONER

## 2024-07-09 PROCEDURE — 83735 ASSAY OF MAGNESIUM: CPT

## 2024-07-09 PROCEDURE — 770022 HCHG ROOM/CARE - ICU (200)

## 2024-07-09 PROCEDURE — 700111 HCHG RX REV CODE 636 W/ 250 OVERRIDE (IP): Performed by: INTERNAL MEDICINE

## 2024-07-09 PROCEDURE — 73030 X-RAY EXAM OF SHOULDER: CPT | Mod: LT

## 2024-07-09 PROCEDURE — 92526 ORAL FUNCTION THERAPY: CPT

## 2024-07-09 PROCEDURE — 99291 CRITICAL CARE FIRST HOUR: CPT | Performed by: INTERNAL MEDICINE

## 2024-07-09 PROCEDURE — 99232 SBSQ HOSP IP/OBS MODERATE 35: CPT | Performed by: PSYCHIATRY & NEUROLOGY

## 2024-07-09 PROCEDURE — 92523 SPEECH SOUND LANG COMPREHEN: CPT

## 2024-07-09 PROCEDURE — 80048 BASIC METABOLIC PNL TOTAL CA: CPT

## 2024-07-09 PROCEDURE — 700101 HCHG RX REV CODE 250: Performed by: INTERNAL MEDICINE

## 2024-07-09 PROCEDURE — 84295 ASSAY OF SERUM SODIUM: CPT | Mod: 91

## 2024-07-09 RX ORDER — SODIUM CHLORIDE 1 G/1
2 TABLET ORAL ONCE
Status: COMPLETED | OUTPATIENT
Start: 2024-07-09 | End: 2024-07-09

## 2024-07-09 RX ORDER — FLUDROCORTISONE ACETATE 0.1 MG/1
0.1 TABLET ORAL 2 TIMES DAILY
Status: DISCONTINUED | OUTPATIENT
Start: 2024-07-09 | End: 2024-07-12

## 2024-07-09 RX ORDER — POTASSIUM CHLORIDE 1500 MG/1
60 TABLET, EXTENDED RELEASE ORAL ONCE
Status: COMPLETED | OUTPATIENT
Start: 2024-07-09 | End: 2024-07-09

## 2024-07-09 RX ORDER — SODIUM CHLORIDE 1 G/1
3 TABLET ORAL EVERY 8 HOURS
Status: DISCONTINUED | OUTPATIENT
Start: 2024-07-09 | End: 2024-07-12

## 2024-07-09 RX ORDER — MAGNESIUM SULFATE HEPTAHYDRATE 40 MG/ML
2 INJECTION, SOLUTION INTRAVENOUS ONCE
Status: COMPLETED | OUTPATIENT
Start: 2024-07-09 | End: 2024-07-09

## 2024-07-09 RX ADMIN — SODIUM CHLORIDE 75 ML/HR: 3 INJECTION, SOLUTION INTRAVENOUS at 02:54

## 2024-07-09 RX ADMIN — HYDROCODONE BITARTRATE AND ACETAMINOPHEN 1 TABLET: 5; 325 TABLET ORAL at 03:32

## 2024-07-09 RX ADMIN — SODIUM CHLORIDE 3 G: 1 TABLET ORAL at 13:20

## 2024-07-09 RX ADMIN — SIMETHICONE 125 MG: 125 TABLET, CHEWABLE ORAL at 19:34

## 2024-07-09 RX ADMIN — ROSUVASTATIN CALCIUM 5 MG: 5 TABLET, FILM COATED ORAL at 17:33

## 2024-07-09 RX ADMIN — ASPIRIN 81 MG: 81 TABLET, COATED ORAL at 06:33

## 2024-07-09 RX ADMIN — SODIUM CHLORIDE 70 ML/HR: 3 INJECTION, SOLUTION INTRAVENOUS at 19:07

## 2024-07-09 RX ADMIN — FOLIC ACID 2 MG: 1 TABLET ORAL at 06:33

## 2024-07-09 RX ADMIN — MAGNESIUM SULFATE HEPTAHYDRATE 2 G: 2 INJECTION, SOLUTION INTRAVENOUS at 08:10

## 2024-07-09 RX ADMIN — ENOXAPARIN SODIUM 80 MG: 100 INJECTION SUBCUTANEOUS at 17:33

## 2024-07-09 RX ADMIN — SODIUM CHLORIDE 60 ML/HR: 3 INJECTION, SOLUTION INTRAVENOUS at 10:44

## 2024-07-09 RX ADMIN — ENOXAPARIN SODIUM 80 MG: 100 INJECTION SUBCUTANEOUS at 06:33

## 2024-07-09 RX ADMIN — FLUDROCORTISONE ACETATE 0.1 MG: 0.1 TABLET ORAL at 19:34

## 2024-07-09 RX ADMIN — DOCUSATE SODIUM 100 MG: 100 CAPSULE, LIQUID FILLED ORAL at 06:33

## 2024-07-09 RX ADMIN — HYDROXYCHLOROQUINE SULFATE 400 MG: 200 TABLET ORAL at 06:34

## 2024-07-09 RX ADMIN — ALPRAZOLAM 0.5 MG: 0.25 TABLET ORAL at 20:33

## 2024-07-09 RX ADMIN — GABAPENTIN 300 MG: 300 CAPSULE ORAL at 06:33

## 2024-07-09 RX ADMIN — SODIUM CHLORIDE 3 G: 1 TABLET ORAL at 22:08

## 2024-07-09 RX ADMIN — ALPRAZOLAM 0.5 MG: 0.25 TABLET ORAL at 23:52

## 2024-07-09 RX ADMIN — HYDROCODONE BITARTRATE AND ACETAMINOPHEN 1 TABLET: 5; 325 TABLET ORAL at 23:53

## 2024-07-09 RX ADMIN — GABAPENTIN 300 MG: 300 CAPSULE ORAL at 17:33

## 2024-07-09 RX ADMIN — ALPRAZOLAM 0.5 MG: 0.25 TABLET ORAL at 03:32

## 2024-07-09 RX ADMIN — HALOPERIDOL LACTATE 1 MG: 5 INJECTION, SOLUTION INTRAMUSCULAR at 23:08

## 2024-07-09 RX ADMIN — SIMETHICONE 125 MG: 125 TABLET, CHEWABLE ORAL at 13:17

## 2024-07-09 RX ADMIN — ACETAMINOPHEN 650 MG: 325 TABLET, FILM COATED ORAL at 13:20

## 2024-07-09 RX ADMIN — AMITRIPTYLINE HYDROCHLORIDE 100 MG: 100 TABLET, FILM COATED ORAL at 22:08

## 2024-07-09 RX ADMIN — ACETAMINOPHEN 650 MG: 325 TABLET, FILM COATED ORAL at 19:34

## 2024-07-09 RX ADMIN — SODIUM CHLORIDE 250 MG: 9 INJECTION, SOLUTION INTRAVENOUS at 13:22

## 2024-07-09 RX ADMIN — SODIUM CHLORIDE 2 G: 1 TABLET ORAL at 08:01

## 2024-07-09 RX ADMIN — DEXMEDETOMIDINE 0.2 MCG/KG/HR: 100 INJECTION, SOLUTION INTRAVENOUS at 23:27

## 2024-07-09 RX ADMIN — POTASSIUM CHLORIDE 60 MEQ: 1500 TABLET, EXTENDED RELEASE ORAL at 08:01

## 2024-07-09 RX ADMIN — SODIUM CHLORIDE 1 G: 1 TABLET ORAL at 06:33

## 2024-07-09 ASSESSMENT — PAIN DESCRIPTION - PAIN TYPE
TYPE: ACUTE PAIN

## 2024-07-09 ASSESSMENT — ENCOUNTER SYMPTOMS
BRUISES/BLEEDS EASILY: 0
DIARRHEA: 0
FLANK PAIN: 0
INSOMNIA: 1
SPEECH CHANGE: 0
SHORTNESS OF BREATH: 0
SENSORY CHANGE: 1
COUGH: 0
SORE THROAT: 0
NAUSEA: 0
NERVOUS/ANXIOUS: 1
DIZZINESS: 0
MYALGIAS: 0
FEVER: 0
DOUBLE VISION: 0
ABDOMINAL PAIN: 0
BACK PAIN: 0
DEPRESSION: 0
HEADACHES: 1
FOCAL WEAKNESS: 1

## 2024-07-09 ASSESSMENT — COGNITIVE AND FUNCTIONAL STATUS - GENERAL
SUGGESTED CMS G CODE MODIFIER MOBILITY: CM
TURNING FROM BACK TO SIDE WHILE IN FLAT BAD: A LOT
MOVING TO AND FROM BED TO CHAIR: TOTAL
CLIMB 3 TO 5 STEPS WITH RAILING: TOTAL
WALKING IN HOSPITAL ROOM: TOTAL
STANDING UP FROM CHAIR USING ARMS: A LOT
MOBILITY SCORE: 9
MOVING FROM LYING ON BACK TO SITTING ON SIDE OF FLAT BED: A LOT

## 2024-07-09 ASSESSMENT — GAIT ASSESSMENTS: GAIT LEVEL OF ASSIST: UNABLE TO PARTICIPATE

## 2024-07-09 NOTE — PROGRESS NOTES
Neurology Progress Note  Neurohospitalist Service, [facility]    Referring Physician: Lili Epps M.D.    Chief Complaint   Patient presents with    Sent by MD    Weakness     Transfer from [facility] for further evaluation of unilateral weakness.        HPI: Refer to initial documented Neurology H&P, as detailed in the patient's chart.    Interval History 7/9: No new events overnight.    Review of systems: In addition to what is detailed in the HPI and/or updated in the interval history, all other systems reviewed and are negative.    Past Medical History:    has a past medical history of Abdominal pain, left upper quadrant, Anxiety, Bronchitis, Chest pain, Cough, Depression, Early satiety, Gastritis, Helicobacter pylori (H. pylori), Jaw pain, Migraine, Musculoskeletal pain, Otitis media, Right wrist pain, Sinus disorder, Sinusitis, SOB (shortness of breath), Thrombocytosis, Tonsillitis, URI (upper respiratory infection), and Yeast infection.    FHx:  family history includes Cancer in her paternal grandfather; Diabetes in her maternal grandmother, mother, paternal grandfather, and paternal grandmother; Heart Disease in her maternal grandmother; Hypertension in her father.    SHx:   reports that she has never smoked. She has never used smokeless tobacco. She reports that she does not drink alcohol and does not use drugs.    Medications:    Current Facility-Administered Medications:     magnesium sulfate IVPB premix 2 g, 2 g, Intravenous, Once, Lili Epps M.D., Last Rate: 25 mL/hr at 07/09/24 0810, 2 g at 07/09/24 0810    sodium chloride (Salt) tablet 3 g, 3 g, Oral, Q8HRS, Lili Epps M.D.    haloperidol lactate (Haldol) injection 1 mg, 1 mg, Intravenous, Q4HRS PRN, Pete Dillard M.D., 1 mg at 07/08/24 0233    dexmedetomidine (Precedex) 400 mcg/100mL infusion, 0.1-1.5 mcg/kg/hr (Order-Specific), Intravenous, Continuous, Pete Dillard M.D., Stopped at 07/08/24  1416    simethicone (Mylicon) chewable tablet 125 mg, 125 mg, Oral, TID PRN, Lili Epps M.D., 125 mg at 07/08/24 2123    midodrine (Proamatine) tablet 5 mg, 5 mg, Oral, Q8HRS, Jeremy M Gonda, M.D., 5 mg at 07/08/24 1448    hydroxychloroquine (Plaquenil) tablet 400 mg, 400 mg, Oral, Once per day on Monday Tuesday Wednesday Thursday Friday, 400 mg at 07/09/24 0634 **AND** [START ON 7/13/2024] hydroxychloroquine (Plaquenil) tablet 200 mg, 200 mg, Oral, Once per day on Sunday Saturday, Lili Epps M.D.    3% sodium chloride (Hypertonic Saline) 500mL infusion, 0-60 mL/hr, Intravenous, Continuous, Jeremy M Gonda, M.D., Last Rate: 60 mL/hr at 07/09/24 0743, 60 mL/hr at 07/09/24 0743    ALPRAZolam (Xanax) tablet 0.5 mg, 0.5 mg, Oral, TID PRN, Jeremy M Gonda, M.D., 0.5 mg at 07/09/24 0332    MD Alert...ICU Electrolyte Replacement per Pharmacy, , Other, PHARMACY TO DOSE, Jeremy M Gonda, M.D.    labetalol (Normodyne/Trandate) injection 10 mg, 10 mg, Intravenous, Q10 MIN PRN, Jeremy M Gonda, M.D.    hydrALAZINE (Apresoline) injection 10 mg, 10 mg, Intravenous, Q2HRS PRN, Jeremy M Gonda, M.D.    acetaminophen (Tylenol) tablet 650 mg, 650 mg, Oral, Q6HRS PRN, Jeremy M Gonda, M.D., 650 mg at 07/08/24 1751    ondansetron (Zofran) syringe/vial injection 4 mg, 4 mg, Intravenous, Q4HRS PRN, Jeremy M Gonda, M.D., 4 mg at 07/07/24 0336    ondansetron (Zofran ODT) dispertab 4 mg, 4 mg, Oral, Q4HRS PRN, Jeremy M Gonda, M.D.    promethazine (Phenergan) tablet 12.5-25 mg, 12.5-25 mg, Oral, Q4HRS PRN, Jeremy M Gonda, M.D.    promethazine (Phenergan) suppository 12.5-25 mg, 12.5-25 mg, Rectal, Q4HRS PRN, Jeremy M Gonda, M.D.    prochlorperazine (Compazine) injection 5-10 mg, 5-10 mg, Intravenous, Q4HRS PRN, Jeremy M Gonda, M.D., 5 mg at 07/05/24 1322    amitriptyline (Elavil) tablet 100 mg, 100 mg, Oral, QHS PRN, Jeremy M Gonda, M.D., 100 mg at 07/07/24 2232    docusate sodium (Colace) capsule 100 mg, 100 mg, Oral, BID, Cheng LARA  Gonda, M.D., 100 mg at 07/09/24 0633    folic acid (Folvite) tablet 2 mg, 2 mg, Oral, DAILY, Jeremy M Gonda, M.D., 2 mg at 07/09/24 0633    gabapentin (Neurontin) capsule 300 mg, 300 mg, Oral, BID, Jeremy M Gonda, M.D., 300 mg at 07/09/24 0633    norepinephrine (Levophed) 8 mg in 250 mL NS infusion (premix), 0-1 mcg/kg/min, Intravenous, Continuous, Jeremy M Gonda, M.D., Last Rate: 3.1 mL/hr at 07/08/24 0524, 0.02 mcg/kg/min at 07/08/24 0524    rosuvastatin (Crestor) tablet 5 mg, 5 mg, Oral, Q EVENING, Jeremy M Gonda, M.D., 5 mg at 07/08/24 1751    enoxaparin (Lovenox) inj 80 mg, 1 mg/kg, Subcutaneous, Q12HRS, Jeremy M Gonda, M.D., 80 mg at 07/09/24 0633    aspirin EC tablet 81 mg, 81 mg, Oral, DAILY, Jeremy M Gonda, M.D., 81 mg at 07/09/24 0633    HYDROcodone-acetaminophen (Norco) 5-325 MG per tablet 1 Tablet, 1 Tablet, Oral, Q8HRS PRN, Jeremy M Gonda, M.D., 1 Tablet at 07/09/24 0332    Physical Examination:     Vitals:    07/09/24 0432 07/09/24 0500 07/09/24 0532 07/09/24 0600   BP:  (!) 158/67  (!) 141/65   Pulse:  86  84   Resp:  15  17   Temp:       TempSrc:       SpO2: 92% 94% 95% 96%   Weight:       Height:               NEUROLOGICAL EXAM:     Patient is awake and alert.  She follows commands.  Able to name objects.  However patient appears to have baseline mild MR.  Pupils equal reactive.  No gaze preference.  Left facial weakness.  Blinks to threat to both sides.  Extraocular muscles appear to be intact.  Moves the right arm and leg spontaneously antigravity.  Left lower extremity is also antigravity but has a drift.  Minimal movement in the left upper extremity.  No antigravity movement.  Response to tactile touch in the right side.  Less so on the left.  Possible neglect to the left.      Objective Data:    Labs:  Lab Results   Component Value Date/Time    PROTHROMBTM 13.9 07/05/2024 09:50 AM    INR 1.06 07/05/2024 09:50 AM      Lab Results   Component Value Date/Time    WBC 7.5 07/09/2024 05:03 AM    RBC  3.00 (L) 07/09/2024 05:03 AM    HEMOGLOBIN 9.9 (L) 07/09/2024 05:03 AM    HEMATOCRIT 29.7 (L) 07/09/2024 05:03 AM    MCV 99.0 (H) 07/09/2024 05:03 AM    MCH 33.0 07/09/2024 05:03 AM    MCHC 33.3 07/09/2024 05:03 AM    MPV 9.3 07/09/2024 05:03 AM    NEUTSPOLYS 67.10 07/09/2024 05:03 AM    LYMPHOCYTES 20.70 (L) 07/09/2024 05:03 AM    MONOCYTES 6.50 07/09/2024 05:03 AM    EOSINOPHILS 4.80 07/09/2024 05:03 AM    BASOPHILS 0.50 07/09/2024 05:03 AM      Lab Results   Component Value Date/Time    SODIUM 143 07/09/2024 06:43 AM    POTASSIUM 3.6 07/09/2024 05:03 AM    CHLORIDE 112 07/09/2024 05:03 AM    CO2 20 07/09/2024 05:03 AM    GLUCOSE 123 (H) 07/09/2024 05:03 AM    BUN 2 (L) 07/09/2024 05:03 AM    CREATININE 0.36 (L) 07/09/2024 05:03 AM    BUNCREATRAT 18 05/26/2017 07:09 AM    GLOMRATE 95 10/31/2023 10:46 AM      Lab Results   Component Value Date/Time    CHOLSTRLTOT 194 07/06/2024 04:40 AM    LDL 81 07/06/2024 04:40 AM    HDL 70 07/06/2024 04:40 AM    TRIGLYCERIDE 217 (H) 07/06/2024 04:40 AM       Lab Results   Component Value Date/Time    ALKPHOSPHAT 70 07/05/2024 04:56 AM    ASTSGOT 25 07/05/2024 04:56 AM    ALTSGPT 16 07/05/2024 04:56 AM    TBILIRUBIN 0.3 07/05/2024 04:56 AM        Imaging/Testing:  CT-HEAD W/O   Final Result         1. Limited by motion.   2. No definite new abnormality.               DX-CHEST-LIMITED (1 VIEW)   Final Result         New right IJ central venous catheter is in good position without a pneumothorax.      CT-HEAD W/O   Final Result      Diffuse low-attenuation of the right hemisphere is consistent with a MCA territory infarct. Small areas of increased density within this region may represent petechial hemorrhages. There is effacement of the right lateral ventricle with approximately 3    mm of midline shift.               CT-CTA PELVIS WITH & W/O-POST PROCESS   Final Result      1.  No CTA evidence of active extravasation of contrast to suggest active bleeding.   2.  There is a  subcutaneous hematoma in the right groin adjacent to the common femoral vessels.   3.  Contrast in the urinary bladder from the angiographic procedure earlier today.      EC-ECHOCARDIOGRAM COMPLETE W/O CONT   Final Result      MR-BRAIN-W/O   Final Result         Acute infarct in the right frontoparietal region and right insula predominantly involving the cortex. Acute infarct also noted in the right caudate and putamen.      Minimal subarachnoid hemorrhage noted in the sylvian fissure and the sulci over the convexity, likely procedure related.      No midline shift or significant mass effect.      IR-THROMBO MECHANICAL ARTERY,INIT   Final Result         33-year-old who presented with left-sided symptoms was found to have a right MCA M3 occlusion with a perfusion defect identified on CT perfusion imaging. Patient underwent emergent mechanical thrombectomy with multiple attempts to recanalize the parietal    M3 branch being unsuccessful. Final angiographic images demonstrate good antegrade flow in the MCA branches other than the occluded right MCA M3 parietal branch.      Short segment dissection of the right ICA just below the skull base was identified on the final angiogram.  Findings discussed with Dr. Fernandez and given the nonprogressive nature of the dissection without flow limitation, the dissection will be managed by    initiation of antithrombotic/anticoagulation therapy.      Final recanalization score: TICI 2 C      I, Virgilioorlandoelena Romanluna was physically present and participated during the entire procedure of the IR-THROMBO MECHANICAL ARTERY,INIT.                  DX-CHEST-PORTABLE (1 VIEW)   Final Result      Mild interstitial prominence could be related to hypoinflation. No consolidation or pleural effusion.      CT-CTA NECK WITH & W/O-POST PROCESSING   Final Result      CT angiogram of the neck within normal limits.      CT-CTA HEAD WITH & W/O-POST PROCESS   Final Result      Occlusion of the superior sylvian  "branch M2 segment RIGHT middle cerebral artery.      These findings were discussed with GAIL AGUIRRE II on 7/5/2024 5:33 AM.            CT-CEREBRAL PERFUSION ANALYSIS   Final Result      1. Cerebral blood flow less than 30% possibly representing completed infarct = 5 mL. Based on distribution of this finding, this is unlikely to represent artifact.      2. T Max more than 6 seconds possibly representing combination of completed infarct and ischemia = 28 mL. Based on the distribution of this finding, this is unlikely to represent artifact.      3. Mismatched volume possibly representing ischemic brain/penumbra= 23 mL      4.  Please note that this cerebral perfusion study and report is Quantitative and targets supratentorial (cerebral) perfusion for evaluation of large vessel territory acute ischemia/infarction. For example, lacunar infarcts, and brainstem/posterior fossa    ischemia/infarction are not evaluated on this study.  Data acquisition is subject to artifacts which can yield non-anatomically plausible perfusion maps which may be due to motion, bolus timing, signal to noise ratio, or other technical factors.    Perfusion map abnormalities which show non-anatomic distributions are likely artifact.   This study is not \"stand-alone\" and should only be utilized for diagnosis, management/treatment in correlation with CT, CTA, and/or MRI and clinical factors.               Assessment and Plan:    33-year-old female on multiple immune modulating medications for on identified underlying autoimmune disorder.  Possible underlying undiagnosed MRI.  Presenting with a right M2 occlusion.  Status post thrombectomy attempt which failed with TICI score 0.  Also complicated by carotid dissection.  Currently on Lovenox.  MRI brain confirmed right-sided MCA territory infarct.  Patient's been placed on hypertonic therapy for concern for malignant edema.  Post stroke day #3.  For now we will continue with hypertonic " therapy.  Blood pressure has been very  labile for the past day and a half.  Ranging from the low 100s up to 190 systolic.  Recommend keeping a tighter control of this.  Will modified blood pressure parameters slightly to help avoid pressors or IV medications.  Will change and ranged from 130 systolic down to 120 systolic.  Up to 160 systolic.    Update 7/9  Post stroke day #4.  Will liberalize blood pressure parameters further continue to monitor for any type of deterioration exam.  Continue hypertonic therapy for additional day followed by slow wean off.    Plan:  1.  Change blood pressure parameters to 110s to 160s systolic.    2.  Continue with anticoagulation with Lovenox secondary to ICA dissection.  3.  We recommend repeat echo stiffer study with per report was a difficult study.  Recommend repeating with contrast.  4.  Continue telemonitoring  5.  Okay to continue home Crestor for an LDL goal below 70.  6.  Continue with aspirin.  7.  Continue with PT and OT speech therapy.        This chart was partially generated using voice recognition technology and sound alike word replacement may be present, best efforts were made to make the chart accurate.    Preet Jacob MD  Board Certified Neurology, ABPN  (t) [phone]

## 2024-07-09 NOTE — DISCHARGE PLANNING
Call out to mother Brandi.  Discussed admission and family training.  Brandi agreeable to both. Tyler lives with her mom Brandi in [place] in a 2 story home with 3 SUSY.  Tyler's current bedroom is on the second level, but Brandi is in the process of moving her to a bedroom downstairs.  Discussed possible LOS, clothing needs, visiting hours.  Brandi will provide 24/7 care since she does not work.  All questions answered.  [facility] contact information given.

## 2024-07-09 NOTE — DISCHARGE PLANNING
CM reviewed chart and patient was discussed in IDT rounds.  Her night was uneventful.    PT will be working with her today.    Inpatient Rehab is following.

## 2024-07-09 NOTE — THERAPY
Physical Therapy   Daily Treatment     Patient Name: Ellie Sánchez  Age:  33 y.o., Sex:  female  Medical Record #: [id number]  Today's Date: 7/9/2024     Precautions  Precautions: Fall Risk;Swallow Precautions  Comments: L deficits    Assessment    Patient seen for PT treatment session. Patient in bed, agreeable for the session. Able to participate with bed mobility, EOB sitting-balance, posture, tolerance, sit-stand reps as detailed below. Will continue to benefit from PT services and recommend post-acute placement at this time.      Plan    Treatment Plan Status: Continue Current Treatment Plan  Type of Treatment: Bed Mobility, Equipment, Family / Caregiver Training, Neuro Re-Education / Balance, Therapeutic Activities, Therapeutic Exercise  Treatment Frequency: 5 Times per Week  Treatment Duration: Until Therapy Goals Met    DC Equipment Recommendations: Unable to determine at this time  Discharge Recommendations: Recommend post-acute placement for additional physical therapy services prior to discharge home    Objective     07/09/24 1025   Precautions   Precautions Fall Risk;Swallow Precautions;Other (See Comments)   Comments SBP goal: 120-160; L side deficits   Vitals   Pulse 89   Patient BP Position Caldwell's Position   Blood Pressure (!) 140/63  (Taken in R lower leg)   Pulse Oximetry 97 %   O2 Delivery Device None - Room Air   Pain   Pain Scales 0 to 10 Scale    Intervention Medication (see MAR);Repositioned;Rest   Pain 0 - 10 Group   Location Shoulder   Location Orientation Left   Therapist Pain Assessment Prior to Activity;During Activity;Post Activity;Post Activity Pain Same as Prior to Activity   Cognition    Cognition / Consciousness X   Speech/ Communication Delayed Responses   Level of Consciousness Alert   Ability To Follow Commands 1 Step   Safety Awareness Impaired   Attention Impaired   Sequencing Impaired   Comments Fluctuating level of alertness   Passive ROM Lower Body   Passive ROM Lower  Body WDL   Active ROM Lower Body    Active ROM Lower Body  X   Comments LLE-Patient able to initiate slight movement in her LLE when asked to perform straight leg raise.   Strength Lower Body   Lower Body Strength  X   Comments LLE-Quadriceps activation with knee extension in supine   Neuro-Muscular Treatments   Neuro-Muscular Treatments Anterior weight shift;Joint Approximation;Postural Changes;Postural Facilitation;Verbal Cuing   Comments Seated EOB: Appropriate position of LUE with use of pillow, weight shift to facilitate upright trunk posture, facilitate appropriate use of RUE to avoid leaning/ pushing towards L, facilitate appropriate R foot placement on the step stool to assist with balance, appropriate L foot placement to facilitate proprioception   Vision   Vision Comments L side neglect; Increased difficulty to track towards L; eye teaming deficits; eye-head incoordination; Cues to facilitate tracking towards L.   Other Treatments   Other Treatments Provided Patient was educated and assisted with appropriate positioning of LUE with use of pillows towards end of session. Alerted OT regarding L shoulder pain and LUE edema.   Balance   Sitting Balance (Static) Poor   Sitting Balance (Dynamic) Trace +   Standing Balance (Static) Trace +   Standing Balance (Dynamic) Dependent   Weight Shift Sitting Poor   Weight Shift Standing Absent   Skilled Intervention Verbal Cuing;Tactile Cuing;Sequencing;Facilitation;Compensatory Strategies   Comments Seated EOB: Posterior lean (+); cues and assistance with balance; Standing with 2 person assist   Bed Mobility    Supine to Sit Maximal Assist   Sit to Supine Maximal Assist   Scooting Maximal Assist   Rolling Maximal Assist to Rt.   Skilled Intervention Verbal Cuing;Sequencing;Facilitation;Compensatory Strategies   Comments HOB flat, without use of rails, towards R side of the bed   Gait Analysis   Gait Level Of Assist Unable to Participate   Functional Mobility   Sit to  Stand Maximal Assist  (x2 person assist)   Bed, Chair, Wheelchair Transfer Unable to Participate   Mobility Bed-EOB-sitting balance-sitting posture-sit-stand reps (x2 times)-EOB-bed   Skilled Intervention Verbal Cuing;Sequencing;Facilitation;Compensatory Strategies;Tactile Cuing   Comments Use of step stool due to elevated height of the bed; Cues for hand placement, LE placement prior to sit-stand & stand-sit; cues for appropriate trunk posture upon standing; able to tolerate standing < 10s each time.   6 Clicks Assessment - How much HELP from from another person do you currently need... (If the patient hasn't done an activity recently, how much help from another person do you think he/she would need if he/she tried?)   Turning from your back to your side while in a flat bed without using bedrails? 2   Moving from lying on your back to sitting on the side of a flat bed without using bedrails? 2   Moving to and from a bed to a chair (including a wheelchair)? 1   Standing up from a chair using your arms (e.g., wheelchair, or bedside chair)? 2   Walking in hospital room? 1   Climbing 3-5 steps with a railing? 1   6 clicks Mobility Score 9   Patient / Family Goals    Patient / Family Goal #1 To get stronger and return home   Goal #1 Outcome Progressing slower than expected   Short Term Goals    Short Term Goal # 1 Patient will perform supine-sit, sit-supine with HOB raised with use of bed rail with CGA in 6 visits to progress with bed mobility   Goal Outcome # 1 Progressing slower than expected   Short Term Goal # 2 Patient will perform sit-stand with LRAD with Min A in 6 visits to progress with functional mobility   Goal Outcome # 2 Progressing slower than expected   Short Term Goal # 3 Patient will perform chair transfers with LRAD with Min A in 6 visits to progress with OOB to chair for meals   Goal Outcome # 3 Goal not met   Short Term Goal # 4 Patient will sit EOB with Fair (-) balance, with CGA, for about 20  minutes to improve upright sitting and sitting balance.   Goal Outcome # 4 Progressing slower than expected   Physical Therapy Treatment Plan   Physical Therapy Treatment Plan Continue Current Treatment Plan   Anticipated Discharge Equipment and Recommendations   DC Equipment Recommendations Unable to determine at this time   Discharge Recommendations Recommend post-acute placement for additional physical therapy services prior to discharge home   Interdisciplinary Plan of Care Collaboration   IDT Collaboration with  Nursing;Therapy Tech;Family / Caregiver   Patient Position at End of Therapy In Bed;Bed Alarm On;Call Light within Reach;Tray Table within Reach;Family / Friend in Room  (Cousin at bedside)   Session Information   Date / Session Number  7/9-2(2/5, 7/14)

## 2024-07-09 NOTE — THERAPY
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  IV TNK administered at 528, cerebral angiogram and mechanical thrombectomy -  final recanalization score TICI 2C.    PMHx anxiety, bronchitis, otitis media  No previous h/o SLP services at [facility]    CMHx Acute ischemic right MCA stroke    CXR   Mild interstitial prominence could be related to hypoinflation. No consolidation or pleural effusion.    CTA-Head   Occlusion of the superior sylvian branch M2 segment RIGHT middle cerebral artery.      General Information  Vitals  O2 (LPM): 0  O2 Delivery Device: None - Room Air  Level of Consciousness: Drowsy     Orientation: Self, , General place, Current month, Current year  Follows Directives: Yes      Prior Living Situation & Level of Function  Housing / Facility: [address] [place] [address]  Lives with - Patient's Self Care Capacity: Parents  Comments: Pt lives with mother and 10 yo nephew. Baseline she is independent with all ADLs including cooking, cleaning, grocery shopping, and driving. She no longer works.  Communication: WFL  Swallowing: WFL         Assessment  Pt seen on this date for a cognitive-linguistic evaluation. Portions of the Cognistat (The Neurobehavioral Cognitive Status Examination) were presented to the pt and they received the following scores:    Orientation: Supervision 9  Attention: WNL  Repetition (Language):WNL  Memory:Moderate - Severe 5  Similarities (Reasoning):Moderate  Judgment (Reasoning):Severe    Non-standardized measures were used to assess other cognitive domains and confrontational naming was found to be WFL. During calculation task, task was abandoned as pt endorsed that she was a learning disability which impacts math. Pt endorsed  "graduating from high school with with IEP and started college but did not complete. Pt's cousin and father at bedside endorsed that pt seems \"slower\" than baseline but has been making good cognitive gains since initial admit. Pt had difficulty maintaining appropriate level of wakefulness as session progressed so further tasks were abandoned. Pt endorsed being independent with IADLs at baseline.    Of note, cognitive-linguistic evaluations assess performance on various cognitive-linguistic domains such as expressive and receptive language, attention, memory, executive function, problem solving, etc. It is not within the scope of Speech Language Pathologists to determine capacity, please defer to medical team or psych for capacity evaluation. Thank you.           Clinical Impressions  Pt is presenting with acute changes in cognition s/p CVA. Recommend cognitive speech therapy targeting memory and reasoning but may also benefit from further assessment of reading and writing. Currently pt will benefit from direct supervision with IADLs upon d/c.      Recommendations  Supervision Needs Upons Discharge: Direct assistance with IADLs (see below)  IADLs: Medication management, Financial management, Appointment management, Household chores, Cooking         SLP Treatment Plan  Treatment Plan: Dysphagia Treatment, Cognitive Treatment  SLP Frequency: 4x Per Week  Estimated Duration: Until Therapy Goals Met      Anticipated Discharge Needs  Discharge Recommendations: Recommend post-acute placement for additional speech therapy services prior to discharge home  Therapy Recommendations Upon DC: Dysphagia Training, Cognitive-Linguistic Training, Community Re-Integration, Patient / Family / Caregiver Education      Patient / Family Goals  Patient / Family Goal #1: water, sprite, to go home  Goal #1 Outcome: Progressing as expected  Short Term Goal # 1: Patient will participate in an instrumental swallow study to determine swallow " physiology, aspiration risk and inform POC.  Goal Outcome # 1: Goal met, new goal added  Short Term Goal # 1 B : Patient will consume minced/moist solids, thin liquids with no s/sx of airway invasion or respiratory decline given min cues for safe swallow precautions.  Goal Outcome  # 1 B: Progressing as expected  Short Term Goal # 2: Patient will complete OMEX x20 reps per session given min cues.  Goal Outcome # 2 : Other (see comments) (not targeted this session)  Short Term Goal # 3: Pt will complete functional memory tasks with >70% accuracy and min clinician cues  Short Term Goal # 4: Pt will complete functional problem solving tasks with >70% accuracy and min clinician cues      Hiwot Mike, SLP

## 2024-07-09 NOTE — THERAPY
Speech Language Pathology   Daily Treatment     Patient Name: Ellie Sánchez  AGE:  33 y.o., SEX:  female  Medical Record #: [id number]  Date of Service: 7/9/2024      Precautions:  Precautions: Fall Risk, Swallow Precautions         Subjective  Pt asleep upon entry but woke with min cues. Supportive family at bedside. Pt tired from working with PT but agreeable to therapy.      Assessment  No overt s/sx of aspiration noted with trials of pt's minced and moist/thin liquid breakfast. Anterior loss of bolus noted with cup sip of thin liquids out of L side of mouth. Improved bolus control with use of straw in R side of mouth. Functional mastication of minced and moist with intermittent trace L oral residue. Trials of soft and bite size texture were presented to the pt and no overt s/sx of aspiration noted, however, pt with prolonged mastication. Bolus seen to protrude from mouth intermittently d/t impaired sensation although no anterior loss appreciated. She denied swallowing food whole. When asked preference, pt opting for continuation of minced and moist for ease. Pt required cues to reduce bite size through out session with fading cues.      Clinical Impressions  Pt continues to present with acute oral dysphagia given CVA. Recommend continuation of a minced and moist/thin liquid diet per pt preference.       Recommendations  Treatment Completed: Dysphagia Treatment       Dysphagia Treatment  Diet Consistency: Minced/moist solids, Thin liquids  Instrumentation: None indicated at this time  Medication: Whole with puree  Supervision: Direct supervision during meals  Positioning: Fully upright and midline during oral intake  Risk Management : Small bites/sips, Monitor for left pocketing                     SLP Treatment Plan  Treatment Plan: Dysphagia Treatment  SLP Frequency: 4x Per Week  Estimated Duration: Until Therapy Goals Met      Anticipated Discharge Needs  Discharge Recommendations: Recommend post-acute  placement for additional speech therapy services prior to discharge home  Therapy Recommendations Upon DC: Dysphagia Training, Community Re-Integration, Patient / Family / Caregiver Education      Patient / Family Goals  Patient / Family Goal #1: water, sprite, to go home  Goal #1 Outcome: Progressing as expected  Short Term Goals  Short Term Goal # 1: Patient will participate in an instrumental swallow study to determine swallow physiology, aspiration risk and inform POC.  Goal Outcome # 1: Goal met, new goal added  Short Term Goal # 1 B : Patient will consume minced/moist solids, thin liquids with no s/sx of airway invasion or respiratory decline given min cues for safe swallow precautions.  Goal Outcome  # 1 B: Progressing as expected  Short Term Goal # 2: Patient will complete OMEX x20 reps per session given min cues.  Goal Outcome # 2 : Other (see comments) (not targeted this session)  Short Term Goal # 3: Pt will complete functional memory tasks with >70% accuracy and min clinician cues  Short Term Goal # 4: Pt will complete functional problem solving tasks with >70% accuracy and min clinician cues      Hiwot Mike, SLP

## 2024-07-09 NOTE — PROGRESS NOTES
Monitor summary    Heart rate     Sinus bradycardia to sinus tachycardia   KS 0.12/QRS 0.08/QT 0.4.13

## 2024-07-09 NOTE — CARE PLAN
The patient is Watcher - Medium risk of patient condition declining or worsening    Shift Goals  Clinical Goals: Q2 Neuro, Q6 sodium  Patient Goals: Rest  Family Goals: CLAYTON    Progress made toward(s) clinical / shift goals:      Problem: Optimal Care of the Stroke Patient  Goal: Optimal emergency care for the stroke patient  Outcome: Progressing     Problem: Knowledge Deficit - Stroke Education  Goal: Patient's knowledge of stroke and risk factors will improve  Outcome: Progressing     Problem: Psychosocial - Patient Condition  Goal: Patient's ability to verbalize feelings about condition will improve  Outcome: Progressing     Problem: Neuro Status  Goal: Neuro status will remain stable or improve  Outcome: Progressing     Problem: Hemodynamic Monitoring  Goal: Patient's hemodynamics, fluid balance and neurologic status will be stable or improve  Outcome: Progressing     Problem: Self Care  Goal: Patient will have the ability to perform ADLs independently or with assistance (bathe, groom, dress, toilet and feed)  Outcome: Progressing     Problem: Pain - Standard  Goal: Alleviation of pain or a reduction in pain to the patient’s comfort goal  Outcome: Progressing       Patient is not progressing towards the following goals:

## 2024-07-09 NOTE — PREADMISSION SCREENING NOTE
Pre-Admission Screening Form    Patient Information:   Name: Ellie Sánchez     MRN: [id number]       : 1990      Age: 33 y.o.   Gender: female      Race: White [7]       Marital Status: Single [1]  Family Contact: Brandi Sánchez,Amy Jimenes,Jacklyn Fitzgerald,Ramonita Sánchez,Matti “Brandon”  Yuliya Jimenes        Relationship: Mother [8]  Relative [11]  Relative [11]  Relative [11]  Father [3]  Relative [11]  Home Phone: [phone]           Cell Phone: [phone]    Advanced Directives: None  Code Status:  FULL  Current Attending Provider: Lili Epps M.D.  Referring Physician: Dr. Gonda      Physiatrist Consult: Dr. Lovell       Referral Date: 2024  Primary Payor Source:  MEDICAID [id number]  Secondary Payor Source:      Medical Information:   Date of Admission to Acute Care [facility]  Room Number: R102/00  Rehabilitation Diagnosis: 0001.1 - Stroke: Left Body Involvement (Right Brain)  Immunization History   Administered Date(s) Administered    DTP - Historical vaccine 1989, 1989, 1989    HPV Quadrivalent Vaccine (GARDASIL) - HISTORICAL DATA 2013, 2013, 2014    Hepatitis B Vaccine Adolescent/Pediatric 1993, 2005    Influenza Vaccine Adult HD 2024    Influenza Vaccine Quad Inj (Pf) 2024    MMR Vaccine 1990, 2017    OPV TRIVALENT - HISTORICAL DATA (GIVEN PRIOR TO MAY 2016) 1989, 1989    Pneumococcal Conjugate Vaccine (PCV20) 2024    Tdap Vaccine 2014    Tuberculin Skin Test 10/07/2014, 10/19/2015, 2017    Varicella Vaccine Live 1997    Zoster Vaccine Recombinant (RZV) (SHINGRIX) 2024, 2024     Allergies   Allergen Reactions    Pcn [Penicillins] Rash    Augmentin Diarrhea     diarrhea    Zithromax [Azithromycin] Rash     Rash     Past Medical History:   Diagnosis Date    Abdominal  pain, left upper quadrant     Anxiety     Bronchitis     Chest pain     Cough     Depression     Early satiety     Gastritis     Helicobacter pylori (H. pylori)     Jaw pain     Migraine     Musculoskeletal pain     Back    Otitis media     Right wrist pain     Sinus disorder     Sinusitis     SOB (shortness of breath)     Thrombocytosis     Tonsillitis     URI (upper respiratory infection)     Yeast infection      Past Surgical History:   Procedure Laterality Date    ABDOMINAL EXPLORATION         History Leading to Admission, Conditions that Caused the Need for Rehab (CMS): Large R MCR stroke,spasticity, HTN, Neuropathic pain, lethargy  Co-morbidities:  as listed above and below  Potential Risk - Complications: Aphasia, Cognitive Impairment, Contractures, Deep Vein Thrombosis, Dysphagia, Incontinence, Malnutrition, Pain, Paralysis, and Perceptual Impairment  Level of Risk: High    Ongoing Medical Management Needed (Medical/Nursing Needs):   Patient Active Problem List    Diagnosis Date Noted    Hypokalemia 07/06/2024    Hyperglycemia 07/06/2024    Anemia 07/06/2024    Acute ischemic right MCA stroke (HCC) 07/05/2024    Hypomagnesemia 07/05/2024    Leukocytosis 10/04/2023    Thrombocytosis 02/17/2021    SLE (systemic lupus erythematosus related syndrome) (HCC) 02/17/2021    Mixed dyslipidemia 08/23/2017    Rosacea 03/30/2017    Acne 01/07/2015    GERD (gastroesophageal reflux disease) 07/08/2014    Migraines 07/08/2014    Abdominal pain 01/03/2013    Anxiety 07/16/2012    Depression 07/16/2012           Hematology & Oncology Consultation     Date of Service  7/7/2024     Referring Physician  Jeremy M Gonda, M.D.     Consulting Physician  Paul Patton M.D.     Reason for Consultation  CVA     History of Presenting Illness - limited by patient's mental status, supplemented by review of EMR, discussion with RN and mother at bedside/  33 y.o. female who presented 7/5/2024 with unilateral weakness. Patient had return  to baseline while at OSH and there was no thrombolysis. There was attempted thrombectomy, but it was not successful. Presently she's off the floor for CT. Patient had presented to her PCP complaining of chest pain and was advised to f/u with psychotherapist for anxiety.      Review of Systems  Review of Systems   Unable to perform ROS: Mental status change         Past Medical History   has a past medical history of Abdominal pain, left upper quadrant, Anxiety, Bronchitis, Chest pain, Cough, Depression, Early satiety, Gastritis, Helicobacter pylori (H. pylori), Jaw pain, Migraine, Musculoskeletal pain, Otitis media, Right wrist pain, Sinus disorder, Sinusitis, SOB (shortness of breath), Thrombocytosis, Tonsillitis, URI (upper respiratory infection), and Yeast infection.     Surgical History   has a past surgical history that includes abdominal exploration.     Family History  family history includes Cancer in her paternal grandfather; Diabetes in her maternal grandmother, mother, paternal grandfather, and paternal grandmother; Heart Disease in her maternal grandmother; Hypertension in her father.     Social History   reports that she has never smoked. She has never used smokeless tobacco. She reports that she does not drink alcohol and does not use drugs.     Medications  Prior to Admission Medications  Prescriptions Last Dose Informant Patient Reported? Taking?  HYDROcodone-acetaminophen (NORCO) 5-325 MG Tab per tablet unknown at unknown Family Member, Rx Bottle (For Med Information) No No  Sig: Take 1 Tablet by mouth 1 time a day as needed (joint pain and body aches) for up to 30 days.  HYDROcodone-acetaminophen (NORCO) 5-325 MG Tab per tablet new script at n/a Family Member, Rx Bottle (For Med Information) No No  Si pill by mouth once in a day with food as needed for severe joint pain and bodyaches.  Rx for 30 days.  Do not fill till 2024  HYDROcodone-acetaminophen (NORCO) 5-325 MG Tab per tablet new  script at n/a Family Member, Rx Bottle (For Med Information) No No  Si pill by mouth once a day as needed for severe joint pain and bodyaches.  Rx for 30 days.  Do not fill till 2024.  LORazepam (ATIVAN) 1 MG Tab unknown at unknown Family Member, Rx Bottle (For Med Information) No No  Sig: Take 1 Tablet by mouth 1 time a day as needed for Anxiety (anxiousness) for up to 90 days. Do not fill till 24  Upadacitinib ER (RINVOQ) 15 MG TABLET SR 24 HR unknown at unknown Family Member, Rx Bottle (For Med Information) No No  Sig: Take 15 mg by mouth every day at 6 PM.  acetaminophen/caffeine/butalbital 300-40-50 mg (FIORICET) -40 MG Cap capsule unknown at unknown Family Member, Rx Bottle (For Med Information) No No  Sig: Take 1 Capsule by mouth 1 time a day as needed for Headache for up to 30 days.  amitriptyline (ELAVIL) 100 MG Tab unknown at unknown Family Member, Rx Bottle (For Med Information) No No  Sig: TAKE ONE TABLET BY MOUTH AT BEDTIME AS NEEDED  Patient taking differently: sleep  docusate sodium (COLACE) 100 MG Cap unknown at unknown Family Member, Rx Bottle (For Med Information) Yes Yes  Sig: Take 100 mg by mouth 2 times a day.  folic acid (FOLVITE) 1 MG Tab unknown at unknown Family Member, Rx Bottle (For Med Information) No No  Sig: TAKE TWO TABLETS BY MOUTH DAILY  gabapentin (NEURONTIN) 300 MG Cap unknown at unknown Family Member, Rx Bottle (For Med Information) No No  Sig: Take 1 Capsule by mouth 2 times a day.  hydroxychloroquine (PLAQUENIL) 200 MG Tab unknown at unknown Family Member, Rx Bottle (For Med Information) No No  Sig: TAKE TWO TABLETS BY MOUTH MONDAY-FRIDAY. TAKE ONE TABLET BY MOUTH DAILY ON SATURDAY AND   methotrexate 2.5 MG tablet 2024 at unknown Family Member, Rx Bottle (For Med Information) No No  Sig: Take 8 Tablets by mouth every 7 days.  metoprolol SR (TOPROL XL) 25 MG TABLET SR 24 HR unknown at unknown Family Member, Rx Bottle (For Med Information) Yes No  Sig:  25 mg every day.  metronidazole (METROCREAM) 0.75 % cream unknown at unknown Family Member, Rx Bottle (For Med Information) No No  Sig: Apply 1 Application topically 2 times a day.  naproxen (NAPROSYN) 500 MG Tab unknown at unknown Family Member, Rx Bottle (For Med Information) No No  Sig: Take 1 Tablet by mouth 2 times daily with meals as needed (moderate pain).  phentermine 15 MG capsule unknown at unknown Family Member, Rx Bottle (For Med Information) No No  Sig: Take 1 Capsule by mouth every morning for 60 days.  rosuvastatin (CRESTOR) 5 MG Tab unknown at unknown Family Member, Rx Bottle (For Med Information) No No  Sig: TAKE 1/2 TO 1 TABLET BY MOUTH EVERY EVENING AS DIRECTED  Patient taking differently: 2.5-5 mg every evening. TAKE 1/2 TO 1 TABLET BY MOUTH EVERY EVENING AS DIRECTED     Alternating days    Facility-Administered Medications: None        Allergies  Allergies  Allergen Reactions   Pcn [Penicillins] Rash   Augmentin Diarrhea      diarrhea   Zithromax [Azithromycin] Rash      Rash        Physical Exam  Temp:  [36.4 °C (97.5 °F)-37.4 °C (99.4 °F)] 37.2 °C (99 °F)  Pulse:  [] 90  Resp:  [6-140] 35  BP: (101-211)/() 130/59  SpO2:  [84 %-97 %] 91 %     Physical Exam  Vitals and nursing note reviewed.   Constitutional:       Appearance: She is obese.   HENT:      Head: Normocephalic and atraumatic.      Right Ear: External ear normal.      Left Ear: External ear normal.      Nose: Nose normal.      Mouth/Throat:      Mouth: Mucous membranes are moist.      Pharynx: Oropharynx is clear.   Cardiovascular:      Rate and Rhythm: Normal rate and regular rhythm.      Pulses: Normal pulses.      Heart sounds: Normal heart sounds.   Pulmonary:      Effort: Pulmonary effort is normal.      Breath sounds: Normal breath sounds.   Abdominal:      General: Abdomen is flat. Bowel sounds are normal.      Palpations: Abdomen is soft.   Musculoskeletal:         General: Normal range of motion.      Cervical  back: Normal range of motion and neck supple.   Skin:     Findings: Rash present.         Not compliant with exam           Fluids  Date 07/07/24 0700 - 07/08/24 0659  Shift 4043-7810 7764-4690 2462-6736 24 Hour Total  INTAKE  P.O. 60     60  I.V. 343.7     343.7  IV Piggyback 138.1     138.1  Shift Total 541.9     541.9  OUTPUT  Urine 1025     1025  Shift Total 1025     1025  Weight (kg) 82.5 82.5 82.5 82.5        Laboratory  Recent Labs    07/05/24  0533 07/06/24  0440 07/07/24  0219  WBC 12.3* 14.6* 15.3*  RBC 4.02* 3.64* 3.23*  HEMOGLOBIN 12.9 11.8* 10.6*  HEMATOCRIT 37.9 34.5* 31.4*  MCV 94.3 94.8 97.2  MCH 32.1 32.4 32.8  MCHC 34.0 34.2 33.8  RDW 46.9 48.1 48.7  PLATELETCT 526* 566* 502*  MPV 9.0 9.4 9.3     Recent Labs    07/05/24  0456 07/06/24  0440 07/06/24  1820 07/07/24  0219 07/07/24  0815 07/07/24  1420  SODIUM 139 143   < > 144 142 141  POTASSIUM 4.1 3.7  --  3.9  --   --   CHLORIDE 107 107  --  108  --   --   CO2 19* 15*  --  23  --   --   GLUCOSE 112* 142*  --  134*  --   --   BUN 8 4*  --  3*  --   --   CREATININE 0.59 0.60  --  0.56  --   --   CALCIUM 8.8 8.8  --  8.4*  --   --    < > = values in this interval not displayed.     Recent Labs    07/05/24  0930 07/05/24  0950  APTT 21.7* <20.0*  INR 1.00 1.06         Recent Labs    07/06/24  0440  TRIGLYCERIDE 217*  HDL 70  LDL 81        Imaging  DX-CHEST-LIMITED (1 VIEW)  Final Result        New right IJ central venous catheter is in good position without a pneumothorax.     CT-HEAD W/O  Final Result     Diffuse low-attenuation of the right hemisphere is consistent with a MCA territory infarct. Small areas of increased density within this region may represent petechial hemorrhages. There is effacement of the right lateral ventricle with approximately 3   mm of midline shift.              CT-CTA PELVIS WITH & W/O-POST PROCESS  Final Result     1.  No CTA evidence of active extravasation of contrast to suggest active bleeding.  2.  There is a  subcutaneous hematoma in the right groin adjacent to the common femoral vessels.  3.  Contrast in the urinary bladder from the angiographic procedure earlier today.     EC-ECHOCARDIOGRAM COMPLETE W/O CONT  Final Result     MR-BRAIN-W/O  Final Result        Acute infarct in the right frontoparietal region and right insula predominantly involving the cortex. Acute infarct also noted in the right caudate and putamen.     Minimal subarachnoid hemorrhage noted in the sylvian fissure and the sulci over the convexity, likely procedure related.     No midline shift or significant mass effect.     IR-THROMBO MECHANICAL ARTERY,INIT  Final Result        33-year-old who presented with left-sided symptoms was found to have a right MCA M3 occlusion with a perfusion defect identified on CT perfusion imaging. Patient underwent emergent mechanical thrombectomy with multiple attempts to recanalize the parietal   M3 branch being unsuccessful. Final angiographic images demonstrate good antegrade flow in the MCA branches other than the occluded right MCA M3 parietal branch.     Short segment dissection of the right ICA just below the skull base was identified on the final angiogram.  Findings discussed with Dr. Fernandez and given the nonprogressive nature of the dissection without flow limitation, the dissection will be managed by   initiation of antithrombotic/anticoagulation therapy.     Final recanalization score: TICI 2 C     I, Virgilioorlandoelena Kristalnoéluna was physically present and participated during the entire procedure of the IR-THROMBO MECHANICAL ARTERY,INIT.                 DX-CHEST-PORTABLE (1 VIEW)  Final Result     Mild interstitial prominence could be related to hypoinflation. No consolidation or pleural effusion.     CT-CTA NECK WITH & W/O-POST PROCESSING  Final Result     CT angiogram of the neck within normal limits.     CT-CTA HEAD WITH & W/O-POST PROCESS  Final Result     Occlusion of the superior sylvian branch M2 segment RIGHT  "middle cerebral artery.     These findings were discussed with GAIL AGUIRRE II on 7/5/2024 5:33 AM.           CT-CEREBRAL PERFUSION ANALYSIS  Final Result     1. Cerebral blood flow less than 30% possibly representing completed infarct = 5 mL. Based on distribution of this finding, this is unlikely to represent artifact.     2. T Max more than 6 seconds possibly representing combination of completed infarct and ischemia = 28 mL. Based on the distribution of this finding, this is unlikely to represent artifact.     3. Mismatched volume possibly representing ischemic brain/penumbra= 23 mL     4.  Please note that this cerebral perfusion study and report is Quantitative and targets supratentorial (cerebral) perfusion for evaluation of large vessel territory acute ischemia/infarction. For example, lacunar infarcts, and brainstem/posterior fossa   ischemia/infarction are not evaluated on this study.  Data acquisition is subject to artifacts which can yield non-anatomically plausible perfusion maps which may be due to motion, bolus timing, signal to noise ratio, or other technical factors.   Perfusion map abnormalities which show non-anatomic distributions are likely artifact.   This study is not \"stand-alone\" and should only be utilized for diagnosis, management/treatment in correlation with CT, CTA, and/or MRI and clinical factors.        IR-PICC LINE PLACEMENT W/ GUIDANCE > AGE 5    (Results Pending)  CT-HEAD W/O    (Results Pending)        Assessment/Plan  CVA - continue lovenox for now and transition to NOAC  Anemia - hospital acquired, likely secondary to phlebotomy, monitor for bleeding, f/u iron, B12 and folate     Case discussed with patient, mother at bedside and RN     Thank you very much for the consult, please call with any questions.     Paul Patton M.D          [facility] Neurosurgery  Referring MD:  Dr. Gonda  Reason for referral:  right MCA stroke     Author: Charly Razo M.D. Date & Time " created: 2024  2:35 PM     Interval History            33 year old female admitted yesterday early am for right MCA thrombotic stroke.  She takes methotrexate, hydroxychloroquine, and Rinvoq for, per chart review, is undefined autoimmune disorder.  TNK was administered IV; TICI 2C was obtained in IR with right distal ICA dissection, non flow limiting.       ROS per h/p     Physical Exam  Awake, alert interactive  Speech fluent appropriate  Pupils midline conjugate  Right  bicep tricep IP DF PF 5/5 left 0/5  Sensation intact touch face, 4 extremities  No Morales's     Patient Active Problem List    Diagnosis Date Noted   Hypokalemia 2024   Hyperglycemia 2024   Anemia 2024   Acute ischemic right MCA stroke (HCC) 2024   Hypomagnesemia 2024   Leukocytosis 10/04/2023   Thrombocytosis 2021   SLE (systemic lupus erythematosus related syndrome) ([facility]) 2021   Mixed dyslipidemia 2017   Rosacea 2017   Acne 2015   GERD (gastroesophageal reflux disease) 2014   Migraines 2014   Abdominal pain 2013   Anxiety 2012   Depression 2012        Temp (24hrs), Av.8 °C (98.3 °F), Min:35.8 °C (96.5 °F), Max:37.3 °C (99.2 °F)     Pulse: 70, Respiration: (!) 21, Blood Pressure: (!) 159/72, Pulse Oximetry: 95 %, O2 (LPM): 0         Intake/Output Summary (Last 24 hours) at 2024 1435  Last data filed at 2024 0600  Gross per 24 hour  Intake 950.99 ml  Output 725 ml  Net 225.99 ml        Urethral Catheter (Active)  Site Assessment Clean;Skin intact 24  Collection Container Standard drainage bag 24  Urinary Catheter Care Tamper Evident Seal in Place;Drainage Tube Extended;Drainage Bag Not Overfilled;Drainage Tubing Properly Secured;Drainage Bag Below Bladder Level and Not on Floor 24  Securement Method Securing device (Describe) 24  Output (mL) 70 mL 24         baclofen  5 mg     3%  sodium chloride  250 mL Stopped (07/06/24 1355)   3% sodium chloride  250 mL     labetalol  10 mg     hydrALAZINE  10 mg     acetaminophen  650 mg     ondansetron  4 mg     ondansetron  4 mg     promethazine  12.5-25 mg     promethazine  12.5-25 mg     prochlorperazine  5-10 mg     LORazepam  0.5-1 mg     amitriptyline  100 mg     docusate sodium  100 mg     folic acid  2 mg     gabapentin  300 mg     NORepinephrine  0-1 mcg/kg/min 0.14 mcg/kg/min (07/06/24 1252)   rosuvastatin  5 mg     enoxaparin (LOVENOX) injection  1 mg/kg     aspirin  81 mg     HYDROcodone-acetaminophen  1 Tablet     LR   100 mL/hr at 07/06/24 1026        Recent Labs    07/05/24  0140 07/05/24  0533 07/06/24  0440  WBC 13.4* 12.3* 14.6*  RBC 4.28 4.02* 3.64*  HEMOGLOBIN 13.5 12.9 11.8*  HEMATOCRIT 40.4 37.9 34.5*  MCV 94.4 94.3 94.8  MCH 31.5* 32.1 32.4  PLATELETCT 585* 526* 566*     Recent Labs    07/05/24  0140 07/05/24  0456 07/06/24  0440  SODIUM 142 139 143  POTASSIUM 3.9 4.1 3.7  CHLORIDE 104 107 107  CO2 25 19* 15*  GLUCOSE 121* 112* 142*  BUN 8 8 4*  CREATININE 0.9 0.59 0.60  CALCIUM 8.9 8.8 8.8     Recent Labs    07/05/24  0930 07/05/24  0950  APTT 21.7* <20.0*  INR 1.00 1.06     7/6/2024 10:52 AM     HISTORY/REASON FOR EXAM:  Stroke, follow up.        TECHNIQUE/EXAM DESCRIPTION AND NUMBER OF VIEWS:  CT of the head without contrast.     The study was performed on a helical multidetector CT scanner. Contiguous axial sections were obtained from the skull base through the vertex.     Up to date radiation dose reduction adjustments have been utilized to meet ALARA standards for radiation dose reduction.     COMPARISON:  7/5/2024     FINDINGS:  There is no fracture or abnormal extra-axial fluid collection. There is extensive low attenuation involving the right hemisphere including the basal ganglia. This is consistent with a right MCA territory infarct. There are several small foci of increased   attenuation in this region suggesting  petechial hemorrhages. There is edema on the right with approximately 3 mm of right-to-left shift of midline. There is effacement of the right lateral ventricle.     There is right maxillary sinus mucosal thickening.     Mastoids in the field of view are unremarkable.        IMPRESSION:     Diffuse low-attenuation of the right hemisphere is consistent with a MCA territory infarct. Small areas of increased density within this region may represent petechial hemorrhages. There is effacement of the right lateral ventricle with approximately 3   mm of midline shift.     AP:  33 year old female with RMCA stroke, left hemiparesis.  She is following commands, awake.  Continue observation  Hypertonic saline per protocol  ICU observation  Keep well hydrated  Recommend Rheumatology evaluation for autoimmune disorder          Consult Orders  IP Consult For Physiatry [[id number]] ordered by Jeremy M Gonda, M.D. at 07/06/24 0830       Expand All Collapse All                                                    Physical Medicine and Rehabilitation Consultation                                                                                  Date of initial consultation: 7/6/2024  Consulting provider: Jeremy M Gonda, M.D.   Reason for consultation: assess for acute inpatient rehab appropriateness  LOS: 1 Day(s)     Chief complaint: Right MCA syndrome     HPI: The patient is a 33 y.o. 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  Patient was taken to [facility], symptoms resolved, she underwent CT head which was normal.  CT angiogram showed a right M3 occlusion.  No thrombolytic therapy was given due to return to baseline.  Patient was transferred to [facility], redeveloped left facial droop, left arm weakness, left-sided neglect.  NIH score 4.  CT perfusion found 23 mL  penumbra.  Patient was treated with IV TNK and went for thrombectomy which was successful in establishing TICI 2C reperfusion.  Follow-up MR brain found evolving right MCA stroke with petechial hemorrhagic conversion     The patient currently reports lethargy.  Patient has dysarthria.  She has a hard time staying awake during examination.  Patient is off to CT scan shortly after my exam concludes.  She reports no prior functional deficit.  Exam is significant for lack of motor control on the left with significant tone in the upper and lower extremity.  Patient has difficulty with fine motor coordination on the right.     ROS  Pertinent positives are mentioned in the HPI, all others reviewed and are negative.     Social Hx:  2 SH  2 SUSY  With: Mother     THERAPY:  Restrictions: Fall risk, swallow ppx  PT: Functional mobility   Pending      OT: ADLs  Pending      SLP:   Pending      IMAGING:  MRI brain 7/5/2024    Acute infarct in the right frontoparietal region and right insula predominantly involving the cortex. Acute infarct also noted in the right caudate and putamen.     Minimal subarachnoid hemorrhage noted in the sylvian fissure and the sulci over the convexity, likely procedure related.     No midline shift or significant mass effect.     PROCEDURES:  Dr. Kavita Matson MD 7/5/2024  Cerebral angiogram and mechanical thrombectomy   Right MCA M2/3 parietal branch occlusion. Final recanalization score TICI 2C.      PMH:    Past Medical History  Past Medical History:  Diagnosis Date   Abdominal pain, left upper quadrant     Anxiety     Bronchitis     Chest pain     Cough     Depression     Early satiety     Gastritis     Helicobacter pylori (H. pylori)     Jaw pain     Migraine     Musculoskeletal pain      Back   Otitis media     Right wrist pain     Sinus disorder     Sinusitis     SOB (shortness of breath)     Thrombocytosis     Tonsillitis     URI (upper respiratory infection)     Yeast infection        "     PSH:    Past Surgical History  Past Surgical History:  Procedure Laterality Date   ABDOMINAL EXPLORATION              FHX:    Family History  Family History  Problem Relation Age of Onset   Diabetes Mother     Hypertension Father     Diabetes Maternal Grandmother     Heart Disease Maternal Grandmother     Diabetes Paternal Grandmother     Diabetes Paternal Grandfather     Cancer Paternal Grandfather            Medications:    Current Facility-Administered Medications  Medication Dose   potassium chloride SA (Kdur) tablet 40 mEq  40 mEq   labetalol (Normodyne/Trandate) injection 10 mg  10 mg   hydrALAZINE (Apresoline) injection 10 mg  10 mg   niCARdipine (Cardene) 25 mg in  mL Standard Infusion  0-15 mg/hr   acetaminophen (Tylenol) tablet 650 mg  650 mg   ondansetron (Zofran) syringe/vial injection 4 mg  4 mg   ondansetron (Zofran ODT) dispertab 4 mg  4 mg   promethazine (Phenergan) tablet 12.5-25 mg  12.5-25 mg   promethazine (Phenergan) suppository 12.5-25 mg  12.5-25 mg   prochlorperazine (Compazine) injection 5-10 mg  5-10 mg   LORazepam (Ativan) injection 0.5-1 mg  0.5-1 mg   amitriptyline (Elavil) tablet 100 mg  100 mg   docusate sodium (Colace) capsule 100 mg  100 mg   folic acid (Folvite) tablet 2 mg  2 mg   gabapentin (Neurontin) capsule 300 mg  300 mg   norepinephrine (Levophed) 8 mg in 250 mL NS infusion (premix)  0-1 mcg/kg/min   rosuvastatin (Crestor) tablet 5 mg  5 mg   enoxaparin (Lovenox) inj 80 mg  1 mg/kg   aspirin EC tablet 81 mg  81 mg   HYDROcodone-acetaminophen (Norco) 5-325 MG per tablet 1 Tablet  1 Tablet   lactated ringers infusion          Allergies:    Allergies  Allergen Reactions   Pcn [Penicillins] Rash   Augmentin Diarrhea      diarrhea   Zithromax [Azithromycin] Rash      Rash           Physical Exam:  Vitals: BP (!) 176/76   Pulse 64   Temp 36.7 °C (98.1 °F) (Temporal)   Resp 20   Ht 1.473 m (4' 10\")   Wt 82.5 kg (181 lb 14.1 oz)   SpO2 94%   Gen: NAD  Head: " NC/AT  Eyes/ Nose/ Mouth: PERRLA, moist mucous membranes  Cardio: RRR, good distal perfusion, warm extremities  Pulm: normal respiratory effort, no cyanosis   Abd: Soft NTND, negative borborygmi   Ext: No peripheral edema. No calf tenderness. No clubbing.     Mental status:  A&Ox4 (person, place, date, situation) answers questions appropriately  Speech: moderate dysarthria     Motor:                              Upper Extremity  Myotome R L  Shoulder flexion C5 3/5 0/5  Elbow flexion C5 3/5 0/5  Wrist extension C6 3/5 0/5  Elbow extension C7 3/5 0/5  Finger flexion C8 3/5 0/5  Finger abduction T1 3/5 0/5       Lower Extremity Myotome R L  Hip flexion L2 1/5 0/5  Knee extension L3 1/5 0/5  Ankle dorsiflexion L4 1/5 0/5  Toe extension L5 1/5 0/5  Ankle plantarflexion S1 1/5 0/5     Sensory:   intact to light touch through out     Tone: MAS 2 tone in left upper and lower extremities      Coordination:   Altered fine motor with fingers on right, unable to test left.         Labs: Reviewed and significant for     Recent Labs    07/05/24  0140 07/05/24  0533 07/05/24  0930 07/05/24  0950 07/06/24  0440  RBC 4.28 4.02*  --   --  3.64*  HEMOGLOBIN 13.5 12.9  --   --  11.8*  HEMATOCRIT 40.4 37.9  --   --  34.5*  PLATELETCT 585* 526*  --   --  566*  PROTHROMBTM  --   --  13.3 13.9  --   APTT  --   --  21.7* <20.0*  --   INR  --   --  1.00 1.06  --        Recent Labs    07/05/24  0140 07/05/24  0456 07/06/24  0440  SODIUM 142 139 143  POTASSIUM 3.9 4.1 3.7  CHLORIDE 104 107 107  CO2 25 19* 15*  GLUCOSE 121* 112* 142*  BUN 8 8 4*  CREATININE 0.9 0.59 0.60  CALCIUM 8.9 8.8 8.8       Recent Results  Recent Results (from the past 24 hour(s))  EKG    Collection Time: 07/05/24  9:08 AM  Result Value Ref Range    Report          [facility]     Test Date:  2024-07-05  Pt Name:    DUSTIN LEZAMA             Department: [facility]  MRN:        [id number]                      Room:       ER Room 4  Gender:     Female                        Technician: JENNA  :        [id number]                   Requested By:NANCY GILLIS  Order #:    [id number]                    Reading MD: Austin Barney MD     Measurements  Intervals                                Axis  Rate:       82                           P:          20  VT:         137                          QRS:        17  QRSD:       86                           T:          9  QT:         375  QTc:        438     Interpretive Statements  Sinus rhythm  Nonspecific ST-T wave changes  Compared to ECG 2019 15:34:34  No significant changes  Electronically Signed On 2024 09:14:47 PDT by Austin Barney MD     LUPUS ANTICOAGULANT    Collection Time: 24  9:30 AM  Result Value Ref Range    PT 13.3 12.0 - 14.6 sec    INR 1.00 0.87 - 1.13    APTT 21.7 (L) 24.7 - 36.0 sec    Heparin Xa (LMWH) <0.1 U/mL    Dilute Rian Viper Venom Ever 31.2 28.0 - 48.0 sec    Lupus Anticoagulant Not Present    MAGNESIUM    Collection Time: 24  9:30 AM  Result Value Ref Range    Magnesium 1.8 1.5 - 2.5 mg/dL  APTT    Collection Time: 24  9:50 AM  Result Value Ref Range    APTT <20.0 (L) 24.7 - 36.0 sec  Prothrombin Time    Collection Time: 24  9:50 AM  Result Value Ref Range    PT 13.9 12.0 - 14.6 sec    INR 1.06 0.87 - 1.13  CBC with Differential    Collection Time: 24  4:40 AM  Result Value Ref Range    WBC 14.6 (H) 4.8 - 10.8 K/uL    RBC 3.64 (L) 4.20 - 5.40 M/uL    Hemoglobin 11.8 (L) 12.0 - 16.0 g/dL    Hematocrit 34.5 (L) 37.0 - 47.0 %    MCV 94.8 81.4 - 97.8 fL    MCH 32.4 27.0 - 33.0 pg    MCHC 34.2 32.2 - 35.5 g/dL    RDW 48.1 35.9 - 50.0 fL    Platelet Count 566 (H) 164 - 446 K/uL    MPV 9.4 9.0 - 12.9 fL    Neutrophils-Polys 80.20 (H) 44.00 - 72.00 %    Lymphocytes 11.50 (L) 22.00 - 41.00 %    Monocytes 7.70 0.00 - 13.40 %    Eosinophils 0.00 0.00 - 6.90 %    Basophils 0.20 0.00 - 1.80 %    Immature Granulocytes 0.40 0.00 - 0.90 %    Nucleated RBC 0.00 0.00 - 0.20 /100 WBC     Neutrophils (Absolute) 11.73 (H) 1.82 - 7.42 K/uL    Lymphs (Absolute) 1.69 1.00 - 4.80 K/uL    Monos (Absolute) 1.13 (H) 0.00 - 0.85 K/uL    Eos (Absolute) 0.00 0.00 - 0.51 K/uL    Baso (Absolute) 0.03 0.00 - 0.12 K/uL    Immature Granulocytes (abs) 0.06 0.00 - 0.11 K/uL    NRBC (Absolute) 0.00 K/uL  Basic Metabolic Panel (BMP)    Collection Time: 07/06/24  4:40 AM  Result Value Ref Range    Sodium 143 135 - 145 mmol/L    Potassium 3.7 3.6 - 5.5 mmol/L    Chloride 107 96 - 112 mmol/L    Co2 15 (L) 20 - 33 mmol/L    Glucose 142 (H) 65 - 99 mg/dL    Bun 4 (L) 8 - 22 mg/dL    Creatinine 0.60 0.50 - 1.40 mg/dL    Calcium 8.8 8.5 - 10.5 mg/dL    Anion Gap 21.0 (H) 7.0 - 16.0  Magnesium    Collection Time: 07/06/24  4:40 AM  Result Value Ref Range    Magnesium 2.1 1.5 - 2.5 mg/dL  Lipid Profile    Collection Time: 07/06/24  4:40 AM  Result Value Ref Range    Cholesterol,Tot 194 100 - 199 mg/dL    Triglycerides 217 (H) 0 - 149 mg/dL    HDL 70 >=40 mg/dL    LDL 81 <100 mg/dL  ESTIMATED GFR    Collection Time: 07/06/24  4:40 AM  Result Value Ref Range    GFR (CKD-EPI) 119 >60 mL/min/1.73 m 2             ASSESSMENT:  Patient is a 33 y.o. female admitted with right M3 occlusion, status post TNK, TICI 2C reperfusion     Rehabilitation: Impaired ADLs and mobility  Barriers to transfer include: Insurance authorization, TCCs to verify disposition, medical clearance and bed availability. All cases are subject to administrative review.      [facility] Code / Diagnosis to Support: 0001.1 - Stroke: Left Body Involvement (Right Brain)     Disposition recommendations:  -Patient has suffered a large right MCA stroke.  She has poor motor strength on the left, increased tone, and significant lethargy that is affecting her ability to function on the right.  She also has incoordination with fine motor movements on the right.  She will need extensive rehab.  Unclear at this time if patient will qualify for inpatient rehab because she will need a  lot of of family support.   -PMR to follow in the periphery for rehab appropriateness, please reach out with questions or request for medical management        Medical Complexity:     Large R MCA stroke   - Secondary to Right M3 occlusion, now s/p TNK and thrombectomy with TICI 2C reperfusion  - Anticoagulation with Lovenox 80mg BID   - Daily ASA  - Awaiting PT/OT evals   - Potential candidate for IPR, may need SNF      Spasticity   - Starting baclofen 5mg TID, lower dose due to lethargy. Plan to increase later in the week      Hypertension   - -160 goal per neuro   - Augmented with levophed     Neuropathic pain   - Gabapentin 300mg BID      Lethargy   - Avoid benzodiazepines      DVT PPX: Lovenox         Thank you for allowing us to participate in the care of this patient.      Patient was seen for >80 minutes on unit/floor of which > 50% of time was spent on counseling and coordination of care regarding the above, including prognosis, risk reduction, benefits of treatment, and options for next stage of care.     Fritz Lovell,    Physical Medicine and Rehabilitation             Critical Care Consultation     Date of consult: 7/5/2024     Referring Physician  Adria Lawson M.D.     Reason for Consultation  R MCA CVA with M2 thrombus     History of Presenting Illness  33 y.o. 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  [place] [facility] [place] onset of left-sided weakness and confusion at 2330 last night.  She was seen in [facility] but symptoms resolved at that time.  She had a normal head CT without contrast but a CTA showed a right M3 occlusion.  She was transferred to [facility] without requiring thrombolytics.  While in the ED, she again developed symptoms and a repeat CTA at  that time showed an M2 occlusion for which she was administered TNK and went to IR for mechanical thrombectomy.  Unfortunately the thrombectomy was unsuccessful and resulted in an ICA dissection and essentially TICI 0 flow.  She was brought to the intensive care unit and I was consulted for critical care management.              - sinus tach              - TNK @0528, thrombectomy 0725              - RASS (-)1, follows weaker on L side              - anxious              - SBP               - WBC elevated     Code Status  Full Code     Review of Systems  Review of Systems   Constitutional:  Negative for chills, fever and malaise/fatigue.   HENT:  Negative for congestion and sore throat.    Eyes:  Negative for blurred vision.   Respiratory:  Negative for cough, sputum production and shortness of breath.    Cardiovascular:  Negative for chest pain, palpitations and leg swelling.   Gastrointestinal:  Negative for abdominal pain, nausea and vomiting.   Genitourinary:  Negative for dysuria.   Musculoskeletal:  Negative for back pain and myalgias.   Skin:  Negative for rash.   Neurological:  Positive for dizziness, sensory change and focal weakness. Negative for speech change and headaches.   Endo/Heme/Allergies:  Does not bruise/bleed easily.   Psychiatric/Behavioral:  Negative for depression. The patient is not nervous/anxious.    All other systems reviewed and are negative.        Past Medical History   has a past medical history of Abdominal pain, left upper quadrant, Anxiety, Bronchitis, Chest pain, Cough, Depression, Early satiety, Gastritis, Helicobacter pylori (H. pylori), Jaw pain, Migraine, Musculoskeletal pain, Otitis media, Right wrist pain, Sinus disorder, Sinusitis, SOB (shortness of breath), Thrombocytosis, Tonsillitis, URI (upper respiratory infection), and Yeast infection.     Surgical History   has a past surgical history that includes abdominal exploration.     Family History  family history includes  Cancer in her paternal grandfather; Diabetes in her maternal grandmother, mother, paternal grandfather, and paternal grandmother; Heart Disease in her maternal grandmother; Hypertension in her father.     Social History   reports that she has never smoked. She has never used smokeless tobacco. She reports that she does not drink alcohol and does not use drugs.     Medications  Home Medications          Reviewed by Floresita Alves (Pharmacy [facility]) on 07/05/24 at 0604  Med List Status: Complete       Medication Last Dose Status  acetaminophen/caffeine/butalbital 300-40-50 mg (FIORICET) -40 MG Cap capsule unknown Active  amitriptyline (ELAVIL) 100 MG Tab unknown Active  docusate sodium (COLACE) 100 MG Cap unknown Active  folic acid (FOLVITE) 1 MG Tab unknown Active  gabapentin (NEURONTIN) 300 MG Cap unknown Active  HYDROcodone-acetaminophen (NORCO) 5-325 MG Tab per tablet unknown Active  HYDROcodone-acetaminophen (NORCO) 5-325 MG Tab per tablet new script Active  HYDROcodone-acetaminophen (NORCO) 5-325 MG Tab per tablet new script Active  hydroxychloroquine (PLAQUENIL) 200 MG Tab unknown Active  LORazepam (ATIVAN) 1 MG Tab unknown Active  methotrexate 2.5 MG tablet unknwon Active  metoprolol SR (TOPROL XL) 25 MG TABLET SR 24 HR unknown Active  metronidazole (METROCREAM) 0.75 % cream unknown Active  naproxen (NAPROSYN) 500 MG Tab unknown Active  phentermine 15 MG capsule unknown Active  rosuvastatin (CRESTOR) 5 MG Tab unknown Active  Upadacitinib ER (RINVOQ) 15 MG TABLET SR 24 HR unknown Active                     Audit from Redirected Encounters   **Home medications have not yet been reviewed for this encounter**        Current Medications  Current Facility-Administered Medications  Medication Dose Route Frequency Provider Last Rate Last Admin   NS (Bolus) 0.9 % infusion 500 mL  500 mL Intravenous Once PRN Preet Fernandez M.D.       fentaNYL (Sublimaze) injection 12.5-50 mcg  12.5-50 mcg Intravenous PRN Preet MAGAÑA  GIOVANNI Fernandez   25 mcg at 07/05/24 0642   midazolam (Versed) injection 0.5-2 mg  0.5-2 mg Intravenous PRN Preet Fernandez M.D.   0.5 mg at 07/05/24 0642       Current Outpatient Medications  Medication Sig Dispense Refill   docusate sodium (COLACE) 100 MG Cap Take 100 mg by mouth 2 times a day.       phentermine 15 MG capsule Take 1 Capsule by mouth every morning for 60 days. 30 Capsule 1   acetaminophen/caffeine/butalbital 300-40-50 mg (FIORICET) -40 MG Cap capsule Take 1 Capsule by mouth 1 time a day as needed for Headache for up to 30 days. 20 Capsule 0   LORazepam (ATIVAN) 1 MG Tab Take 1 Tablet by mouth 1 time a day as needed for Anxiety (anxiousness) for up to 90 days. Do not fill till 6/8/24 30 Tablet 2   HYDROcodone-acetaminophen (NORCO) 5-325 MG Tab per tablet Take 1 Tablet by mouth 1 time a day as needed (joint pain and body aches) for up to 30 days. 30 Tablet 0   [START ON 7/6/2024] HYDROcodone-acetaminophen (NORCO) 5-325 MG Tab per tablet 1 pill by mouth once in a day with food as needed for severe joint pain and bodyaches.  Rx for 30 days.  Do not fill till 7/6/2024 30 Tablet 0   [START ON 8/5/2024] HYDROcodone-acetaminophen (NORCO) 5-325 MG Tab per tablet 1 pill by mouth once a day as needed for severe joint pain and bodyaches.  Rx for 30 days.  Do not fill till 8/5/2024. 30 Tablet 0   gabapentin (NEURONTIN) 300 MG Cap Take 1 Capsule by mouth 2 times a day. 60 Capsule 5   hydroxychloroquine (PLAQUENIL) 200 MG Tab TAKE TWO TABLETS BY MOUTH MONDAY-FRIDAY. TAKE ONE TABLET BY MOUTH DAILY ON SATURDAY AND SUNDAY 48 Tablet 5   methotrexate 2.5 MG tablet Take 8 Tablets by mouth every 7 days. 120 Tablet 0   folic acid (FOLVITE) 1 MG Tab TAKE TWO TABLETS BY MOUTH DAILY 180 Tablet 3   rosuvastatin (CRESTOR) 5 MG Tab TAKE 1/2 TO 1 TABLET BY MOUTH EVERY EVENING AS DIRECTED (Patient taking differently: 2.5-5 mg every evening. TAKE 1/2 TO 1 TABLET BY MOUTH EVERY EVENING AS DIRECTED     Alternating days) 90  Tablet 1   Upadacitinib ER (RINVOQ) 15 MG TABLET SR 24 HR Take 15 mg by mouth every day at 6 PM. 90 Tablet 1   metronidazole (METROCREAM) 0.75 % cream Apply 1 Application topically 2 times a day. 45 g 2   naproxen (NAPROSYN) 500 MG Tab Take 1 Tablet by mouth 2 times daily with meals as needed (moderate pain). 60 Tablet 5   amitriptyline (ELAVIL) 100 MG Tab TAKE ONE TABLET BY MOUTH AT BEDTIME AS NEEDED (Patient taking differently: sleep) 90 Tablet 3   metoprolol SR (TOPROL XL) 25 MG TABLET SR 24 HR 25 mg every day.              Allergies  Allergies  Allergen Reactions   Pcn [Penicillins] Rash   Augmentin Diarrhea      diarrhea   Zithromax [Azithromycin] Rash      Rash        Vital Signs last 24 hours  Temp:  [36.3 °C (97.3 °F)-36.4 °C (97.5 °F)] 36.4 °C (97.5 °F)  Pulse:  [] 93  Resp:  [15-41] 24  BP: (108-134)/(55-96) 108/96  SpO2:  [82 %-100 %] 97 %     Physical Exam  Physical Exam  Vitals and nursing note reviewed.   Constitutional:       General: She is awake. She is not in acute distress.     Appearance: Normal appearance. She is well-developed and overweight.      Interventions: Nasal cannula in place.   HENT:      Head: Normocephalic.      Comments: Bruising to left side of forehead     Nose: Nose normal. No congestion.      Mouth/Throat:      Mouth: Mucous membranes are moist.      Pharynx: Oropharynx is clear.   Eyes:      General: No scleral icterus.     Extraocular Movements: Extraocular movements intact.      Conjunctiva/sclera: Conjunctivae normal.      Pupils: Pupils are equal, round, and reactive to light.   Neck:      Vascular: No JVD.      Trachea: No tracheal deviation.   Cardiovascular:      Rate and Rhythm: Normal rate and regular rhythm.      Pulses: Normal pulses.      Heart sounds: Normal heart sounds. No murmur heard.  Pulmonary:      Effort: Pulmonary effort is normal. No respiratory distress.      Breath sounds: Normal breath sounds. No wheezing or rales.   Abdominal:      General:  Bowel sounds are normal. There is no distension.      Palpations: Abdomen is soft.      Tenderness: There is no abdominal tenderness. There is no guarding or rebound.   Musculoskeletal:         General: No tenderness.      Cervical back: Neck supple. No tenderness.      Right lower leg: No edema.      Left lower leg: No edema.      Comments: RLE puncture site with mild oozing, sand bag in place   Skin:     General: Skin is warm and dry.      Capillary Refill: Capillary refill takes less than 2 seconds.      Findings: No rash.   Neurological:      Mental Status: She is alert and oriented to person, place, and time.      Cranial Nerves: No cranial nerve deficit.      Sensory: Sensory deficit present.      Motor: Weakness present.      Comments: LUE/LLE weakness, numbness   Psychiatric:         Mood and Affect: Mood normal.         Behavior: Behavior normal. Behavior is cooperative.         Thought Content: Thought content normal.            Fluids  No intake or output data in the 24 hours ending 07/05/24 0700     Laboratory  Recent Results  Recent Results (from the past 48 hour(s))  CBC WITH DIFFERENTIAL    Collection Time: 07/05/24  1:40 AM  Result Value Ref Range    WBC 13.4 (H) 4.8 - 10.8 K/uL    RBC 4.28 4.20 - 5.40 M/uL    Hemoglobin 13.5 13.0 - 17.0 g/dL    Hematocrit 40.4 39.0 - 50.0 %    MCV 94.4 81.0 - 99.0 fL    MCH 31.5 (H) 27.0 - 31.0 pg    MCHC 33.4 33.0 - 37.0 g/dL    RDW 13.3 11.5 - 14.5 %    Platelet Count 585 (H) 130 - 400 K/uL    MPV 9.0 7.4 - 10.4 fL    Neutrophils Automated 69.2 39.0 - 70.0 %    Lymphocytes Automated 24.0 21.0 - 50.0 %    Monocytes Automated 5.8 2.0 - 9.0 %    Eosinophils Automated 0.4 0.0 - 5.0 %    Basophils Automated 0.4 0.0 - 3.0 %    Abs Neutrophils Automated 9.3 (H) 1.8 - 7.7 K/uL    Abs Lymph Automated 3.2 1.2 - 4.8 K/uL    Eosinophil Count, Blood 0.06 0.00 - 0.50 K/uL  COMP METABOLIC PANEL    Collection Time: 07/05/24  1:40 AM  Result Value Ref Range    Sodium 142 136 - 145  mmol/L    Potassium 3.9 3.5 - 5.1 mmol/L    Chloride 104 98 - 107 mmol/L    Co2 25 21 - 32 mmol/L    Anion Gap 17 10 - 18 mmol/L    Glucose 121 (H) 74 - 99 mg/dL    Bun 8 7 - 18 mg/dL    Creatinine 0.9 0.6 - 1.0 mg/dL    Calcium 8.9 8.5 - 11.0 mg/dL    AST(SGOT) 26 15 - 37 U/L    ALT(SGPT) 25 12 - 78 U/L    Alkaline Phosphatase 63 46 - 116 U/L    Total Bilirubin 0.5 0.2 - 1.0 mg/dL    Albumin 3.7 3.4 - 5.0 g/dL    Total Protein 8.0 6.4 - 8.2 g/dL    A-G Ratio 0.9    URINALYSIS,CULTURE IF INDICATED    Collection Time: 07/05/24  1:40 AM    Specimen: Urine, Clean Catch  Result Value Ref Range    Color YELLOW      Character Sl Cloudy (A)      Specific Gravity >=1.030 (A) 1.003 - 1.030    Ph 6.0 5.0 - 8.0    Glucose NEGATIVE Negative mg/dL    Ketones TRACE (A) Negative mg/dL    Protein TRACE (A) Negative mg/dL    Bilirubin NEGATIVE Negative    Urobilinogen, Urine 0.2 0.2 - 1.0 mg/dL    Nitrite NEGATIVE Negative    Leukocyte Esterase TRACE (A) Negative    Occult Blood NEGATIVE Negative    Culture Indicated Yes UA Culture  ESTIMATED GFR    Collection Time: 07/05/24  1:40 AM  Result Value Ref Range    GFR (CKD-EPI) 85 >60 mL/min/1.73 m 2  URINE MICROSCOPIC (W/UA)    Collection Time: 07/05/24  1:40 AM  Result Value Ref Range    WBC 6-10 (A) 0 - 6 /hpf    RBC 3-5 (A) 0 - 3 /hpf    Bacteria 2+ (A) None /hpf    Epithelial Cells 4+ (A) None /hpf  URINE CULTURE(NEW)    Collection Time: 07/05/24  1:40 AM    Specimen: Urine  Result Value Ref Range    Significant Indicator NEG      Source UR      Site Voided      Urine Culture -    POC URINE PREGNANCY    Collection Time: 07/05/24  1:48 AM  Result Value Ref Range    POC Urine Pregnancy Test Negative Negative    Internal Control Positive Positive      Internal Control Negative Negative      POC Specific Gravity >=1.030 <1.005 - >1.030  COMP METABOLIC PANEL    Collection Time: 07/05/24  4:56 AM  Result Value Ref Range    Sodium 139 135 - 145 mmol/L    Potassium 4.1 3.6 - 5.5 mmol/L     Chloride 107 96 - 112 mmol/L    Co2 19 (L) 20 - 33 mmol/L    Anion Gap 13.0 7.0 - 16.0    Glucose 112 (H) 65 - 99 mg/dL    Bun 8 8 - 22 mg/dL    Creatinine 0.59 0.50 - 1.40 mg/dL    Calcium 8.8 8.5 - 10.5 mg/dL    Correct Calcium 8.7 8.5 - 10.5 mg/dL    AST(SGOT) 25 12 - 45 U/L    ALT(SGPT) 16 2 - 50 U/L    Alkaline Phosphatase 70 30 - 99 U/L    Total Bilirubin 0.3 0.1 - 1.5 mg/dL    Albumin 4.1 3.2 - 4.9 g/dL    Total Protein 7.1 6.0 - 8.2 g/dL    Globulin 3.0 1.9 - 3.5 g/dL    A-G Ratio 1.4 g/dL  HOLD BLOOD BANK SPECIMEN (NOT TESTED)    Collection Time: 07/05/24  4:56 AM  Result Value Ref Range    Holding Tube - Bb DONE    ESTIMATED GFR    Collection Time: 07/05/24  4:56 AM  Result Value Ref Range    GFR (CKD-EPI) 120 >60 mL/min/1.73 m 2  CBC WITH DIFFERENTIAL    Collection Time: 07/05/24  5:33 AM  Result Value Ref Range    WBC 12.3 (H) 4.8 - 10.8 K/uL    RBC 4.02 (L) 4.20 - 5.40 M/uL    Hemoglobin 12.9 12.0 - 16.0 g/dL    Hematocrit 37.9 37.0 - 47.0 %    MCV 94.3 81.4 - 97.8 fL    MCH 32.1 27.0 - 33.0 pg    MCHC 34.0 32.2 - 35.5 g/dL    RDW 46.9 35.9 - 50.0 fL    Platelet Count 526 (H) 164 - 446 K/uL    MPV 9.0 9.0 - 12.9 fL    Neutrophils-Polys 80.50 (H) 44.00 - 72.00 %    Lymphocytes 11.40 (L) 22.00 - 41.00 %    Monocytes 4.40 0.00 - 13.40 %    Eosinophils 2.90 0.00 - 6.90 %    Basophils 0.20 0.00 - 1.80 %    Immature Granulocytes 0.60 0.00 - 0.90 %    Nucleated RBC 0.00 0.00 - 0.20 /100 WBC    Neutrophils (Absolute) 9.88 (H) 1.82 - 7.42 K/uL    Lymphs (Absolute) 1.40 1.00 - 4.80 K/uL    Monos (Absolute) 0.54 0.00 - 0.85 K/uL    Eos (Absolute) 0.35 0.00 - 0.51 K/uL    Baso (Absolute) 0.03 0.00 - 0.12 K/uL    Immature Granulocytes (abs) 0.07 0.00 - 0.11 K/uL    NRBC (Absolute) 0.00 K/uL          Imaging  IR-THROMBO MECHANICAL ARTERY,INIT  Final Result        33-year-old who presented with left-sided symptoms was found to have a right MCA M3 occlusion with a perfusion defect identified on CT perfusion imaging.  Patient underwent emergent mechanical thrombectomy with multiple attempts to recanalize the parietal   M3 branch being unsuccessful. Final angiographic images demonstrate good antegrade flow in the MCA branches other than the occluded right MCA M3 parietal branch.     Short segment dissection of the right ICA just below the skull base was identified on the final angiogram.  Findings discussed with Dr. Fernandez and given the nonprogressive nature of the dissection without flow limitation, the dissection will be managed by   initiation of antithrombotic/anticoagulation therapy.     Final recanalization score: TICI 2 C     I, Kavita Matson was physically present and participated during the entire procedure of the IR-THROMBO MECHANICAL ARTERY,INIT.                 DX-CHEST-PORTABLE (1 VIEW)  Final Result     Mild interstitial prominence could be related to hypoinflation. No consolidation or pleural effusion.     CT-CTA NECK WITH & W/O-POST PROCESSING  Final Result     CT angiogram of the neck within normal limits.     CT-CTA HEAD WITH & W/O-POST PROCESS  Final Result     Occlusion of the superior sylvian branch M2 segment RIGHT middle cerebral artery.     These findings were discussed with GAIL AGUIRRE II on 7/5/2024 5:33 AM.           CT-CEREBRAL PERFUSION ANALYSIS  Final Result     1. Cerebral blood flow less than 30% possibly representing completed infarct = 5 mL. Based on distribution of this finding, this is unlikely to represent artifact.     2. T Max more than 6 seconds possibly representing combination of completed infarct and ischemia = 28 mL. Based on the distribution of this finding, this is unlikely to represent artifact.     3. Mismatched volume possibly representing ischemic brain/penumbra= 23 mL     4.  Please note that this cerebral perfusion study and report is Quantitative and targets supratentorial (cerebral) perfusion for evaluation of large vessel territory acute ischemia/infarction. For  "example, lacunar infarcts, and brainstem/posterior fossa   ischemia/infarction are not evaluated on this study.  Data acquisition is subject to artifacts which can yield non-anatomically plausible perfusion maps which may be due to motion, bolus timing, signal to noise ratio, or other technical factors.   Perfusion map abnormalities which show non-anatomic distributions are likely artifact.   This study is not \"stand-alone\" and should only be utilized for diagnosis, management/treatment in correlation with CT, CTA, and/or MRI and clinical factors.        EC-ECHOCARDIOGRAM COMPLETE W/O CONT    (Results Pending)  MR-BRAIN-W/O    (Results Pending)        Assessment/Plan  * Acute ischemic right MCA stroke (HCC)- (present on admission)  Assessment & Plan  With M2 occlusion s/p TNK 7/5 at 0 528, attempted thrombectomy at 0725 with no flow and right distal ICA nonobstructive dissection  Begin systemic anticoagulation with Lovenox after MRI brain as well as aspirin  Neurology and neuro IR consulted  Echocardiogram  Continue statin  Goal -160 with augmentation using norepinephrine drip as needed  Hematology consultation given hypercoagulable condition, hypercoagulable workup ordered     SLE (systemic lupus erythematosus related syndrome) (HCC)- (present on admission)  Assessment & Plan  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  Continue methotrexate, hydroxychloroquine and Rinvoq  Hematology consultation  Monitor for antiphospholipid syndrome     Thrombocytosis- (present on admission)  Assessment & Plan  Chronic, followed by outpatient hematology in [place]     Mixed dyslipidemia- (present on admission)  Assessment & Plan  Continue rosuvastatin     Hypomagnesemia  Assessment & Plan  Replete and monitor closely     Anxiety- (present on admission)  Assessment & Plan  History of chronic anxiety treated with Ativan  Resume outpatient amitriptyline, as needed IV Ativan for " now          Addendum       RCC     33-year-old woman with autoimmune disorder with recurrent R MCA CVA with R M2 thrombus on CTA.  IV TNK administered at 528 and patient now and IR.  Dr. Fernandez evaluating see his consultation note.  Case reviewed with nursing staff in the ER, patient protecting her airway and is normotensive and anxiety has improved.  Critical care team will see post IR in the [facility] ICU          Neurology STROKE CODE H&P  Neurohospitalist Service, [facility]     Referring Physician: Adria Lawson II, M*     STROKE CODE:   Chief Complaint  Patient presents with   Sent by MD   Weakness      Transfer from [place] for further evaluation of unilateral weakness.         To obtain the most accurate data regarding the time called, and time patient seen, refer to the stroke run-sheet and chart.  For time of CT, refer to the radiology report. See A&P below for TPA Decision and door to needle time if and when applicable.     HPI: Ellie Sánchez is a 33 year old woman with an undefined autoimmune disorder, on multiple immunomodulating therapies, presenting with L side symptoms.  She was initially seen at [facility].  Around 1130PM while on the toilet, she suddenly developed L side weakness, confusion.  Mom called the medics, and they noted face droop on their arrival to the house.  She was taken to [facility], but symptoms resolved by time of arrival to hospital.  Symptoms lasted about 30 minutes.  At [facility] she underwent a head CT that was a normal study.  A CT angiogram of the head with a R M3 occlusion.  No CTA of the neck was completed.  IV-thrombolytic therapy was deferred given she had returned to baseline.  On arrival to [facility], around 415AM, she appeared to be normal.  She then got up to use the bathroom, and developed anxiety, mild confusion, a left face droop, left arm weakness, and left side neglect.  NIHSS 4 as documented below.  Stroke alert  activated and she was taken to CT scanner.  On ROS, denies infectious prodrome or constitutional symptoms.  On chart review, she has undefined versus seronegative rheumatoid arthritis.  She is on methotrexate, hydroxychloroquine, and rinvoq for this.  In addition, she has thrombocytosis, and is followed by cardiology for heart palpitations.     Review of systems: In addition to what is detailed in the HPI above, all other systems reviewed and are negative.     Past Medical History:    has a past medical history of Abdominal pain, left upper quadrant, Anxiety, Bronchitis, Chest pain, Cough, Depression, Early satiety, Gastritis, Helicobacter pylori (H. pylori), Jaw pain, Migraine, Musculoskeletal pain, Otitis media, Right wrist pain, Sinus disorder, Sinusitis, SOB (shortness of breath), Thrombocytosis, Tonsillitis, URI (upper respiratory infection), and Yeast infection.     FHx:  family history includes Cancer in her paternal grandfather; Diabetes in her maternal grandmother, mother, paternal grandfather, and paternal grandmother; Heart Disease in her maternal grandmother; Hypertension in her father.     SHx:   reports that she has never smoked. She has never used smokeless tobacco. She reports that she does not drink alcohol and does not use drugs.     Allergies:  Allergies  Allergen Reactions   Pcn [Penicillins] Rash   Augmentin Diarrhea      diarrhea   Zithromax [Azithromycin] Rash      Rash        Medications:  No current facility-administered medications for this encounter.     Current Outpatient Medications:     phentermine 15 MG capsule, Take 1 Capsule by mouth every morning for 60 days., Disp: 30 Capsule, Rfl: 1    acetaminophen/caffeine/butalbital 300-40-50 mg (FIORICET) -40 MG Cap capsule, Take 1 Capsule by mouth 1 time a day as needed for Headache for up to 30 days., Disp: 20 Capsule, Rfl: 0    LORazepam (ATIVAN) 1 MG Tab, Take 1 Tablet by mouth 1 time a day as needed for Anxiety (anxiousness) for up  to 90 days. Do not fill till 6/8/24, Disp: 30 Tablet, Rfl: 2    HYDROcodone-acetaminophen (NORCO) 5-325 MG Tab per tablet, Take 1 Tablet by mouth 1 time a day as needed (joint pain and body aches) for up to 30 days., Disp: 30 Tablet, Rfl: 0    [START ON 7/6/2024] HYDROcodone-acetaminophen (NORCO) 5-325 MG Tab per tablet, 1 pill by mouth once in a day with food as needed for severe joint pain and bodyaches.  Rx for 30 days.  Do not fill till 7/6/2024, Disp: 30 Tablet, Rfl: 0    [START ON 8/5/2024] HYDROcodone-acetaminophen (NORCO) 5-325 MG Tab per tablet, 1 pill by mouth once a day as needed for severe joint pain and bodyaches.  Rx for 30 days.  Do not fill till 8/5/2024., Disp: 30 Tablet, Rfl: 0    gabapentin (NEURONTIN) 300 MG Cap, Take 1 Capsule by mouth 2 times a day., Disp: 60 Capsule, Rfl: 5    hydroxychloroquine (PLAQUENIL) 200 MG Tab, TAKE TWO TABLETS BY MOUTH MONDAY-FRIDAY. TAKE ONE TABLET BY MOUTH DAILY ON SATURDAY AND SUNDAY, Disp: 48 Tablet, Rfl: 5    methotrexate 2.5 MG tablet, Take 8 Tablets by mouth every 7 days., Disp: 120 Tablet, Rfl: 0    Norgestim-Eth Estrad Triphasic (TRI-SPRINTEC) 0.18/0.215/0.25 MG-35 MCG Tab, Take 1 Tablet by mouth every day., Disp: 84 Tablet, Rfl: 0    folic acid (FOLVITE) 1 MG Tab, TAKE TWO TABLETS BY MOUTH DAILY, Disp: 180 Tablet, Rfl: 3    rosuvastatin (CRESTOR) 5 MG Tab, TAKE 1/2 TO 1 TABLET BY MOUTH EVERY EVENING AS DIRECTED, Disp: 90 Tablet, Rfl: 1    fluconazole (DIFLUCAN) 100 MG Tab, Take first tablet on first day of symptoms. Take second dose 72 hours later if symptoms persist., Disp: 2 Tablet, Rfl: 0    Upadacitinib ER (RINVOQ) 15 MG TABLET SR 24 HR, Take 15 mg by mouth every day at 6 PM., Disp: 90 Tablet, Rfl: 1    metronidazole (METROCREAM) 0.75 % cream, Apply 1 Application topically 2 times a day., Disp: 45 g, Rfl: 2    Ivermectin 1 % Cream, Apply 1 Application topically every day at 6 PM., Disp: 30 g, Rfl: 1    naproxen (NAPROSYN) 500 MG Tab, Take 1 Tablet by  "mouth 2 times daily with meals as needed (moderate pain)., Disp: 60 Tablet, Rfl: 5    amitriptyline (ELAVIL) 100 MG Tab, TAKE ONE TABLET BY MOUTH AT BEDTIME AS NEEDED, Disp: 90 Tablet, Rfl: 3    metoprolol SR (TOPROL XL) 25 MG TABLET SR 24 HR, , Disp: , Rfl:     Cholecalciferol (VITAMIN D3 GUMMIES ADULT PO), Take  by mouth., Disp: , Rfl:     acyclovir (ZOVIRAX) 200 MG Cap, TAKE 4 CAPSULES BY MOUTH ONCE DAILY FOR 5 DAYS, Disp: 20 Capsule, Rfl: 1    cyanocobalamin (VITAMIN B12) 1000 MCG Tab, Take 2 Tablets by mouth every day., Disp: 180 Tablet, Rfl: 1    albuterol 108 (90 BASE) MCG/ACT Aero Soln inhalation aerosol, Inhale 2 Puffs by mouth every 6 hours as needed for Shortness of Breath., Disp: 8.5 g, Rfl: 0     Physical Examination:     Vitals  Vitals:    07/05/24 0415 07/05/24 0421  BP:   133/84  Pulse:   98  Resp:   18  Temp:   36.4 °C (97.5 °F)  TempSrc:   Temporal  SpO2:   98%  Weight: 83 kg (183 lb)    Height: 1.473 m (4' 10\")               General: Patient is awake, she is anxious  Eye: Examination of optic disks not indicated at this time given acuity of consult  Neck: There is normal range of motion  CV: Regular rate   Extremities:  Clear, dry, intact, without peripheral edema     NEUROLOGICAL EXAM:      Mental status: Awake, anxious, oriented  Speech and language: Speech is clear and fluent. The patient is able to name and repeat, and follow commands  Cranial nerve exam:  Visual fields are full. There is no nystagmus. Extraocular muscles are intact. There is a L face droop   Motor exam: There is sustained antigravity with no downward drift in R and bilateral legs.   Her LUE is antigravity with drift.    Sensory exam:  Reacts to tactile in all 4 distal extremities, there is L side neglect.  There is right/left confusion  Coordination: No ataxia on elicited movements  Gait: Deferred due to patient preference.     NIHSS: National Institutes of Health Stroke Scale     [0] 1a:Level of Consciousness           " 0-alert 1-drowsy   2-stupor   3-coma  [0] 1b:LOC Questions                         0-both  1-one      2-neither  [0] 1c:LOC Commands                       0-both  1-one      2-neither  [0] 2: Best Gaze                      0-nl    1-partial  2-forced  [0] 3: Visual Fields                              0-nl    1-partial  2-complete 3-bilat  [2] 4: Facial Paresis                0-nl    1-minor    2-partial  3-full  MOTOR                                              0-nl  [0] 5: Right Arm                       1-drift  [1] 6: Left Arm                                     2-some effort vs gravity  [0] 7: Right Leg                       3-no effort vs gravity  [0] 8: Left Leg                                      4-no movement                                                              x-untestable  [0] 9: Limb Ataxia                    0-abs   1-1_limb   2-2+_limbs                                                              x-untestable  [0] 10:Sensory                        0-nl    1-partial  2-dense  [0] 11:Best Language/Aphasia         0-nl    1-mild/mod 2-severe   3-mute  [0] 12:Dysarthria                     0-nl    1-mild/mod 2-severe                                                              x-untestable  [1] 13:Neglect/Inattention                   0-none  1-partial  2-complete  [4] TOTAL     Baseline Modified Anthony Scale (MRS): 1 = No significant disability, despite symptoms; able to perform all usual duties and activities     Objective Data:     Labs:  Lab Results  Component Value Date/Time    PROTHROMBTM 12.5 10/26/2015 07:35 AM    INR 0.9 10/26/2015 07:35 AM     Lab Results  Component Value Date/Time    WBC 13.4 (H) 07/05/2024 01:40 AM    RBC 4.28 07/05/2024 01:40 AM    HEMOGLOBIN 13.5 07/05/2024 01:40 AM    HEMATOCRIT 40.4 07/05/2024 01:40 AM    MCV 94.4 07/05/2024 01:40 AM    MCH 31.5 (H) 07/05/2024 01:40 AM    MCHC 33.4 07/05/2024 01:40 AM    MPV 9.0 07/05/2024 01:40 AM    NEUTSPOLYS 41.7 05/21/2024  10:47 AM    LYMPHOCYTES 50.1 (H) 05/21/2024 10:47 AM    MONOCYTES 6.9 05/21/2024 10:47 AM    EOSINOPHILS 0.6 05/21/2024 10:47 AM    BASOPHILS 0.7 05/21/2024 10:47 AM     Lab Results  Component Value Date/Time    SODIUM 142 07/05/2024 01:40 AM    POTASSIUM 3.9 07/05/2024 01:40 AM    CHLORIDE 104 07/05/2024 01:40 AM    CO2 25 07/05/2024 01:40 AM    GLUCOSE 121 (H) 07/05/2024 01:40 AM    BUN 8 07/05/2024 01:40 AM    CREATININE 0.9 07/05/2024 01:40 AM    BUNCREATRAT 18 05/26/2017 07:09 AM    GLOMRATE 95 10/31/2023 10:46 AM     Lab Results  Component Value Date/Time    CHOLSTRLTOT 244 (H) 05/26/2017 07:09 AM    CHOLSTRLTOT 198 05/07/2012 12:00 AM     (H) 05/26/2017 07:09 AM     05/07/2012 12:00 AM    HDL 69 05/26/2017 07:09 AM    HDL 44 05/07/2012 12:00 AM    TRIGLYCERIDE 246 (H) 05/26/2017 07:09 AM    TRIGLYCERIDE 80 05/07/2012 12:00 AM      Lab Results  Component Value Date/Time    ALKPHOSPHAT 63 07/05/2024 01:40 AM    ASTSGOT 26 07/05/2024 01:40 AM    ALTSGPT 25 07/05/2024 01:40 AM    TBILIRUBIN 0.5 07/05/2024 01:40 AM        Imaging/Testing:     I interpreted and/or reviewed the patient's neuroimaging     CT-CTA NECK WITH & W/O-POST PROCESSING  Final Result     CT angiogram of the neck within normal limits.     CT-CTA HEAD WITH & W/O-POST PROCESS  Final Result     Occlusion of the superior sylvian branch M2 segment RIGHT middle cerebral artery.     These findings were discussed with GAIL AGUIRRE II on 7/5/2024 5:33 AM.           CT-CEREBRAL PERFUSION ANALYSIS  Final Result     1. Cerebral blood flow less than 30% possibly representing completed infarct = 5 mL. Based on distribution of this finding, this is unlikely to represent artifact.     2. T Max more than 6 seconds possibly representing combination of completed infarct and ischemia = 28 mL. Based on the distribution of this finding, this is unlikely to represent artifact.     3. Mismatched volume possibly representing ischemic brain/penumbra=  "23 mL     4.  Please note that this cerebral perfusion study and report is Quantitative and targets supratentorial (cerebral) perfusion for evaluation of large vessel territory acute ischemia/infarction. For example, lacunar infarcts, and brainstem/posterior fossa   ischemia/infarction are not evaluated on this study.  Data acquisition is subject to artifacts which can yield non-anatomically plausible perfusion maps which may be due to motion, bolus timing, signal to noise ratio, or other technical factors.   Perfusion map abnormalities which show non-anatomic distributions are likely artifact.   This study is not \"stand-alone\" and should only be utilized for diagnosis, management/treatment in correlation with CT, CTA, and/or MRI and clinical factors.        DX-CHEST-PORTABLE (1 VIEW)    (Results Pending)  IR-THROMBO MECHANICAL ARTERY,INIT    (Results Pending)        Assessment and Plan:  Ellie Sánchez is a 33 year-old woman with autoimmune disorder, presenting with recurrent R MCA syndrome, now with evidence of a R M2 thrombus on stroke protocol CT.   Discussed risks, benefits, and alternative of IV-TNK with mom (Brandi) and patient, and consensus is to proceed with thrombolytic therapy.  Discussed with Neuro-IR and will proceed to OR for endovascular clot retrieval.  Admit to RICU post-operatively for post-TNK/thrombectomy care.  Unclear etiology of stroke- but given history, most likely related to hypercoagulability from her autoimmune disorder.  Will recommend anticoagulation.  Needs evaluation for antiphospholipid syndrome- as this warrants coumadin therapy as oppose to DOAC.     Problem list:  1.  Right MCA stroke  2.  Undefined autoimmunity     Recommendations:              - I recommend IV-TNK, given at 0528  - proceed to OR with Neuro-IR for endovascular clot retrieval  - admit to RICU post-operatively, neurochecks/NIHSS per post-TNK/thrombectomy protocol  - BP goal typically determined by TICI score, " "however she remains normotensive throughout clinical course.  Will determine need for pressors and increased BP goal if reperfusion is not attained              - expedite MRI brain without contrast              - no need to repeat head CT unless there is clinical deterioration  - no antithrombotics, SCDs only for first 24 hours, anticipate starting anticoagulation for long-term stroke prevention- agent and timing TBD pending MRI and lab results  - stroke labs:  HgbA1c and lipid panel              - ok to continue home crestor 5mg, will aim for LDL goal < 70              - attain hypercoagulable labs:                          - antiphospholipid panel (anti-cardiolipin, anti-[id number], lupus anticoagulant)                          - Factor II mutation, Factor V Leiden, Antithrombin III assay              - complete embolic evaluation with TTE with bubble study, ziopatch monitoring at discharge              - PT/OT/SLP ok even within first 24 hours     Addendum:  Persistent M2/M3 clot.  In addition, there is a non-flow limiting R ICA dissection.  Will set BP goal to 130-160, start vasopressors as needed.  Bedrest only today.  Please expedite MRI brain- start heparin (no bolus protocol) +ASA 81mg daily if no large infarct core or significant hemorrhage as high risk for recurrent events- both from underlying hypercoagulability and thromboembolus from the dissection.     Patient discussed with Dr. Lawson, ER attending, and Dr. Matson, Neuro-IR attending.           Preet Fernandez MD  Vascular Neurology    Current Vital Signs:   Temperature: 36.2 °C (97.1 °F) Pulse: 92 Respiration: (!) 40 Blood Pressure: (!) 150/64  Weight: 80.9 kg (178 lb 5.6 oz) Height: 147.3 cm (4' 10\")  Pulse Oximetry: 96 % O2 (LPM): 1      Completed Laboratory Reports:  Recent Labs     07/06/24  1820 07/07/24  0219 07/07/24  0815 07/07/24  1420 07/07/24  2030 07/08/24  0250 07/08/24  0938 07/08/24  1635 07/09/24  0006 07/09/24  0503 " 07/09/24  0643   WBC  --  15.3*  --   --   --  14.1*  --   --   --  7.5  --    HEMOGLOBIN  --  10.6*  --   --   --  10.8*  --   --   --  9.9*  --    HEMATOCRIT  --  31.4*  --   --   --  33.0*  --   --   --  29.7*  --    PLATELETCT  --  502*  --   --   --  453*  --   --   --  370  --    SODIUM 141 144 142 141 141 146* 145 145 143 143 143   POTASSIUM  --  3.9  --   --   --  3.9  --   --   --  3.6  --    BUN  --  3*  --   --   --  3*  --   --   --  2*  --    CREATININE  --  0.56  --   --   --  0.56  --   --   --  0.36*  --    GLUCOSE  --  134*  --   --   --  123*  --   --   --  123*  --      Additional Labs: Not Applicable    Prior Living Situation:   Housing / Facility: 2 Story House  Steps Into Home: 3  Steps In Home:  (FOS with B/L rails to bedroom/ bathroom)  Lives with - Patient's Self Care Capacity:  (mom)  Equipment Owned: None    Prior Level of Function / Living Situation:   Physical Therapy: Prior Services: Home-Independent  Housing / Facility: 2 Story House  Steps Into Home: 3  Steps In Home:  (FOS with B/L rails to bedroom/ bathroom)  Rail: None  Bathroom Set up: Bathtub / Shower Combination  Equipment Owned: None  Lives with - Patient's Self Care Capacity:  (mom)  Bed Mobility: Independent  Transfer Status: Independent  Ambulation: Independent  Assistive Devices Used: None  Stairs: Independent  Current Level of Function:   Gait Level Of Assist: Unable to Participate  Supine to Sit: Maximal Assist  Sit to Supine: Maximal Assist  Scooting: Total Assist  Rolling: Maximal Assist to Rt.  Comments: HOB raised, use of bed rail, towards R side of the bed, cues for sequencing of task. Platform step placed underneath to facilitate appropriate sitting posture and allow proprioceptive feedback via B/L foot since patient's feet wouldnt reach the floor.  Sit to Stand: Maximal Assist  Bed, Chair, Wheelchair Transfer: Unable to Participate (Due to fluctuating level of alertness)     Occupational Therapy:   Self Feeding:  Independent  Grooming / Hygiene: Independent  Bathing: Independent  Dressing: Independent  Toileting: Independent  Medication Management: Independent  Laundry: Independent  Kitchen Mobility: Independent  Finances: Independent  Home Management: Independent  Shopping: Independent  Prior Level Of Mobility: Independent Without Device in Community  Driving / Transportation: Driving Independent  Prior Services: Home-Independent  Housing / Facility: [address]  Current Level of Function:   Upper Body Dressing: Maximal Assist  Lower Body Dressing: Total Assist  Toileting: Total Assist  Speech Language Pathology:      Rehabilitation Prognosis/Potential: Good  Estimated Length of Stay: 14-21 days    Nursing:      Continent    Scope/Intensity of Services Recommended:  Physical Therapy: 1 hr / day  5 days / week. Therapeutic Interventions Required: Maximize Endurance, Mobility, Strength, and Safety  Occupational Therapy: 1 hr / day 5 days / week. Therapeutic Interventions Required: Maximize Self Care, ADLs, IADLs, and Energy Conservation  Speech & Language Pathology: 1 hr / day 5 days / week. Therapeutic Interventions Required: Maximize Cognition, Swallowing, and Safety  Rehabilitation Nursin/. Therapeutic Interventions Required: Monitor Pain, Skin, Vital Signs, Intake and Output, Labs, Safety, and Aspiration Risk  Rehabilitation Physician: 3 - 5 days / week. Therapeutic Interventions Required: Medical Management  Respiratory Care: consult . Therapeutic Interventions Required: Pulmonary Toileting  Dietician: consult. Therapeutic Interventions Required: Please advise on a diet that is both healthy and promotes healing     She requires 24-hour rehabilitation nursing to manage bowel and bladder function, skin care, and wound. In addition, rehabilitation nursing will reiterate and reinforce therapy skills and equipment use, including ADLs, as well as provide education to the patient and family. Ellie Sánchez is  willing to participate in and is able to tolerate the proposed plan of care.    Rehabilitation Goals and Plan (Expected frequency & duration of treatment in the IRF):   Return to the Community and Family Able to Provide 24/7 Assistance  Anticipated Date of Rehabilitation Admission: unknown  Patient/Family oriented IRF level of care/facility/plan: Yes  Patient/Family willing to participate in IRF care/facility/plan: Yes  Patient able to tolerate IRF level of care proposed: Yes  Patient has potential to benefit IRF level of care proposed: Yes  Comments: Call out to mother Brandi.  Discussed admission and family training.  Brandi agreeable to both. Tyler lives with her mom Brandi in [place] in a 2 story home with 3 SUSY.  Tyler's current bedroom is on the second level, but Brandi is in the process of moving her to a bedroom downstairs.  Discussed possible LOS, clothing needs, visiting hours.  Brandi will provide 24/7 care since she does not work.  All questions answered.  [facility] contact information given.    Special Needs or Precautions - Medical Necessity:  Safety Concerns/Precautions:  Fall Risk / High Risk for Falls  Diet:   DIET ORDERS (From admission to next 24h)       Start     Ordered    07/09/24 0730  Supplements  2X A DAY        Question:  Which Supplement  Answer:  Magic Cup    07/09/24 0729    07/09/24 0730  Diet Order Diet: Level 5 - Minced and Moist; Liquid level: Level 0 - Thin; Fluid modifications: (optional): 1500 ml Fluid Restriction; Tray Modifications (optional): SLP - 1:1 Supervision by Nursing  ALL MEALS        Question Answer Comment   Diet: Level 5 - Minced and Moist    Liquid level Level 0 - Thin    Fluid modifications: (optional) 1500 ml Fluid Restriction    Tray Modifications (optional) SLP - 1:1 Supervision by Nursing        07/09/24 0730                    Anticipated Discharge Destination / Patient/Family Goal:  Destination: Home with Assistance Support System: Family   Anticipated home health  services: OT, PT, SLP, and Nursing  Previously used HH service/ provider: Not Applicable  Anticipated DME Needs: [facility]  Outpatient Services: OT, PT, and SLP  Alternative resources to address additional identified needs:     Pre-Screen Completed: 7/9/2024 11:21 AM Sarah Moreno L.P.N.

## 2024-07-09 NOTE — DIETARY
Nutrition Services: Brief Update    Upon chart review, pt is eating <25% of meals on 7/8 which is decreased from previous 50-75% of meals and has been on oral diet since 7/6. Weight has decreased 1.6 kg in 3 days during admit which is 1.9% significant weight loss. Pt is -0.2L fluids per I/O. No wounds/edema noted. Last BM: 7/9 type 4.     Recommendations/Plan:  Continue diet per SLP recommendations, will update diet order as has free water restrictions and 1.5L fluid restriction.   Will order magic cups BID as they will count as fluids to promote adequate kcals and protein in diet.   Fluids per MD.   Monitor for adequate PO intake.     RD following.

## 2024-07-09 NOTE — PROGRESS NOTES
Critical Care Progress Note    Date of admission  7/5/2024    Chief Complaint  33 y.o. female admitted 7/5/2024 with acute CVA    Hospital Course  Ms. Sánchez is a 33 y.o. 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  She [place] in [place] [facility] but symptoms resolved at that time.  She had a normal head CT without contrast, but a CTA head showed a right M3 occlusion.  She was transferred to [facility] on 7/5/2024 without requiring thrombolytics.  While in the ED, she again developed symptoms and a repeat CTA at that time showed an right M2 occlusion for which she was administered TNK and went to IR for mechanical thrombectomy.  Unfortunately the thrombectomy was unsuccessful and resulted in an ICA dissection with essentially TICI 0 flow.  She was admitted to the intensive care unit for ongoing critical care management.  7/6 - levophed infusion for SBP goals > 130 and < 170, neurosurgery consulted to monitor for need for prophylactic hemocrani, 3% started  7/7 - levo drip, ongoing left sided weakness, 23% bolus needed for reach Na goal  7/8 - SBP goals > 120-->levo ongoing, 3% ongoing, precedex on/off overnight for anxiety    Interval Problem Update  Reviewed last 24 hour events:   - no events overnight   - a/ox4   - left facial droop, left arm paralysis and not w/d'ing   - right side intact   - SR/ST 60-100s   - -160s   - off levo   - afebrile   - last BM today   - UOP of 1.5 liters overnight, foey   - 3% at 60cc/hr   - PT/OT   - no RT issues   - therapeutic lovenox   - asa/statin   - no abx   - home plaquenil restarted   - Mg 1.7   - K  3.6    Yesterday's Events:   - agitated  overnight-->precedex overnight   - precedex is off this am   - a/ox4   - follows on the right, not lifting left arm, slight movement to left leg   - SR/ST 70-170s   - SBP > 120 to < 160   - afebrile   - levo at 0.02   - level 5 diet   - O2 at 3-4 lpm NC   - 3 BMs yesterday   - UOP of 1200cc overnight, Nunez   - 3% at 40cc/hr   - dex at 0.8   - no RT issues   - lovenox, weight based   - no abx   - K 3.9   - Na 146    Review of Systems  Review of Systems   Constitutional:  Positive for malaise/fatigue. Negative for fever.   HENT:  Negative for sore throat.    Eyes:  Negative for double vision.   Respiratory:  Negative for cough and shortness of breath.    Cardiovascular:  Negative for chest pain.   Gastrointestinal:  Negative for abdominal pain, diarrhea and nausea.   Genitourinary:  Negative for flank pain.   Musculoskeletal:  Negative for back pain and myalgias.   Skin:  Negative for itching.   Neurological:  Positive for sensory change, focal weakness and headaches. Negative for dizziness and speech change.   Endo/Heme/Allergies:  Does not bruise/bleed easily.   Psychiatric/Behavioral:  Negative for depression. The patient is nervous/anxious and has insomnia.    All other systems reviewed and are negative.       Vital Signs for last 24 hours   Temp:  [35.9 °C (96.7 °F)-36.4 °C (97.6 °F)] 35.9 °C (96.7 °F)  Pulse:  [] 86  Resp:  [9-74] 15  BP: (123-177)/(58-85) 158/67  SpO2:  [81 %-100 %] 94 %    Respiratory Information for the last 24 hours      Physical Exam   Physical Exam  Vitals and nursing note reviewed.   Constitutional:       General: She is sleeping.      Appearance: She is well-developed. She is obese. She is ill-appearing. She is not toxic-appearing.      Interventions: She is restrained.      Comments: Pleasant/cooperative/calm   HENT:      Head: Normocephalic.      Right Ear: External ear normal.      Left Ear: External ear normal.      Nose: Nose normal. No rhinorrhea.      Mouth/Throat:       Mouth: Mucous membranes are moist.      Pharynx: Oropharynx is clear.   Eyes:      Conjunctiva/sclera: Conjunctivae normal.      Pupils: Pupils are equal, round, and reactive to light.      Comments: Eyes closed but able to open to command   Neck:      Vascular: No JVD.      Trachea: No tracheal deviation.   Cardiovascular:      Rate and Rhythm: Normal rate and regular rhythm.      Chest Wall: PMI is not displaced.      Pulses: Normal pulses.           Radial pulses are 2+ on the right side and 2+ on the left side.        Dorsalis pedis pulses are 2+ on the right side and 2+ on the left side.      Heart sounds: Normal heart sounds. No murmur heard.  Pulmonary:      Effort: Pulmonary effort is normal. No tachypnea or respiratory distress.      Breath sounds: No wheezing, rhonchi or rales.   Abdominal:      General: Bowel sounds are normal. There is no distension.      Palpations: Abdomen is soft.      Tenderness: There is no abdominal tenderness. There is no guarding or rebound.      Comments: Right groin:  large amount of hematoma/bruising   Musculoskeletal:         General: No tenderness.      Cervical back: Neck supple.      Right lower leg: No edema.      Left lower leg: No edema.   Lymphadenopathy:      Cervical: No cervical adenopathy.   Skin:     General: Skin is warm and dry.      Capillary Refill: Capillary refill takes less than 2 seconds.      Findings: No rash.   Neurological:      Mental Status: She is oriented to person, place, and time and easily aroused.      Cranial Nerves: Cranial nerve deficit present.      Sensory: Sensory deficit present.      Motor: Weakness present.      Comments: A/ox4, persistent left-sided weakness and neglect with facial droop   Psychiatric:         Mood and Affect: Mood normal.         Behavior: Behavior normal. Behavior is cooperative.         Medications  Current Facility-Administered Medications   Medication Dose Route Frequency Provider Last Rate Last Admin     haloperidol lactate (Haldol) injection 1 mg  1 mg Intravenous Q4HRS PRN Pete Dillard M.D.   1 mg at 07/08/24 0233    dexmedetomidine (Precedex) 400 mcg/100mL infusion  0.1-1.5 mcg/kg/hr (Order-Specific) Intravenous Continuous Pete Dillard M.D.   Stopped at 07/08/24 1416    simethicone (Mylicon) chewable tablet 125 mg  125 mg Oral TID PRN Lili Epps M.D.   125 mg at 07/08/24 2123    sodium chloride (Salt) tablet 1 g  1 g Oral Q8HRS Lili Epps M.D.   1 g at 07/08/24 2123    midodrine (Proamatine) tablet 5 mg  5 mg Oral Q8HRS Jeremy M Gonda, M.D.   5 mg at 07/08/24 1448    hydroxychloroquine (Plaquenil) tablet 400 mg  400 mg Oral Once per day on Monday Tuesday Wednesday Thursday Friday Lili Epps M.D.   400 mg at 07/08/24 1448    And    [START ON 7/13/2024] hydroxychloroquine (Plaquenil) tablet 200 mg  200 mg Oral Once per day on Sunday Saturday Lili Epps M.D.        3% sodium chloride (Hypertonic Saline) 500mL infusion  0-60 mL/hr Intravenous Continuous Jeremy M Gonda, M.D. 75 mL/hr at 07/09/24 0254 75 mL/hr at 07/09/24 0254    ALPRAZolam (Xanax) tablet 0.5 mg  0.5 mg Oral TID PRN Jeremy M Gonda, M.D.   0.5 mg at 07/09/24 0332    MD Alert...ICU Electrolyte Replacement per Pharmacy   Other PHARMACY TO DOSE Jeremy M Gonda, M.D.        labetalol (Normodyne/Trandate) injection 10 mg  10 mg Intravenous Q10 MIN PRN Jeremy M Gonda, M.D.        hydrALAZINE (Apresoline) injection 10 mg  10 mg Intravenous Q2HRS PRN Jeremy M Gonda, M.D.        acetaminophen (Tylenol) tablet 650 mg  650 mg Oral Q6HRS PRN Jeremy M Gonda, M.D.   650 mg at 07/08/24 1751    ondansetron (Zofran) syringe/vial injection 4 mg  4 mg Intravenous Q4HRS PRN Jeremy M Gonda, M.D.   4 mg at 07/07/24 0336    ondansetron (Zofran ODT) dispertab 4 mg  4 mg Oral Q4HRS PRN Jeremy M Gonda, M.D.        promethazine (Phenergan) tablet 12.5-25 mg  12.5-25 mg Oral Q4HRS PRN Jeremy M Gonda, M.D.        promethazine (Phenergan)  suppository 12.5-25 mg  12.5-25 mg Rectal Q4HRS PRN Jeremy M Gonda, M.D.        prochlorperazine (Compazine) injection 5-10 mg  5-10 mg Intravenous Q4HRS PRN Jeremy M Gonda, M.D.   5 mg at 07/05/24 1322    amitriptyline (Elavil) tablet 100 mg  100 mg Oral QHS PRN Jeremy M Gonda, M.D.   100 mg at 07/07/24 2232    docusate sodium (Colace) capsule 100 mg  100 mg Oral BID Jeremy M Gonda, M.D.   100 mg at 07/08/24 1751    folic acid (Folvite) tablet 2 mg  2 mg Oral DAILY Jeremy M Gonda, M.D.   2 mg at 07/08/24 0529    gabapentin (Neurontin) capsule 300 mg  300 mg Oral BID Jeremy M Gonda, M.D.   300 mg at 07/08/24 1751    norepinephrine (Levophed) 8 mg in 250 mL NS infusion (premix)  0-1 mcg/kg/min Intravenous Continuous Jeremy M Gonda, M.D. 3.1 mL/hr at 07/08/24 0524 0.02 mcg/kg/min at 07/08/24 0524    rosuvastatin (Crestor) tablet 5 mg  5 mg Oral Q EVENING Jeremy M Gonda, M.D.   5 mg at 07/08/24 1751    enoxaparin (Lovenox) inj 80 mg  1 mg/kg Subcutaneous Q12HRS Jeremy M Gonda, M.D.   80 mg at 07/08/24 1751    aspirin EC tablet 81 mg  81 mg Oral DAILY Jeremy M Gonda, M.D.   81 mg at 07/08/24 0529    HYDROcodone-acetaminophen (Norco) 5-325 MG per tablet 1 Tablet  1 Tablet Oral Q8HRS PRN Jeremy M Gonda, M.D.   1 Tablet at 07/09/24 0332       Fluids    Intake/Output Summary (Last 24 hours) at 7/9/2024 0614  Last data filed at 7/9/2024 0400  Gross per 24 hour   Intake 2124.59 ml   Output 2140 ml   Net -15.41 ml       Laboratory          Recent Labs     07/07/24  0219 07/07/24  0815 07/08/24  0250 07/08/24  0938 07/08/24  1635 07/09/24  0006 07/09/24  0503   SODIUM 144   < > 146*   < > 145 143 143   POTASSIUM 3.9  --  3.9  --   --   --  3.6   CHLORIDE 108  --  112  --   --   --  112   CO2 23  --  20  --   --   --  20   BUN 3*  --  3*  --   --   --  2*   CREATININE 0.56  --  0.56  --   --   --  0.36*   MAGNESIUM 1.8  --  2.0  --   --   --  1.7   CALCIUM 8.4*  --  8.5  --   --   --  8.2*    < > = values in this interval not  displayed.     Recent Labs     07/07/24 0219 07/08/24  0250 07/09/24  0503   GLUCOSE 134* 123* 123*     Recent Labs     07/07/24 0219 07/08/24  0250 07/09/24  0503   WBC 15.3* 14.1* 7.5   NEUTSPOLYS 69.60 72.90* 67.10   LYMPHOCYTES 19.00* 18.90* 20.70*   MONOCYTES 10.70 7.00 6.50   EOSINOPHILS 0.10 0.60 4.80   BASOPHILS 0.30 0.30 0.50     Recent Labs     07/07/24  0219 07/08/24 0250 07/08/24  0938 07/09/24  0503   RBC 3.23* 3.39*  --  3.00*   HEMOGLOBIN 10.6* 10.8*  --  9.9*   HEMATOCRIT 31.4* 33.0*  --  29.7*   PLATELETCT 502* 453*  --  370   IRON  --   --  29*  --    TOTIRONBC  --   --  252  --        Imaging  CT:    Reviewed    Assessment/Plan  * Acute ischemic right MCA stroke (HCC)- (present on admission)  Assessment & Plan  With M2 occlusion s/p TNK 7/5 at 0528, attempted thrombectomy at 0725 with no flow and right distal ICA dissection  Continue systemic anticoagulation with Lovenox, eventual transition to coumadin vs NOAC (heme consulted) Continue antiplatelet with aspirin  Neurology, NSG and neuro IR consulted, prophylactic hemicraniectomy watch for now  Continue statin  Goal -160 with augmentation titrating norepinephrine drip  I am actively titrating 3% for Na goal of 150-160, salt tabs increased to 3g every 8 hours  Hematology consulted given hypercoagulable condition, hypercoagulable workup pending  PT/OT/SLP       Anemia  Assessment & Plan  Likely due to hematoma around arterial puncture site - worsening  Daily CBC with conservative transfusion strategy    Hyperglycemia  Assessment & Plan  Improved  Discontinue insulin sliding scale    Hypokalemia  Assessment & Plan  Replete again today    Hypomagnesemia  Assessment & Plan  Replete with MgSO4 2g IV today    SLE (systemic lupus erythematosus related syndrome) (HCC)- (present on admission)  Assessment & Plan  Followed by Dr. Morales for Lupus, RA, and thrombocytosis  Continue hydroxychloroquine and will stop Rinvoq due to increased cardiovascular  phenomenon   Hematology consulted    Thrombocytosis- (present on admission)  Assessment & Plan  Chronic, followed by outpatient hematology in [place].    Mixed dyslipidemia- (present on admission)  Assessment & Plan  Continue rosuvastatin    Anxiety- (present on admission)  Assessment & Plan  History of chronic anxiety treated with Ativan, gabapentin  Continue gabapentin, qhs amitriptyline, and as needed xanax today  Reassurance  Improved and off Precedex         VTE:  Lovenox  Ulcer: Not Indicated  Lines: Central Line  Ongoing indication addressed and Nunez Catheter  Ongoing indication addressed    I have performed a physical exam and reviewed and updated ROS and Plan today (7/9/2024). In review of yesterday's note (7/8/2024), there are no changes except as documented above.     The patient remains critically ill today requiring active titration of hypertonic saline for sodium goals as well as frequent neurochecks every 2 hours.  I have assessed and reassessed the patient's titratable drips, neurological status, respiratory status, and hemodynamics.  The patient remains at high risk of clinical deterioration, worsening vital organ dysfunction, and death without the above critical care interventions.    Discussed patient condition and risk of morbidity and/or mortality with RN, RT, Pharmacy, Charge nurse / hot rounds, Patient, and neurology and neurosurgery. Critical care time = 105 minutes in directly providing and coordinating critical care and extensive data review.  No time overlap and excludes procedures.

## 2024-07-10 LAB
ANION GAP SERPL CALC-SCNC: 9 MMOL/L (ref 7–16)
BASOPHILS # BLD AUTO: 0.3 % (ref 0–1.8)
BASOPHILS # BLD: 0.03 K/UL (ref 0–0.12)
BUN SERPL-MCNC: 3 MG/DL (ref 8–22)
CALCIUM SERPL-MCNC: 8.2 MG/DL (ref 8.5–10.5)
CHLORIDE SERPL-SCNC: 117 MMOL/L (ref 96–112)
CO2 SERPL-SCNC: 21 MMOL/L (ref 20–33)
CREAT SERPL-MCNC: 0.39 MG/DL (ref 0.5–1.4)
EKG IMPRESSION: NORMAL
EOSINOPHIL # BLD AUTO: 0.22 K/UL (ref 0–0.51)
EOSINOPHIL NFR BLD: 2.5 % (ref 0–6.9)
ERYTHROCYTE [DISTWIDTH] IN BLOOD BY AUTOMATED COUNT: 48.6 FL (ref 35.9–50)
GFR SERPLBLD CREATININE-BSD FMLA CKD-EPI: 132 ML/MIN/1.73 M 2
GLUCOSE SERPL-MCNC: 114 MG/DL (ref 65–99)
HCT VFR BLD AUTO: 30.4 % (ref 37–47)
HCT VFR BLD AUTO: 34.9 % (ref 37–47)
HGB BLD-MCNC: 11.4 G/DL (ref 12–16)
HGB BLD-MCNC: 9.9 G/DL (ref 12–16)
IMM GRANULOCYTES # BLD AUTO: 0.04 K/UL (ref 0–0.11)
IMM GRANULOCYTES NFR BLD AUTO: 0.5 % (ref 0–0.9)
LYMPHOCYTES # BLD AUTO: 1.42 K/UL (ref 1–4.8)
LYMPHOCYTES NFR BLD: 16 % (ref 22–41)
MAGNESIUM SERPL-MCNC: 1.9 MG/DL (ref 1.5–2.5)
MCH RBC QN AUTO: 31.9 PG (ref 27–33)
MCHC RBC AUTO-ENTMCNC: 32.6 G/DL (ref 32.2–35.5)
MCV RBC AUTO: 98.1 FL (ref 81.4–97.8)
MONOCYTES # BLD AUTO: 0.6 K/UL (ref 0–0.85)
MONOCYTES NFR BLD AUTO: 6.8 % (ref 0–13.4)
NEUTROPHILS # BLD AUTO: 6.56 K/UL (ref 1.82–7.42)
NEUTROPHILS NFR BLD: 73.9 % (ref 44–72)
NRBC # BLD AUTO: 0.04 K/UL
NRBC BLD-RTO: 0.5 /100 WBC (ref 0–0.2)
PLATELET # BLD AUTO: 420 K/UL (ref 164–446)
PMV BLD AUTO: 9.4 FL (ref 9–12.9)
POTASSIUM SERPL-SCNC: 4.1 MMOL/L (ref 3.6–5.5)
RBC # BLD AUTO: 3.1 M/UL (ref 4.2–5.4)
SODIUM SERPL-SCNC: 142 MMOL/L (ref 135–145)
SODIUM SERPL-SCNC: 143 MMOL/L (ref 135–145)
SODIUM SERPL-SCNC: 146 MMOL/L (ref 135–145)
SODIUM SERPL-SCNC: 147 MMOL/L (ref 135–145)
WBC # BLD AUTO: 8.9 K/UL (ref 4.8–10.8)

## 2024-07-10 PROCEDURE — 700102 HCHG RX REV CODE 250 W/ 637 OVERRIDE(OP): Performed by: INTERNAL MEDICINE

## 2024-07-10 PROCEDURE — 85025 COMPLETE CBC W/AUTO DIFF WBC: CPT

## 2024-07-10 PROCEDURE — 700101 HCHG RX REV CODE 250: Performed by: INTERNAL MEDICINE

## 2024-07-10 PROCEDURE — 80048 BASIC METABOLIC PNL TOTAL CA: CPT

## 2024-07-10 PROCEDURE — 97140 MANUAL THERAPY 1/> REGIONS: CPT

## 2024-07-10 PROCEDURE — 85014 HEMATOCRIT: CPT

## 2024-07-10 PROCEDURE — 700111 HCHG RX REV CODE 636 W/ 250 OVERRIDE (IP): Mod: JZ | Performed by: INTERNAL MEDICINE

## 2024-07-10 PROCEDURE — 99232 SBSQ HOSP IP/OBS MODERATE 35: CPT | Performed by: PSYCHIATRY & NEUROLOGY

## 2024-07-10 PROCEDURE — A9270 NON-COVERED ITEM OR SERVICE: HCPCS | Performed by: INTERNAL MEDICINE

## 2024-07-10 PROCEDURE — 99291 CRITICAL CARE FIRST HOUR: CPT | Performed by: INTERNAL MEDICINE

## 2024-07-10 PROCEDURE — 84295 ASSAY OF SERUM SODIUM: CPT | Mod: 91

## 2024-07-10 PROCEDURE — 93010 ELECTROCARDIOGRAM REPORT: CPT | Performed by: INTERNAL MEDICINE

## 2024-07-10 PROCEDURE — 770022 HCHG ROOM/CARE - ICU (200)

## 2024-07-10 PROCEDURE — 700111 HCHG RX REV CODE 636 W/ 250 OVERRIDE (IP): Performed by: INTERNAL MEDICINE

## 2024-07-10 PROCEDURE — 85018 HEMOGLOBIN: CPT

## 2024-07-10 PROCEDURE — 700105 HCHG RX REV CODE 258: Performed by: INTERNAL MEDICINE

## 2024-07-10 PROCEDURE — 83735 ASSAY OF MAGNESIUM: CPT

## 2024-07-10 PROCEDURE — 93005 ELECTROCARDIOGRAM TRACING: CPT | Performed by: INTERNAL MEDICINE

## 2024-07-10 PROCEDURE — 97530 THERAPEUTIC ACTIVITIES: CPT

## 2024-07-10 PROCEDURE — 99233 SBSQ HOSP IP/OBS HIGH 50: CPT | Performed by: PHYSICAL MEDICINE & REHABILITATION

## 2024-07-10 PROCEDURE — 97110 THERAPEUTIC EXERCISES: CPT

## 2024-07-10 PROCEDURE — 99292 CRITICAL CARE ADDL 30 MIN: CPT | Performed by: INTERNAL MEDICINE

## 2024-07-10 RX ORDER — ALPRAZOLAM 0.5 MG
0.5 TABLET ORAL EVERY 4 HOURS PRN
Status: DISCONTINUED | OUTPATIENT
Start: 2024-07-10 | End: 2024-07-15 | Stop reason: HOSPADM

## 2024-07-10 RX ORDER — BACLOFEN 10 MG/1
5 TABLET ORAL 3 TIMES DAILY
Status: DISCONTINUED | OUTPATIENT
Start: 2024-07-10 | End: 2024-07-15 | Stop reason: HOSPADM

## 2024-07-10 RX ORDER — QUETIAPINE FUMARATE 25 MG/1
25 TABLET, FILM COATED ORAL NIGHTLY
Status: DISCONTINUED | OUTPATIENT
Start: 2024-07-10 | End: 2024-07-11

## 2024-07-10 RX ORDER — MAGNESIUM SULFATE HEPTAHYDRATE 40 MG/ML
2 INJECTION, SOLUTION INTRAVENOUS ONCE
Status: COMPLETED | OUTPATIENT
Start: 2024-07-10 | End: 2024-07-10

## 2024-07-10 RX ADMIN — GABAPENTIN 300 MG: 300 CAPSULE ORAL at 17:36

## 2024-07-10 RX ADMIN — ASPIRIN 81 MG: 81 TABLET, COATED ORAL at 06:18

## 2024-07-10 RX ADMIN — BACLOFEN 5 MG: 10 TABLET ORAL at 17:36

## 2024-07-10 RX ADMIN — HYDRALAZINE HYDROCHLORIDE 10 MG: 20 INJECTION, SOLUTION INTRAMUSCULAR; INTRAVENOUS at 18:04

## 2024-07-10 RX ADMIN — MAGNESIUM SULFATE HEPTAHYDRATE 2 G: 2 INJECTION, SOLUTION INTRAVENOUS at 09:26

## 2024-07-10 RX ADMIN — SODIUM CHLORIDE 60 ML/HR: 3 INJECTION, SOLUTION INTRAVENOUS at 09:36

## 2024-07-10 RX ADMIN — SODIUM CHLORIDE 3 G: 1 TABLET ORAL at 06:18

## 2024-07-10 RX ADMIN — DEXMEDETOMIDINE 1.5 MCG/KG/HR: 100 INJECTION, SOLUTION INTRAVENOUS at 05:42

## 2024-07-10 RX ADMIN — FOLIC ACID 2 MG: 1 TABLET ORAL at 06:18

## 2024-07-10 RX ADMIN — SODIUM CHLORIDE 3 G: 1 TABLET ORAL at 21:34

## 2024-07-10 RX ADMIN — FLUDROCORTISONE ACETATE 0.1 MG: 0.1 TABLET ORAL at 17:38

## 2024-07-10 RX ADMIN — HYDROCODONE BITARTRATE AND ACETAMINOPHEN 1 TABLET: 5; 325 TABLET ORAL at 21:34

## 2024-07-10 RX ADMIN — FLUDROCORTISONE ACETATE 0.1 MG: 0.1 TABLET ORAL at 06:19

## 2024-07-10 RX ADMIN — QUETIAPINE FUMARATE 25 MG: 25 TABLET ORAL at 21:34

## 2024-07-10 RX ADMIN — SODIUM CHLORIDE 70 ML/HR: 3 INJECTION, SOLUTION INTRAVENOUS at 02:30

## 2024-07-10 RX ADMIN — BACLOFEN 5 MG: 10 TABLET ORAL at 13:18

## 2024-07-10 RX ADMIN — ROSUVASTATIN CALCIUM 5 MG: 5 TABLET, FILM COATED ORAL at 17:38

## 2024-07-10 RX ADMIN — SODIUM CHLORIDE 3 G: 1 TABLET ORAL at 15:20

## 2024-07-10 RX ADMIN — SODIUM CHLORIDE 250 MG: 9 INJECTION, SOLUTION INTRAVENOUS at 17:44

## 2024-07-10 RX ADMIN — GABAPENTIN 300 MG: 300 CAPSULE ORAL at 06:18

## 2024-07-10 RX ADMIN — SODIUM CHLORIDE 250 MG: 9 INJECTION, SOLUTION INTRAVENOUS at 06:17

## 2024-07-10 RX ADMIN — LABETALOL HYDROCHLORIDE 10 MG: 5 INJECTION, SOLUTION INTRAVENOUS at 19:13

## 2024-07-10 RX ADMIN — HYDROXYCHLOROQUINE SULFATE 400 MG: 200 TABLET ORAL at 06:18

## 2024-07-10 RX ADMIN — ENOXAPARIN SODIUM 80 MG: 100 INJECTION SUBCUTANEOUS at 17:36

## 2024-07-10 RX ADMIN — ENOXAPARIN SODIUM 80 MG: 100 INJECTION SUBCUTANEOUS at 06:18

## 2024-07-10 ASSESSMENT — GAIT ASSESSMENTS: GAIT LEVEL OF ASSIST: UNABLE TO PARTICIPATE

## 2024-07-10 ASSESSMENT — COGNITIVE AND FUNCTIONAL STATUS - GENERAL
TURNING FROM BACK TO SIDE WHILE IN FLAT BAD: A LOT
SUGGESTED CMS G CODE MODIFIER MOBILITY: CL
MOVING FROM LYING ON BACK TO SITTING ON SIDE OF FLAT BED: A LOT
CLIMB 3 TO 5 STEPS WITH RAILING: TOTAL
MOVING TO AND FROM BED TO CHAIR: A LOT
MOBILITY SCORE: 10
SUGGESTED CMS G CODE MODIFIER MOBILITY: CL
MOVING TO AND FROM BED TO CHAIR: A LOT
TURNING FROM BACK TO SIDE WHILE IN FLAT BAD: A LOT
WALKING IN HOSPITAL ROOM: TOTAL
MOVING FROM LYING ON BACK TO SITTING ON SIDE OF FLAT BED: A LOT
CLIMB 3 TO 5 STEPS WITH RAILING: TOTAL
STANDING UP FROM CHAIR USING ARMS: A LOT
STANDING UP FROM CHAIR USING ARMS: A LOT
WALKING IN HOSPITAL ROOM: TOTAL
MOBILITY SCORE: 10

## 2024-07-10 ASSESSMENT — ENCOUNTER SYMPTOMS
FEVER: 0
SHORTNESS OF BREATH: 0
SPEECH CHANGE: 0
NERVOUS/ANXIOUS: 1
BACK PAIN: 0
NAUSEA: 0
SORE THROAT: 0
COUGH: 0
MYALGIAS: 0
INSOMNIA: 1
FLANK PAIN: 0
BRUISES/BLEEDS EASILY: 0
DIZZINESS: 0
SENSORY CHANGE: 1
HEADACHES: 1
FOCAL WEAKNESS: 1
ABDOMINAL PAIN: 0
DOUBLE VISION: 0
DEPRESSION: 0
DIARRHEA: 0

## 2024-07-10 ASSESSMENT — PAIN DESCRIPTION - PAIN TYPE
TYPE: ACUTE PAIN

## 2024-07-10 NOTE — PROGRESS NOTES
"    Physical Medicine and Rehabilitation Consultation              Date of initial consultation: 7/6/2024  Consulting provider: Jeremy M Gonda, M.D.   Reason for consultation: assess for acute inpatient rehab appropriateness  LOS: 5 Day(s)    Chief complaint: Right MCA syndrome    HPI: Per Dr. Lovell's prior consult note:   \" The patient is a 33 y.o. 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  Patient was [place] [facility], symptoms resolved, she underwent CT head which was normal.  CT angiogram showed a right M3 occlusion.  No thrombolytic therapy was given due to return to baseline.  Patient was transferred to [facility], redeveloped left facial droop, left arm weakness, left-sided neglect.  NIH score 4.  CT perfusion found 23 mL penumbra.  Patient was treated with IV TNK and went for thrombectomy which was successful in establishing TICI 2C reperfusion.  Follow-up MR brain found evolving right MCA stroke with petechial hemorrhagic conversion  The patient currently reports lethargy.  Patient has dysarthria.  She has a hard time staying awake during examination.  Patient is off to CT scan shortly after my exam concludes.  She reports no prior functional deficit.  Exam is significant for lack of motor control on the left with significant tone in the upper and lower extremity.  Patient has difficulty with fine motor coordination on the right.\"    7/10: seen for follow up, patient having anxiety and restlessness requiring precedex drip.   Patient reports sore throat and fatigue. Cooperative with exam but keeps eyes closed. becomes irritated with exam on LUE due to increased tone and discomfort with with ROM.     ROS  Pertinent positives are mentioned in the HPI, all others reviewed and are negative.    Social Hx:  2 SH  2 SUSY  With: " Mother    THERAPY:  Restrictions: Fall risk, swallow ppx  PT: Functional mobility   7/9 Max A bed mobility, Max A x 2 people for sit to stand, unable to participate in transfers     OT: ADLs  7/8  Max A upper body dressing, Total A lower body dressing     SLP:   7/9  on minced and moist solids with thins; severe memory impairment     IMAGING:  MRI brain 7/5/2024    Acute infarct in the right frontoparietal region and right insula predominantly involving the cortex. Acute infarct also noted in the right caudate and putamen.     Minimal subarachnoid hemorrhage noted in the sylvian fissure and the sulci over the convexity, likely procedure related.     No midline shift or significant mass effect.    PROCEDURES:  Dr. Kavita Matson MD 7/5/2024  Cerebral angiogram and mechanical thrombectomy   Right MCA M2/3 parietal branch occlusion. Final recanalization score TICI 2C.     PMH:  Past Medical History:   Diagnosis Date    Abdominal pain, left upper quadrant     Anxiety     Bronchitis     Chest pain     Cough     Depression     Early satiety     Gastritis     Helicobacter pylori (H. pylori)     Jaw pain     Migraine     Musculoskeletal pain     Back    Otitis media     Right wrist pain     Sinus disorder     Sinusitis     SOB (shortness of breath)     Thrombocytosis     Tonsillitis     URI (upper respiratory infection)     Yeast infection        PSH:  Past Surgical History:   Procedure Laterality Date    ABDOMINAL EXPLORATION         FHX:  Family History   Problem Relation Age of Onset    Diabetes Mother     Hypertension Father     Diabetes Maternal Grandmother     Heart Disease Maternal Grandmother     Diabetes Paternal Grandmother     Diabetes Paternal Grandfather     Cancer Paternal Grandfather        Medications:  Current Facility-Administered Medications   Medication Dose    magnesium sulfate IVPB premix 2 g  2 g    QUEtiapine (SEROquel) tablet 25 mg  25 mg    ALPRAZolam (Xanax) tablet 0.5 mg  0.5 mg    sodium  "chloride (Salt) tablet 3 g  3 g    ferric gluconate complex (Ferrlecit) 250 mg in  mL IVPB  250 mg    fludrocortisone (Florinef) tablet 0.1 mg  0.1 mg    haloperidol lactate (Haldol) injection 1 mg  1 mg    dexmedetomidine (Precedex) 400 mcg/100mL infusion  0.1-1.5 mcg/kg/hr (Order-Specific)    simethicone (Mylicon) chewable tablet 125 mg  125 mg    hydroxychloroquine (Plaquenil) tablet 400 mg  400 mg    And    [START ON 7/13/2024] hydroxychloroquine (Plaquenil) tablet 200 mg  200 mg    3% sodium chloride (Hypertonic Saline) 500mL infusion  0-80 mL/hr    MD Alert...ICU Electrolyte Replacement per Pharmacy      labetalol (Normodyne/Trandate) injection 10 mg  10 mg    hydrALAZINE (Apresoline) injection 10 mg  10 mg    acetaminophen (Tylenol) tablet 650 mg  650 mg    ondansetron (Zofran) syringe/vial injection 4 mg  4 mg    ondansetron (Zofran ODT) dispertab 4 mg  4 mg    promethazine (Phenergan) tablet 12.5-25 mg  12.5-25 mg    promethazine (Phenergan) suppository 12.5-25 mg  12.5-25 mg    prochlorperazine (Compazine) injection 5-10 mg  5-10 mg    amitriptyline (Elavil) tablet 100 mg  100 mg    docusate sodium (Colace) capsule 100 mg  100 mg    folic acid (Folvite) tablet 2 mg  2 mg    gabapentin (Neurontin) capsule 300 mg  300 mg    norepinephrine (Levophed) 8 mg in 250 mL NS infusion (premix)  0-1 mcg/kg/min    rosuvastatin (Crestor) tablet 5 mg  5 mg    enoxaparin (Lovenox) inj 80 mg  1 mg/kg    aspirin EC tablet 81 mg  81 mg    HYDROcodone-acetaminophen (Norco) 5-325 MG per tablet 1 Tablet  1 Tablet       Allergies:  Allergies   Allergen Reactions    Pcn [Penicillins] Rash    Augmentin Diarrhea     diarrhea    Zithromax [Azithromycin] Rash     Rash         Physical Exam:  Vitals: BP (!) 159/69   Pulse 66   Temp 35.9 °C (96.6 °F) (Temporal)   Resp 19   Ht 1.473 m (4' 10\")   Wt 80.9 kg (178 lb 5.6 oz)   SpO2 95%   Gen: NAD, laying comfortably in bed   Head: NC/AT  Eyes/ Nose/ Mouth: PERRLA, moist mucous " membranes  Cardio: RRR, good distal perfusion, warm extremities  Pulm: normal respiratory effort, no cyanosis, on RA   Abd: Soft NTND, negative borborygmi   Ext: No peripheral edema. No calf tenderness. No clubbing.    Mental status:  A&Ox4 (person, place, date, situation) answers questions appropriately  Speech: moderate dysarthria      Motor:      Upper Extremity  Myotome R L   Shoulder flexion C5 5/5 0/5   Elbow flexion C5 5/5 0/5   Wrist extension C6 5/5 0/5   Elbow extension C7 5/5 0/5   Finger flexion C8 5/5 0/5   Finger abduction T1 5/5 0/5     Lower Extremity Myotome R L   Hip flexion L2 5/5 0/5   Knee extension L3 5/5 0/5   Ankle dorsiflexion L4 5/5 0/5   Toe extension L5 5/5 0/5   Ankle plantarflexion S1 5/5 0/5     Sensory:   intact to light touch through out    Tone: MAS 2 tone in left upper and lower extremities     Coordination:   Altered fine motor with fingers on right, unable to test left.       Labs: Reviewed and significant for   Recent Labs     07/08/24  0250 07/08/24  0938 07/09/24  0503 07/10/24  0010 07/10/24  0558   RBC 3.39*  --  3.00*  --  3.10*   HEMOGLOBIN 10.8*  --  9.9* 11.4* 9.9*   HEMATOCRIT 33.0*  --  29.7* 34.9* 30.4*   PLATELETCT 453*  --  370  --  420   IRON  --  29*  --   --   --    TOTIRONBC  --  252  --   --   --      Recent Labs     07/08/24  0250 07/08/24  0938 07/09/24  0503 07/09/24  0643 07/09/24  1740 07/10/24  0010 07/10/24  0558   SODIUM 146*   < > 143   < > 142 146* 147*   POTASSIUM 3.9  --  3.6  --   --   --  4.1   CHLORIDE 112  --  112  --   --   --  117*   CO2 20  --  20  --   --   --  21   GLUCOSE 123*  --  123*  --   --   --  114*   BUN 3*  --  2*  --   --   --  3*   CREATININE 0.56  --  0.36*  --   --   --  0.39*   CALCIUM 8.5  --  8.2*  --   --   --  8.2*    < > = values in this interval not displayed.     Recent Results (from the past 24 hour(s))   SODIUM SERUM (NA)    Collection Time: 07/09/24 12:10 PM   Result Value Ref Range    Sodium 143 135 - 145 mmol/L    SODIUM SERUM (NA)    Collection Time: 07/09/24  5:40 PM   Result Value Ref Range    Sodium 142 135 - 145 mmol/L   SODIUM SERUM (NA)    Collection Time: 07/10/24 12:10 AM   Result Value Ref Range    Sodium 146 (H) 135 - 145 mmol/L   HEMOGLOBIN AND HEMATOCRIT    Collection Time: 07/10/24 12:10 AM   Result Value Ref Range    Hemoglobin 11.4 (L) 12.0 - 16.0 g/dL    Hematocrit 34.9 (L) 37.0 - 47.0 %   CBC with Differential    Collection Time: 07/10/24  5:58 AM   Result Value Ref Range    WBC 8.9 4.8 - 10.8 K/uL    RBC 3.10 (L) 4.20 - 5.40 M/uL    Hemoglobin 9.9 (L) 12.0 - 16.0 g/dL    Hematocrit 30.4 (L) 37.0 - 47.0 %    MCV 98.1 (H) 81.4 - 97.8 fL    MCH 31.9 27.0 - 33.0 pg    MCHC 32.6 32.2 - 35.5 g/dL    RDW 48.6 35.9 - 50.0 fL    Platelet Count 420 164 - 446 K/uL    MPV 9.4 9.0 - 12.9 fL    Neutrophils-Polys 73.90 (H) 44.00 - 72.00 %    Lymphocytes 16.00 (L) 22.00 - 41.00 %    Monocytes 6.80 0.00 - 13.40 %    Eosinophils 2.50 0.00 - 6.90 %    Basophils 0.30 0.00 - 1.80 %    Immature Granulocytes 0.50 0.00 - 0.90 %    Nucleated RBC 0.50 (H) 0.00 - 0.20 /100 WBC    Neutrophils (Absolute) 6.56 1.82 - 7.42 K/uL    Lymphs (Absolute) 1.42 1.00 - 4.80 K/uL    Monos (Absolute) 0.60 0.00 - 0.85 K/uL    Eos (Absolute) 0.22 0.00 - 0.51 K/uL    Baso (Absolute) 0.03 0.00 - 0.12 K/uL    Immature Granulocytes (abs) 0.04 0.00 - 0.11 K/uL    NRBC (Absolute) 0.04 K/uL   Basic Metabolic Panel (BMP)    Collection Time: 07/10/24  5:58 AM   Result Value Ref Range    Sodium 147 (H) 135 - 145 mmol/L    Potassium 4.1 3.6 - 5.5 mmol/L    Chloride 117 (H) 96 - 112 mmol/L    Co2 21 20 - 33 mmol/L    Glucose 114 (H) 65 - 99 mg/dL    Bun 3 (L) 8 - 22 mg/dL    Creatinine 0.39 (L) 0.50 - 1.40 mg/dL    Calcium 8.2 (L) 8.5 - 10.5 mg/dL    Anion Gap 9.0 7.0 - 16.0   Magnesium    Collection Time: 07/10/24  5:58 AM   Result Value Ref Range    Magnesium 1.9 1.5 - 2.5 mg/dL   ESTIMATED GFR    Collection Time: 07/10/24  5:58 AM   Result Value Ref Range     GFR (CKD-EPI) 132 >60 mL/min/1.73 m 2         ASSESSMENT:  Patient is a 33 y.o. female admitted with right M3 occlusion, status post TNK, TICI 2C reperfusion    Rehabilitation: Impaired ADLs and mobility  Barriers to transfer include: Insurance authorization, TCCs to verify disposition, medical clearance and bed availability. All cases are subject to administrative review.     [facility] Code / Diagnosis to Support: 0001.1 - Stroke: Left Body Involvement (Right Brain)    Disposition recommendations:  - patient is currently functioning below their level of baseline, recommend post acute rehab  - recommend IRF level therapy with 3hr of therapy 5 days per week ONLY IF patient has 25/7 support with heavy physical assistance from mother   - piror to acceptance to IRF, will need insurance auth  - TCC to assist with insurance auth and DC support from mother       Medical Complexity:    Large R MCA stroke   - Secondary to Right M3 occlusion, now s/p TNK and thrombectomy with TICI 2C reperfusion  - Anticoagulation with Lovenox 80mg BID   - Daily ASA  - on hypertonic saline for goal Na 150-160   - continue with PT/OT/SLP     Autoimmune disorder   SLE   -followed by Dr. Roman for Lupus, RA, and thrombocytosis   - on plaquenil     Spasticity   - was preciously on baclofen 5 TID, but was stopped on 7/7  - recommend restarting anti spasticity meds to help with tone and assist with her agitation , notified Dr. Epps     Hypertension   - -160 goal per neuro   - on florinef     Agitation  - required precedex drip   - on scheduled Seroquel 25mg qhs     Neuropathic pain   - Gabapentin 300mg BID     Lethargy   - Avoid benzodiazepines     DVT PPX: Lovenox       Thank you for allowing us to participate in the care of this patient.     Patient was seen for 51  minutes on unit/floor of which > 50% of time was spent on counseling and coordination of care regarding the above, including prognosis, risk reduction, benefits of treatment,  and options for next stage of care.    Tamie Haider D.O.  Physical Medicine and Rehabilitation     Please note that this dictation was created using voice recognition software. I have made every reasonable attempt to correct obvious errors, but there may be errors of grammar and possibly content that I did not discover before finalizing the note.

## 2024-07-10 NOTE — THERAPY
Physical Therapy   Daily Treatment     Patient Name: Ellie Sánchez  Age:  33 y.o., Sex:  female  Medical Record #: [id number]  Today's Date: 7/10/2024     Precautions  Precautions: Fall Risk;Other (See Comments)  Comments: SBP goal: 110-160; L side deficits    Assessment    Patient seen for PT treatment session. Patient in bed, agreeable for the session. Able to participate bed mobility, sit-stand, BSC transfer, chair transfer as detailed below. Will continue to benefit from PT services and recommend post-acute placement at this time    Plan    Treatment Plan Status: Continue Current Treatment Plan  Type of Treatment: Bed Mobility, Equipment, Family / Caregiver Training, Neuro Re-Education / Balance, Therapeutic Activities, Therapeutic Exercise  Treatment Frequency: 5 Times per Week  Treatment Duration: Until Therapy Goals Met    DC Equipment Recommendations: Unable to determine at this time  Discharge Recommendations: Recommend post-acute placement for additional physical therapy services prior to discharge home    Objective     07/10/24 1434   Precautions   Precautions Fall Risk;Other (See Comments)   Comments SBP goal: 110-160; L side deficits   Vitals   Pulse 97   Patient BP Position Caldwell's Position   Blood Pressure 123/57   Pulse Oximetry 98 %   O2 Delivery Device None - Room Air   Vitals Comments Post activity, seated in the chair: 185/83, 91, 97   Pain   Pain Scales 0 to 10 Scale    Intervention Medication (see MAR);Repositioned;Rest   Pain 0 - 10 Group   Location Shoulder   Location Orientation Left   Therapist Pain Assessment During Activity   Cognition    Cognition / Consciousness X   Level of Consciousness Alert   Ability To Follow Commands 1 Step   Neuro-Muscular Treatments   Neuro-Muscular Treatments Anterior weight shift;Postural Changes;Postural Facilitation;Verbal Cuing   Comments Seated EOB; Standing within Adriana Steady with RUE on the bar in-front   Other Treatments   Other Treatments  Provided Patient was assisted to BSC per patient's request. Was assisted with mirtha-care post small BM. Educated patient's family regarding appropriate position of L shoulder/ LUE and possible reason for L shoulder pain. Patient's family stayed for part of the session and left the room mid-session.   Balance   Sitting Balance (Static) Poor   Sitting Balance (Dynamic) Trace +   Standing Balance (Static) Poor -   Standing Balance (Dynamic) Trace +   Weight Shift Sitting Poor   Weight Shift Standing Poor   Skilled Intervention Verbal Cuing;Sequencing;Facilitation;Compensatory Strategies   Comments Seated EOB; Standing within Maulik Steady; Standing with 2 person assist   Bed Mobility    Supine to Sit Moderate Assist   Sit to Supine    Scooting Maximal Assist  (Scoot up in bed/ scoot to EOB)   Rolling Moderate Assist to Lt.   Skilled Intervention Verbal Cuing;Sequencing;Facilitation;Compensatory Strategies   Comments HOB flat, without use of rails, towards L side of the ; cues for sequencing of task   Gait Analysis   Gait Level Of Assist Unable to Participate   Functional Mobility   Sit to Stand Minimal Assist  (x2 person assist for safety)   Bed, Chair, Wheelchair Transfer Maximal Assist  (x2 person assist for safety)   Toilet Transfers Total Assist   Transfer Method Squat Pivot;Steady Pivot Lift   Mobility Bed-EOB-sit-stand with use of maulik steady-transfer to chair-sit-stand with maulik steady-transfer to BSC-sit-stand with 2 person assist (mirtha-care)-BSC-squat pivot transfer towards R side to chair   Skilled Intervention Verbal Cuing;Sequencing;Facilitation;Compensatory Strategies   Comments Use of Maulik Steady from the EOB since the bed is too high for patient's height, unable to reach her feet on the floor, to safely attempt squat pivot transfer from EOB. Cues for hand placement, LE placement and sequencing of task   6 Clicks Assessment - How much HELP from from another person do you currently need... (If the patient  hasn't done an activity recently, how much help from another person do you think he/she would need if he/she tried?)   Turning from your back to your side while in a flat bed without using bedrails? 2   Moving from lying on your back to sitting on the side of a flat bed without using bedrails? 2   Moving to and from a bed to a chair (including a wheelchair)? 2   Standing up from a chair using your arms (e.g., wheelchair, or bedside chair)? 2   Walking in hospital room? 1   Climbing 3-5 steps with a railing? 1   6 clicks Mobility Score 10   Activity Tolerance   Sitting in Chair ~ 35 minutes   Patient / Family Goals    Patient / Family Goal #1 To get stronger and return home   Goal #1 Outcome Progressing as expected   Short Term Goals    Short Term Goal # 1 Patient will perform supine-sit, sit-supine with HOB raised with use of bed rail with CGA in 6 visits to progress with bed mobility   Goal Outcome # 1 Progressing slower than expected   Short Term Goal # 2 Patient will perform sit-stand with LRAD with supervision in 6 visits to progress with functional mobility   Goal Outcome # 2 Progressing as expected   Short Term Goal # 3 Patient will perform chair transfers with LRAD with Min A in 6 visits to progress with OOB to chair for meals   Goal Outcome # 3 Progressing slower than expected   Short Term Goal # 4 Patient will sit EOB with Fair (-) balance, with CGA, for about 20 minutes to improve upright sitting and sitting balance.   Goal Outcome # 4 Progressing slower than expected   Physical Therapy Treatment Plan   Physical Therapy Treatment Plan Continue Current Treatment Plan   Anticipated Discharge Equipment and Recommendations   DC Equipment Recommendations Unable to determine at this time   Discharge Recommendations Recommend post-acute placement for additional physical therapy services prior to discharge home   Interdisciplinary Plan of Care Collaboration   IDT Collaboration with  Family /  Caregiver;Nursing;Therapy Tech;Occupational Therapist   Patient Position at End of Therapy Seated;Chair Alarm On;Call Light within Reach;Tray Table within Reach;Phone within Reach   Session Information   Date / Session Number  7/10-3(3/5, 7/14)

## 2024-07-10 NOTE — PROGRESS NOTES
Neurology Progress Note  Neurohospitalist Service, [facility]    Referring Physician: Lili Epps M.D.      Interval History: No acute events overnight.  On dex for some impulsivity/agitation.   SBPs in 140s-160s.      Review of systems: In addition to what is detailed in the HPI and/or updated in the interval history, all other systems reviewed and are negative.    Past Medical History, Past Surgical History and Social History reviewed and unchanged from prior    Medications:    Current Facility-Administered Medications:     sodium chloride (Salt) tablet 3 g, 3 g, Oral, Q8HRS, Lili Epps M.D., 3 g at 07/10/24 0618    ferric gluconate complex (Ferrlecit) 250 mg in  mL IVPB, 250 mg, Intravenous, BID, Lili Epps M.D., Last Rate: 100 mL/hr at 07/10/24 0617, 250 mg at 07/10/24 0617    fludrocortisone (Florinef) tablet 0.1 mg, 0.1 mg, Oral, BID, Enrrique Welsh M.D., 0.1 mg at 07/10/24 0619    haloperidol lactate (Haldol) injection 1 mg, 1 mg, Intravenous, Q4HRS PRN, Pete Dillard M.D., 1 mg at 07/09/24 2308    dexmedetomidine (Precedex) 400 mcg/100mL infusion, 0.1-1.5 mcg/kg/hr (Order-Specific), Intravenous, Continuous, Pete Dillard M.D., Last Rate: 15.2 mL/hr at 07/10/24 0616, 1.4 mcg/kg/hr at 07/10/24 0616    simethicone (Mylicon) chewable tablet 125 mg, 125 mg, Oral, TID PRN, Lili Epps M.D., 125 mg at 07/09/24 1934    hydroxychloroquine (Plaquenil) tablet 400 mg, 400 mg, Oral, Once per day on Monday Tuesday Wednesday Thursday Friday, 400 mg at 07/10/24 0618 **AND** [START ON 7/13/2024] hydroxychloroquine (Plaquenil) tablet 200 mg, 200 mg, Oral, Once per day on Sunday Saturday, Lili Epps M.D.    3% sodium chloride (Hypertonic Saline) 500mL infusion, 0-80 mL/hr, Intravenous, Continuous, Enrrique Welsh M.D., Last Rate: 80 mL/hr at 07/10/24 0701, 80 mL/hr at 07/10/24 0701    ALPRAZolam (Xanax) tablet 0.5 mg, 0.5 mg, Oral, TID PRN,  Jeremy M Gonda, M.D., 0.5 mg at 07/09/24 2352    MD Alert...ICU Electrolyte Replacement per Pharmacy, , Other, PHARMACY TO DOSE, Jeremy M Gonda, M.D.    labetalol (Normodyne/Trandate) injection 10 mg, 10 mg, Intravenous, Q10 MIN PRN, Jeremy M Gonda, M.D.    hydrALAZINE (Apresoline) injection 10 mg, 10 mg, Intravenous, Q2HRS PRN, Jeremy M Gonda, M.D.    acetaminophen (Tylenol) tablet 650 mg, 650 mg, Oral, Q6HRS PRN, Jeremy M Gonda, M.D., 650 mg at 07/09/24 1934    ondansetron (Zofran) syringe/vial injection 4 mg, 4 mg, Intravenous, Q4HRS PRN, Jeremy M Gonda, M.D., 4 mg at 07/07/24 0336    ondansetron (Zofran ODT) dispertab 4 mg, 4 mg, Oral, Q4HRS PRN, Jeremy M Gonda, M.D.    promethazine (Phenergan) tablet 12.5-25 mg, 12.5-25 mg, Oral, Q4HRS PRN, Jeremy M Gonda, M.D.    promethazine (Phenergan) suppository 12.5-25 mg, 12.5-25 mg, Rectal, Q4HRS PRN, Jeremy M Gonda, M.D.    prochlorperazine (Compazine) injection 5-10 mg, 5-10 mg, Intravenous, Q4HRS PRN, Jeremy M Gonda, M.D., 5 mg at 07/05/24 1322    amitriptyline (Elavil) tablet 100 mg, 100 mg, Oral, QHS PRN, Jeremy M Gonda, M.D., 100 mg at 07/09/24 2208    docusate sodium (Colace) capsule 100 mg, 100 mg, Oral, BID, Jeremy M Gonda, M.D., 100 mg at 07/09/24 0633    folic acid (Folvite) tablet 2 mg, 2 mg, Oral, DAILY, Jeremy M Gonda, M.D., 2 mg at 07/10/24 0618    gabapentin (Neurontin) capsule 300 mg, 300 mg, Oral, BID, Jeremy M Gonda, M.D., 300 mg at 07/10/24 0618    norepinephrine (Levophed) 8 mg in 250 mL NS infusion (premix), 0-1 mcg/kg/min, Intravenous, Continuous, Jeremy M Gonda, M.D., Last Rate: 3.1 mL/hr at 07/08/24 0524, 0.02 mcg/kg/min at 07/08/24 0524    rosuvastatin (Crestor) tablet 5 mg, 5 mg, Oral, Q EVENING, Jeremy M Gonda, M.D., 5 mg at 07/09/24 1733    enoxaparin (Lovenox) inj 80 mg, 1 mg/kg, Subcutaneous, Q12HRS, Jeremy M Gonda, M.D., 80 mg at 07/10/24 0618    aspirin EC tablet 81 mg, 81 mg, Oral, DAILY, Jeremy M Gonda, M.D., 81 mg at 07/10/24 0618     "HYDROcodone-acetaminophen (Norco) 5-325 MG per tablet 1 Tablet, 1 Tablet, Oral, Q8HRS PRN, Jeremy M Gonda, M.D., 1 Tablet at 07/09/24 2324    Physical Examination:   BP (!) 159/69   Pulse 66   Temp 35.9 °C (96.6 °F) (Temporal)   Resp 19   Ht 1.473 m (4' 10\")   Wt 80.9 kg (178 lb 5.6 oz)   LMP 06/12/2024 (Exact Date)   SpO2 95%   BMI 37.28 kg/m²       General: Patient is lethargic and in no acute distress  Neck: There is normal range of motion  CV: Regular rate   Extremities:  Warm, dry, and intact, without peripheral lower extremity edema    NEUROLOGICAL EXAM:     Mental status: Lethargic but arousable, afully oriented, follows commands  Speech and language: Speech is clear and fluent. The patient is able to name and repeat.  Cranial nerve exam: Visual fields are full. There is no nystagmus. Extraocular muscles are intact.  L face droop   Motor exam: There is sustained antigravity with no downward drift in R arm and leg.  There is no movement in the LUE.  LLE is briefly antigravity with drift to bed.  Sensory exam:  Reacts to tactile in all 4 extremities, however appears diminished on L side with associated neglect  Coordination: No ataxia on elicited movements      NIHSS: National Institutes of Health Stroke Scale    [0] 1a:Level of Consciousness    0-alert 1-drowsy   2-stupor   3-coma  [0] 1b:LOC Questions                  0-both  1-one      2-neither  [0] 1c:LOC Commands                   0-both  1-one      2-neither  [0] 2: Best Gaze                     0-nl    1-partial  2-forced  [0] 3: Visual Fields                   0-nl    1-partial  2-complete 3-bilat  [1] 4: Facial Paresis                0-nl    1-minor    2-partial  3-full  MOTOR                       0-nl  [0] 5: Right Arm           1-drift  [4] 6: Left Arm             2-some effort vs gravity  [0] 7: Right Leg           3-no effort vs gravity  [2] 8: Left Leg             4-no movement                             x-untestable  [0] 9: Limb Ataxia "                    0-abs   1-1_limb   2-2+_limbs       x-untestable  [1] 10:Sensory                        0-nl    1-partial  2-dense  [0] 11:Best Language/Aphasia         0-nl    1-mild/mod 2-severe   3-mute  [0] 12:Dysarthria                     0-nl    1-mild/mod 2-severe       x-untestable  [1] 13:Neglect/Inattention            0-none  1-partial  2-complete  [9] TOTAL      Objective Data:    Labs:  Lab Results   Component Value Date/Time    PROTHROMBTM 13.9 07/05/2024 09:50 AM    INR 1.06 07/05/2024 09:50 AM      Lab Results   Component Value Date/Time    WBC 8.9 07/10/2024 05:58 AM    RBC 3.10 (L) 07/10/2024 05:58 AM    HEMOGLOBIN 9.9 (L) 07/10/2024 05:58 AM    HEMATOCRIT 30.4 (L) 07/10/2024 05:58 AM    MCV 98.1 (H) 07/10/2024 05:58 AM    MCH 31.9 07/10/2024 05:58 AM    MCHC 32.6 07/10/2024 05:58 AM    MPV 9.4 07/10/2024 05:58 AM    NEUTSPOLYS 73.90 (H) 07/10/2024 05:58 AM    LYMPHOCYTES 16.00 (L) 07/10/2024 05:58 AM    MONOCYTES 6.80 07/10/2024 05:58 AM    EOSINOPHILS 2.50 07/10/2024 05:58 AM    BASOPHILS 0.30 07/10/2024 05:58 AM      Lab Results   Component Value Date/Time    SODIUM 147 (H) 07/10/2024 05:58 AM    POTASSIUM 4.1 07/10/2024 05:58 AM    CHLORIDE 117 (H) 07/10/2024 05:58 AM    CO2 21 07/10/2024 05:58 AM    GLUCOSE 114 (H) 07/10/2024 05:58 AM    BUN 3 (L) 07/10/2024 05:58 AM    CREATININE 0.39 (L) 07/10/2024 05:58 AM    BUNCREATRAT 18 05/26/2017 07:09 AM    GLOMRATE 95 10/31/2023 10:46 AM      Lab Results   Component Value Date/Time    CHOLSTRLTOT 194 07/06/2024 04:40 AM    LDL 81 07/06/2024 04:40 AM    HDL 70 07/06/2024 04:40 AM    TRIGLYCERIDE 217 (H) 07/06/2024 04:40 AM       Lab Results   Component Value Date/Time    ALKPHOSPHAT 70 07/05/2024 04:56 AM    ASTSGOT 25 07/05/2024 04:56 AM    ALTSGPT 16 07/05/2024 04:56 AM    TBILIRUBIN 0.3 07/05/2024 04:56 AM        Imaging/Testing:    I interpreted and/or reviewed the patient's neuroimaging    DX-SHOULDER 2+ LEFT   Final Result      1.  Normal  shoulder series.      CT-HEAD W/O   Final Result         1. Limited by motion.   2. No definite new abnormality.               DX-CHEST-LIMITED (1 VIEW)   Final Result         New right IJ central venous catheter is in good position without a pneumothorax.      CT-HEAD W/O   Final Result      Diffuse low-attenuation of the right hemisphere is consistent with a MCA territory infarct. Small areas of increased density within this region may represent petechial hemorrhages. There is effacement of the right lateral ventricle with approximately 3    mm of midline shift.               CT-CTA PELVIS WITH & W/O-POST PROCESS   Final Result      1.  No CTA evidence of active extravasation of contrast to suggest active bleeding.   2.  There is a subcutaneous hematoma in the right groin adjacent to the common femoral vessels.   3.  Contrast in the urinary bladder from the angiographic procedure earlier today.      EC-ECHOCARDIOGRAM COMPLETE W/O CONT   Final Result      MR-BRAIN-W/O   Final Result         Acute infarct in the right frontoparietal region and right insula predominantly involving the cortex. Acute infarct also noted in the right caudate and putamen.      Minimal subarachnoid hemorrhage noted in the sylvian fissure and the sulci over the convexity, likely procedure related.      No midline shift or significant mass effect.      IR-THROMBO MECHANICAL ARTERY,INIT   Final Result         33-year-old who presented with left-sided symptoms was found to have a right MCA M3 occlusion with a perfusion defect identified on CT perfusion imaging. Patient underwent emergent mechanical thrombectomy with multiple attempts to recanalize the parietal    M3 branch being unsuccessful. Final angiographic images demonstrate good antegrade flow in the MCA branches other than the occluded right MCA M3 parietal branch.      Short segment dissection of the right ICA just below the skull base was identified on the final angiogram.  Findings  discussed with Dr. Fernandez and given the nonprogressive nature of the dissection without flow limitation, the dissection will be managed by    initiation of antithrombotic/anticoagulation therapy.      Final recanalization score: TICI 2 C      I, Kavita Matson was physically present and participated during the entire procedure of the IR-THROMBO MECHANICAL ARTERY,INIT.                  DX-CHEST-PORTABLE (1 VIEW)   Final Result      Mild interstitial prominence could be related to hypoinflation. No consolidation or pleural effusion.      CT-CTA NECK WITH & W/O-POST PROCESSING   Final Result      CT angiogram of the neck within normal limits.      CT-CTA HEAD WITH & W/O-POST PROCESS   Final Result      Occlusion of the superior sylvian branch M2 segment RIGHT middle cerebral artery.      These findings were discussed with GAIL AGUIRRE II on 7/5/2024 5:33 AM.            CT-CEREBRAL PERFUSION ANALYSIS   Final Result      1. Cerebral blood flow less than 30% possibly representing completed infarct = 5 mL. Based on distribution of this finding, this is unlikely to represent artifact.      2. T Max more than 6 seconds possibly representing combination of completed infarct and ischemia = 28 mL. Based on the distribution of this finding, this is unlikely to represent artifact.      3. Mismatched volume possibly representing ischemic brain/penumbra= 23 mL      4.  Please note that this cerebral perfusion study and report is Quantitative and targets supratentorial (cerebral) perfusion for evaluation of large vessel territory acute ischemia/infarction. For example, lacunar infarcts, and brainstem/posterior fossa    ischemia/infarction are not evaluated on this study.  Data acquisition is subject to artifacts which can yield non-anatomically plausible perfusion maps which may be due to motion, bolus timing, signal to noise ratio, or other technical factors.    Perfusion map abnormalities which show non-anatomic  "distributions are likely artifact.   This study is not \"stand-alone\" and should only be utilized for diagnosis, management/treatment in correlation with CT, CTA, and/or MRI and clinical factors.             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  She is s/p TNK and mechanical thrombectomy, unfortunately with no significant reperfusion.  She has been started on anticoagulation for long-term stroke prevention.  Antiphospholipid antibodies are negative.  She is also s/p hypertonic saline course for mild mass effect seen on CT and MRI.   In addition, she has a R ICA dissection which is being treated with ASA therapy.  TTE without obvious embolic nidus.  I do think stroke etiology most likely related to intrinsic hypercoagulability related to her autoimmunity.  ICU team discussed with patient's rheumatologist- given increased stroke risk with rinvoq, will stop therapy.    Problem list:   R MCA stroke   R ICA dissection   Undefined autoimmune disorder    Plan:   - may transition to q4h neurochecks/NIHSS   - BP goal is 110-160/60-80- mild acute hypertensive response as there may be residual ischemic penumbra.  Wean florinef starting 7/20 for long-term goal 110-130/60-80   - we are now post-stroke day 5 and likely outside of peak edema window.  Normal Na goal 135-145- may wean 3% infusion   - continue therapeutic lovenox BID, when able transition to apixaban 5mg BID   - continue ASA therapy for total of three months to prevent thromboembolic events related to R ICA dissection (stop date is 10/5)   - complete embolic workup with ziopatch at discharge   - stroke labs:  HgbA1c 4.9, LDL 81   - may continue home rosuvastatin 5mg daily for goal LDL < 70   - PT/OT/SLP, physiatry consult   - on anticoagulation for DVT chemoppx   [facility]      The evaluation of the patient, and recommended " management, was discussed with Dr. Epps, ICU attending. I have performed a physical exam and reviewed and updated ROS and Plan today (7/10/2024).     Preet Fernandez MD  Vascular Neurology

## 2024-07-10 NOTE — DISCHARGE PLANNING
CM met with Brandi, Ellie's Mom.  Brandi shares with me that Ellie has been turned down twice for disability.  Brandi signed an R.O.I. and information was provided.  She is taking this to Ellie's PCP so she is able to complete the disability paperwork.    In visiting with Brandi, she has had time to digest the level of care that Ellie needs at this time.  Brandi, with a sad heart, says she would not be able to provide her care at this level.    We discussed SNF placement and referrals were sent to [place] and [place].  Brandi understands that this will allow Ellie to wrap her head around her difficulties and hopefully work to get to a higher level of independence. Brandi tells me that even wit Ellie's learning disability, she was a  until she was diagnoses with lupus and RA.    Brandi tells me that she is afraid for Ellie.  Emotional support provided.  I did discuss with Brandi that she allow Ellie to do as much as possible by herself.  I explained we all understand that it is difficult to watch someone struggle.  Brandi acknowledges that she has been feeding Ellie b/c when Ellie does it there is food all down the front of the towel she has placed for a bib.  She tells me she will begin encouraging Ellie to help herself.    Brandi looks exhausted, dark circles, heavy eyelids and states that she just wants to go home and sleep for a few hours, do laundry, care for the pets etc.  Permission given and Brandi was encouraged to take care of herself.  She feels guilty not being here. I explained that when Ellie is needing support to rehab and work through the daily struggles, she would need to be strong and rested to meet these needs.    She agreed to go home and get some rest.  I encouraged her to call any time and provided her with a RICU card and direct line.

## 2024-07-10 NOTE — PROGRESS NOTES
Neurosurgery Progress Note    Subjective:  No acute events    Exam:  Awake alert  Speech appropriate  Right  bicep iliopsoas dorsiflexion 5/5  Left upper extremity 0/5, IP dorsiflexion 4 -/5  Sensation present for touch bilateral upper lower extremities    BP  Min: 138/64  Max: 177/69  Pulse  Av.6  Min: 77  Max: 106  Resp  Av  Min: 15  Max: 60  Temp  Av.2 °C (97.2 °F)  Min: 35.8 °C (96.5 °F)  Max: 36.7 °C (98.1 °F)  SpO2  Av.2 %  Min: 92 %  Max: 99 %    No data recorded    Recent Labs     24  02124  0250 24  0503   WBC 15.3* 14.1* 7.5   RBC 3.23* 3.39* 3.00*   HEMOGLOBIN 10.6* 10.8* 9.9*   HEMATOCRIT 31.4* 33.0* 29.7*   MCV 97.2 97.3 99.0*   MCH 32.8 31.9 33.0   MCHC 33.8 32.7 33.3   RDW 48.7 48.9 50.5*   PLATELETCT 502* 453* 370   MPV 9.3 9.2 9.3     Recent Labs     24  0815 24  0250 24  0938 24  0503 24  0643 24  1210 24  1740   SODIUM 144   < > 146*   < > 143 143 143 142   POTASSIUM 3.9  --  3.9  --  3.6  --   --   --    CHLORIDE 108  --  112  --  112  --   --   --    CO2 23  --  20  --  20  --   --   --    GLUCOSE 134*  --  123*  --  123*  --   --   --    BUN 3*  --  3*  --  2*  --   --   --    CREATININE 0.56  --  0.56  --  0.36*  --   --   --    CALCIUM 8.4*  --  8.5  --  8.2*  --   --   --     < > = values in this interval not displayed.                 Intake/Output                         24 0700 - 24 - 07/10/24 0659      Total  Total                 Intake    P.O.  900  -- 900  826  200 1026    P.O. 900 -- 900     I.V.  604  765.4 1369.4  377.3  485.1 862.4    Magnesium Sulfate Volume -- -- -- 0 -- 0    Precedex Volume 54.2 -- 54.2 -- -- --    Volume (mL) (3% sodium chloride (Hypertonic Saline) 500mL infusion) 549.8 765.4 1315.2 377.3 485.1 862.4    IV Piggyback  --  -- --  0  -- 0    Volume (mL) (ferric gluconate complex  (Ferrlecit) 250 mg in  mL IVPB) -- -- -- 0 -- 0    Total Intake 1504 765.4 2269.4 1203.3 685.1 1888.4       Output    Urine  785  1655 2440  2100  575 2675    Output (mL) (Urethral Catheter) 785 1655 2440 2100 575 2675    Stool  --  -- --  --  -- --    Number of Times Stooled -- 1 x 1 x -- 1 x 1 x    Total Output 785 1655 2440 2100 575 2675       Net I/O     719 -889.6 -170.6 -896.7 110.1 -786.6              Intake/Output Summary (Last 24 hours) at 7/9/2024 2155  Last data filed at 7/9/2024 2000  Gross per 24 hour   Intake 2535.68 ml   Output 4150 ml   Net -1614.32 ml             sodium chloride  3 g Q8HRS    ferric gluconate (Ferrlecit) IV  250 mg BID    fludrocortisone  0.1 mg BID    haloperidol lactate  1 mg Q4HRS PRN    dexmedetomidine (Precedex) infusion  0.1-1.5 mcg/kg/hr (Order-Specific) Continuous    simethicone  125 mg TID PRN    midodrine  5 mg Q8HRS    hydroxychloroquine  400 mg Once per day on Monday Tuesday Wednesday Thursday Friday    And    [START ON 7/13/2024] hydroxychloroquine  200 mg Once per day on Sunday Saturday    3% sodium chloride  0-80 mL/hr Continuous    ALPRAZolam  0.5 mg TID PRN    MD Alert...Adult ICU Electrolyte Replacement per Pharmacy   PHARMACY TO DOSE    labetalol  10 mg Q10 MIN PRN    hydrALAZINE  10 mg Q2HRS PRN    acetaminophen  650 mg Q6HRS PRN    ondansetron  4 mg Q4HRS PRN    ondansetron  4 mg Q4HRS PRN    promethazine  12.5-25 mg Q4HRS PRN    promethazine  12.5-25 mg Q4HRS PRN    prochlorperazine  5-10 mg Q4HRS PRN    amitriptyline  100 mg QHS PRN    docusate sodium  100 mg BID    folic acid  2 mg DAILY    gabapentin  300 mg BID    NORepinephrine  0-1 mcg/kg/min Continuous    rosuvastatin  5 mg Q EVENING    enoxaparin (LOVENOX) injection  1 mg/kg Q12HRS    aspirin  81 mg DAILY    HYDROcodone-acetaminophen  1 Tablet Q8HRS PRN       Assessment and Plan:  Hospital day # 3 right MCA stroke  POD # N/A  Prophylactic anticoagulation: yes         Start date/time: If clinically  indicated given stroke with inability to obtain recanalization and endovascular lab     Brain Compression: Yes Nontraumatic  Stable exam  Needs anticoagulation for occluded MCA branch, ICA dissection, however this carries risk of intracranial hemorrhage.

## 2024-07-10 NOTE — PROGRESS NOTES
Neurosurgery Progress Note    Subjective:  No acute events    Exam:  Awake alert  Speech appropriate  Right  bicep iliopsoas dorsiflexion 5/5  Left upper extremity 0/5, IP dorsiflexion 4 -/5  Sensation present for touch bilateral upper lower extremities    BP  Min: 138/64  Max: 177/69  Pulse  Av.6  Min: 77  Max: 106  Resp  Av  Min: 15  Max: 60  Temp  Av.2 °C (97.2 °F)  Min: 35.8 °C (96.5 °F)  Max: 36.7 °C (98.1 °F)  SpO2  Av.2 %  Min: 92 %  Max: 99 %    No data recorded    Recent Labs     24  02124  0250 24  0503   WBC 15.3* 14.1* 7.5   RBC 3.23* 3.39* 3.00*   HEMOGLOBIN 10.6* 10.8* 9.9*   HEMATOCRIT 31.4* 33.0* 29.7*   MCV 97.2 97.3 99.0*   MCH 32.8 31.9 33.0   MCHC 33.8 32.7 33.3   RDW 48.7 48.9 50.5*   PLATELETCT 502* 453* 370   MPV 9.3 9.2 9.3     Recent Labs     24  0815 24  0250 24  0938 24  0503 24  0643 24  1210 24  1740   SODIUM 144   < > 146*   < > 143 143 143 142   POTASSIUM 3.9  --  3.9  --  3.6  --   --   --    CHLORIDE 108  --  112  --  112  --   --   --    CO2 23  --  20  --  20  --   --   --    GLUCOSE 134*  --  123*  --  123*  --   --   --    BUN 3*  --  3*  --  2*  --   --   --    CREATININE 0.56  --  0.56  --  0.36*  --   --   --    CALCIUM 8.4*  --  8.5  --  8.2*  --   --   --     < > = values in this interval not displayed.                 Intake/Output                         24 0700 - 24 - 07/10/24 0659      Total  Total                 Intake    P.O.  900  -- 900  826  200 1026    P.O. 900 -- 900     I.V.  604  765.4 1369.4  377.3  485.1 862.4    Magnesium Sulfate Volume -- -- -- 0 -- 0    Precedex Volume 54.2 -- 54.2 -- -- --    Volume (mL) (3% sodium chloride (Hypertonic Saline) 500mL infusion) 549.8 765.4 1315.2 377.3 485.1 862.4    IV Piggyback  --  -- --  0  -- 0    Volume (mL) (ferric gluconate complex  (Ferrlecit) 250 mg in  mL IVPB) -- -- -- 0 -- 0    Total Intake 1504 765.4 2269.4 1203.3 685.1 1888.4       Output    Urine  785  1655 2440  2100  575 2675    Output (mL) (Urethral Catheter) 785 1655 2440 2100 575 2675    Stool  --  -- --  --  -- --    Number of Times Stooled -- 1 x 1 x -- 1 x 1 x    Total Output 785 1655 2440 2100 575 2675       Net I/O     719 -889.6 -170.6 -896.7 110.1 -786.6              Intake/Output Summary (Last 24 hours) at 7/9/2024 2156  Last data filed at 7/9/2024 2000  Gross per 24 hour   Intake 2535.68 ml   Output 4150 ml   Net -1614.32 ml             sodium chloride  3 g Q8HRS    ferric gluconate (Ferrlecit) IV  250 mg BID    fludrocortisone  0.1 mg BID    haloperidol lactate  1 mg Q4HRS PRN    dexmedetomidine (Precedex) infusion  0.1-1.5 mcg/kg/hr (Order-Specific) Continuous    simethicone  125 mg TID PRN    midodrine  5 mg Q8HRS    hydroxychloroquine  400 mg Once per day on Monday Tuesday Wednesday Thursday Friday    And    [START ON 7/13/2024] hydroxychloroquine  200 mg Once per day on Sunday Saturday    3% sodium chloride  0-80 mL/hr Continuous    ALPRAZolam  0.5 mg TID PRN    MD Alert...Adult ICU Electrolyte Replacement per Pharmacy   PHARMACY TO DOSE    labetalol  10 mg Q10 MIN PRN    hydrALAZINE  10 mg Q2HRS PRN    acetaminophen  650 mg Q6HRS PRN    ondansetron  4 mg Q4HRS PRN    ondansetron  4 mg Q4HRS PRN    promethazine  12.5-25 mg Q4HRS PRN    promethazine  12.5-25 mg Q4HRS PRN    prochlorperazine  5-10 mg Q4HRS PRN    amitriptyline  100 mg QHS PRN    docusate sodium  100 mg BID    folic acid  2 mg DAILY    gabapentin  300 mg BID    NORepinephrine  0-1 mcg/kg/min Continuous    rosuvastatin  5 mg Q EVENING    enoxaparin (LOVENOX) injection  1 mg/kg Q12HRS    aspirin  81 mg DAILY    HYDROcodone-acetaminophen  1 Tablet Q8HRS PRN       Assessment and Plan:  Hospital day # 4 right MCA stroke  POD # N/A  Prophylactic anticoagulation: yes         Start date/time: If clinically  indicated given stroke with inability to obtain recanalization and endovascular lab     Brain Compression: Yes Nontraumatic  Stable exam  Needs anticoagulation for occluded MCA branch, ICA dissection, however this carries risk of intracranial hemorrhage.  Normalize Na  Keep well hydrated

## 2024-07-10 NOTE — CARE PLAN
The patient is Watcher - Medium risk of patient condition declining or worsening    Shift Goals  Clinical Goals: Q2 neuro checks, 1500 FW restriction, q 6 na checks  Patient Goals: rest  Family Goals: updates    Progress made toward(s) clinical / shift goals:    Problem: Neuro Status  Goal: Neuro status will remain stable or improve  Outcome: Progressing     Problem: Urinary Elimination  Goal: Establish and maintain regular urinary output  Outcome: Progressing     Problem: Bowel Elimination  Goal: Establish and maintain regular bowel function  Outcome: Progressing     Problem: Pain - Standard  Goal: Alleviation of pain or a reduction in pain to the patient’s comfort goal  Outcome: Progressing      PRNs given throughout shift     Problem: Fall Risk  Goal: Patient will remain free from falls  Outcome: Progressing       Patient is not progressing towards the following goals:

## 2024-07-10 NOTE — PROGRESS NOTES
Critical Care Progress Note    Date of admission  7/5/2024    Chief Complaint  33 y.o. female admitted 7/5/2024 with acute CVA    Hospital Course  Ms. Sánchez is a 33 y.o. 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  She was seen in the emergency department at [facility], but symptoms resolved at that time.  She had a normal head CT without contrast, but a CTA head showed a right M3 occlusion.  She was transferred to [facility] on 7/5/2024 without requiring thrombolytics.  While in the ED, she again developed symptoms and a repeat CTA at that time showed an right M2 occlusion for which she was administered TNK and went to IR for mechanical thrombectomy.  Unfortunately the thrombectomy was unsuccessful and resulted in an ICA dissection with essentially TICI 0 flow.  She was admitted to the intensive care unit for ongoing critical care management.  7/6 - levophed infusion for SBP goals > 130 and < 170, neurosurgery consulted to monitor for need for prophylactic hemocrani, 3% started  7/7 - levo drip, ongoing left sided weakness, 23% bolus needed for reach Na goal  7/8 - SBP goals > 120-->levo ongoing, 3% ongoing, precedex on/off overnight for anxiety  7/9 - levo off, 3% ongoing, improved anxiety today    Interval Problem Update  Reviewed last 24 hour events:   - pt with more agitation and anxiety overnight-->started on Precedx again   - precedex now at 1.2, haldol and xanax   - sleepy, a/ox4, LUE flaccid, w/d to left leg   - c/o should pain   - SR/ST 60-140s   - -180s   - afebrile   - level 5 diet   - last BM 7.9   - UOP of 1.8 liters overnight, Nunez   - 3% at 80cc/hr   - PT/OT   - no RT issues   -  lovenox, therapeutic   - Na 147   - Hb 9.9   - K 4.1   - creat 0.39   - Mg 1.9    Yesterday's Events:   - no events overnight   - a/ox4   - left facial droop, left arm paralysis and not w/d'ing   - right side intact   - SR/ST 60-100s   - -160s   - off levo   - afebrile   - last BM today   - UOP of 1.5 liters overnight, east   - 3% at 60cc/hr   - PT/OT   - no RT issues   - therapeutic lovenox   - asa/statin   - no abx   - home plaquenil restarted   - Mg 1.7   - K  3.6    Review of Systems  Review of Systems   Constitutional:  Positive for malaise/fatigue. Negative for fever.   HENT:  Negative for sore throat.    Eyes:  Negative for double vision.   Respiratory:  Negative for cough and shortness of breath.    Cardiovascular:  Negative for chest pain.   Gastrointestinal:  Negative for abdominal pain, diarrhea and nausea.   Genitourinary:  Negative for flank pain.   Musculoskeletal:  Negative for back pain and myalgias.   Skin:  Negative for itching.   Neurological:  Positive for sensory change, focal weakness and headaches. Negative for dizziness and speech change.   Endo/Heme/Allergies:  Does not bruise/bleed easily.   Psychiatric/Behavioral:  Negative for depression. The patient is nervous/anxious and has insomnia.    All other systems reviewed and are negative.       Vital Signs for last 24 hours   Temp:  [35.8 °C (96.5 °F)-37.3 °C (99.1 °F)] 35.9 °C (96.6 °F)  Pulse:  [] 66  Resp:  [15-36] 19  BP: ()/(35-94) 159/69  SpO2:  [93 %-99 %] 95 %    Respiratory Information for the last 24 hours      Physical Exam   Physical Exam  Vitals and nursing note reviewed.   Constitutional:       General: She is sleeping.      Appearance: She is well-developed. She is obese. She is ill-appearing. She is not toxic-appearing.      Interventions: She is restrained.      Comments: Pleasant/cooperative/calm   HENT:      Head: Normocephalic.      Right Ear: External ear normal.      Left Ear: External ear normal.       Nose: Nose normal. No rhinorrhea.      Mouth/Throat:      Mouth: Mucous membranes are moist.      Pharynx: Oropharynx is clear.   Eyes:      Conjunctiva/sclera: Conjunctivae normal.      Pupils: Pupils are equal, round, and reactive to light.      Comments: Eyes closed but able to open to command   Neck:      Vascular: No JVD.      Trachea: No tracheal deviation.   Cardiovascular:      Rate and Rhythm: Normal rate and regular rhythm.      Chest Wall: PMI is not displaced.      Pulses: Normal pulses.           Radial pulses are 2+ on the right side and 2+ on the left side.        Dorsalis pedis pulses are 2+ on the right side and 2+ on the left side.      Heart sounds: Normal heart sounds. No murmur heard.  Pulmonary:      Effort: Pulmonary effort is normal. No tachypnea or respiratory distress.      Breath sounds: No wheezing, rhonchi or rales.   Abdominal:      General: Bowel sounds are normal. There is no distension.      Palpations: Abdomen is soft.      Tenderness: There is no abdominal tenderness. There is no guarding or rebound.      Comments: Right groin:  large amount of hematoma/bruising   Musculoskeletal:         General: No tenderness.      Cervical back: Neck supple.      Right lower leg: No edema.      Left lower leg: No edema.   Lymphadenopathy:      Cervical: No cervical adenopathy.   Skin:     General: Skin is warm and dry.      Capillary Refill: Capillary refill takes less than 2 seconds.      Findings: No rash.   Neurological:      Mental Status: She is oriented to person, place, and time and easily aroused.      Cranial Nerves: Cranial nerve deficit present.      Sensory: No sensory deficit.      Motor: Weakness present.      Comments: A/ox4, persistent left-sided weakness and neglect with facial droop   Psychiatric:         Mood and Affect: Mood normal.         Behavior: Behavior normal. Behavior is cooperative.         Medications  Current Facility-Administered Medications   Medication Dose  Route Frequency Provider Last Rate Last Admin    sodium chloride (Salt) tablet 3 g  3 g Oral Q8HRS Lili Epps M.D.   3 g at 07/10/24 0618    ferric gluconate complex (Ferrlecit) 250 mg in  mL IVPB  250 mg Intravenous BID Lili Epps M.D. 100 mL/hr at 07/10/24 0617 250 mg at 07/10/24 0617    fludrocortisone (Florinef) tablet 0.1 mg  0.1 mg Oral BID Enrrique Welsh M.D.   0.1 mg at 07/10/24 0619    haloperidol lactate (Haldol) injection 1 mg  1 mg Intravenous Q4HRS PRN Pete Dillard M.D.   1 mg at 07/09/24 2308    dexmedetomidine (Precedex) 400 mcg/100mL infusion  0.1-1.5 mcg/kg/hr (Order-Specific) Intravenous Continuous Pete Dillard M.D. 15.2 mL/hr at 07/10/24 0616 1.4 mcg/kg/hr at 07/10/24 0616    simethicone (Mylicon) chewable tablet 125 mg  125 mg Oral TID PRN Lili Epps M.D.   125 mg at 07/09/24 1934    midodrine (Proamatine) tablet 5 mg  5 mg Oral Q8HRS Jeremy M Gonda, M.D.   5 mg at 07/08/24 1448    hydroxychloroquine (Plaquenil) tablet 400 mg  400 mg Oral Once per day on Monday Tuesday Wednesday Thursday Friday Lili Epps M.D.   400 mg at 07/10/24 0618    And    [START ON 7/13/2024] hydroxychloroquine (Plaquenil) tablet 200 mg  200 mg Oral Once per day on Sunday Saturday Lili Epps M.D.        3% sodium chloride (Hypertonic Saline) 500mL infusion  0-80 mL/hr Intravenous Continuous Enrrique Welsh M.D. 70 mL/hr at 07/10/24 0230 70 mL/hr at 07/10/24 0230    ALPRAZolam (Xanax) tablet 0.5 mg  0.5 mg Oral TID PRN Jeremy M Gonda, M.D.   0.5 mg at 07/09/24 3514    MD Alert...ICU Electrolyte Replacement per Pharmacy   Other PHARMACY TO DOSE Jeremy M Gonda, M.D.        labetalol (Normodyne/Trandate) injection 10 mg  10 mg Intravenous Q10 MIN PRN Jeremy M Gonda, M.D.        hydrALAZINE (Apresoline) injection 10 mg  10 mg Intravenous Q2HRS PRN Jeremy M Gonda, M.D.        acetaminophen (Tylenol) tablet 650 mg  650 mg Oral Q6HRS PRN Jeremy M Gonda, M.D.   650  mg at 07/09/24 1934    ondansetron (Zofran) syringe/vial injection 4 mg  4 mg Intravenous Q4HRS PRN Jeremy M Gonda, M.D.   4 mg at 07/07/24 0336    ondansetron (Zofran ODT) dispertab 4 mg  4 mg Oral Q4HRS PRN Jeremy M Gonda, M.D.        promethazine (Phenergan) tablet 12.5-25 mg  12.5-25 mg Oral Q4HRS PRN Jeremy M Gonda, M.D.        promethazine (Phenergan) suppository 12.5-25 mg  12.5-25 mg Rectal Q4HRS PRN Jeremy M Gonda, M.D.        prochlorperazine (Compazine) injection 5-10 mg  5-10 mg Intravenous Q4HRS PRN Jeremy M Gonda, M.D.   5 mg at 07/05/24 1322    amitriptyline (Elavil) tablet 100 mg  100 mg Oral QHS PRN Jeremy M Gonda, M.D.   100 mg at 07/09/24 2208    docusate sodium (Colace) capsule 100 mg  100 mg Oral BID Jeremy M Gonda, M.D.   100 mg at 07/09/24 0633    folic acid (Folvite) tablet 2 mg  2 mg Oral DAILY Jeremy M Gonda, M.D.   2 mg at 07/10/24 0618    gabapentin (Neurontin) capsule 300 mg  300 mg Oral BID Jeremy M Gonda, M.D.   300 mg at 07/10/24 0618    norepinephrine (Levophed) 8 mg in 250 mL NS infusion (premix)  0-1 mcg/kg/min Intravenous Continuous Jeremy M Gonda, M.D. 3.1 mL/hr at 07/08/24 0524 0.02 mcg/kg/min at 07/08/24 0524    rosuvastatin (Crestor) tablet 5 mg  5 mg Oral Q EVENING Jeremy M Gonda, M.D.   5 mg at 07/09/24 1733    enoxaparin (Lovenox) inj 80 mg  1 mg/kg Subcutaneous Q12HRS Jeremy M Gonda, M.D.   80 mg at 07/10/24 0618    aspirin EC tablet 81 mg  81 mg Oral DAILY Jeremy M Gonda, M.D.   81 mg at 07/10/24 0618    HYDROcodone-acetaminophen (Norco) 5-325 MG per tablet 1 Tablet  1 Tablet Oral Q8HRS PRN Jeremy M Gonda, M.D.   1 Tablet at 07/09/24 3020       Fluids    Intake/Output Summary (Last 24 hours) at 7/10/2024 0633  Last data filed at 7/10/2024 0429  Gross per 24 hour   Intake 2617.39 ml   Output 3710 ml   Net -1092.61 ml       Laboratory          Recent Labs     07/08/24  0250 07/08/24  0938 07/09/24  0503 07/09/24  0643 07/09/24  1210 07/09/24  1740 07/10/24  0010   SODIUM  146*   < > 143   < > 143 142 146*   POTASSIUM 3.9  --  3.6  --   --   --   --    CHLORIDE 112  --  112  --   --   --   --    CO2 20  --  20  --   --   --   --    BUN 3*  --  2*  --   --   --   --    CREATININE 0.56  --  0.36*  --   --   --   --    MAGNESIUM 2.0  --  1.7  --   --   --   --    CALCIUM 8.5  --  8.2*  --   --   --   --     < > = values in this interval not displayed.     Recent Labs     07/08/24  0250 07/09/24  0503   GLUCOSE 123* 123*     Recent Labs     07/08/24 0250 07/09/24  0503 07/10/24  0558   WBC 14.1* 7.5 8.9   NEUTSPOLYS 72.90* 67.10 73.90*   LYMPHOCYTES 18.90* 20.70* 16.00*   MONOCYTES 7.00 6.50 6.80   EOSINOPHILS 0.60 4.80 2.50   BASOPHILS 0.30 0.50 0.30     Recent Labs     07/08/24  0250 07/08/24  0938 07/09/24  0503 07/10/24  0010 07/10/24  0558   RBC 3.39*  --  3.00*  --  3.10*   HEMOGLOBIN 10.8*  --  9.9* 11.4* 9.9*   HEMATOCRIT 33.0*  --  29.7* 34.9* 30.4*   PLATELETCT 453*  --  370  --  420   IRON  --  29*  --   --   --    TOTIRONBC  --  252  --   --   --        Imaging  CT:    Reviewed    Assessment/Plan  * Acute ischemic right MCA stroke (HCC)- (present on admission)  Assessment & Plan  With M2 occlusion s/p TNK 7/5 at 0528, attempted thrombectomy at 0725 with no flow and right distal ICA dissection  Continue systemic anticoagulation with Lovenox, eventual transition to coumadin vs NOAC (heme consulted) Continue antiplatelet with aspirin  Neurology, NSG and neuro IR consulted, peak swelling window is closing  Continue statin  Goal -160   Normalize Na by starting to wean 3%, salt tabs 3g every 8 hours  Hematology consulted given hypercoagulable condition, hypercoagulable workup pending  PT/OT/SLP   PM&R consulted-->advised Baclofen 5mg TID      Anemia  Assessment & Plan  Likely due to hematoma around arterial puncture site -->improving  Daily CBC with conservative transfusion strategy    Hyperglycemia  Assessment & Plan  Improved  Discontinue insulin sliding  scale    Hypokalemia  Assessment & Plan  Replete again today    Hypomagnesemia  Assessment & Plan  Replete again with MgSO4 2g IV     SLE (systemic lupus erythematosus related syndrome) (HCC)- (present on admission)  Assessment & Plan  Followed by Dr. Morales for Lupus, RA, and thrombocytosis  Continue hydroxychloroquine and will stop Rinvoq due to increased cardiovascular phenomenon   Hematology consulted    Thrombocytosis- (present on admission)  Assessment & Plan  Chronic, followed by outpatient hematology in [place].    Mixed dyslipidemia- (present on admission)  Assessment & Plan  Continue rosuvastatin    Anxiety- (present on admission)  Assessment & Plan  History of chronic anxiety treated with Ativan, gabapentin  Continue gabapentin, qhs amitriptyline, and as needed xanax today  Reassurance  Start nightly Seroquel at 25mg PO  I am actively titrating Precedex for RASS goals of -1 to +1         VTE:  Lovenox  Ulcer: Not Indicated  Lines: Central Line  Ongoing indication addressed and Nunez Catheter  Ongoing indication addressed    I have performed a physical exam and reviewed and updated ROS and Plan today (7/10/2024). In review of yesterday's note (7/9/2024), there are no changes except as documented above.     The patient remains critically ill today requiring active titration of hypertonic saline as well as Precedex for RASS goal of -1 to +1.  I have assessed and reassessed the patient's neurological status, cardiovascular status, and hemodynamics.  The patient remains at high risk of clinical deterioration, worsening vital organ dysfunction, and death.    Discussed patient condition and risk of morbidity and/or mortality with RN, RT, Pharmacy, Charge nurse / hot rounds, Patient, and neurology and neurosurgery. Critical care time = 107 minutes in directly providing and coordinating critical care and extensive data review.  No time overlap and excludes procedures.

## 2024-07-10 NOTE — PROGRESS NOTES
Called to bedside for restlessness and tachycardia (sinus 120-150's). Patient previously required Precedex but was weaned off earlier in the day (around 1420).  Patient is alert, following commands, moving RUE/RLE 5/5 with left-sided weakness at her new baseline s/p CVA. She says music helps her relax and is singing along to [employer].     Patient also complains of leg cramps for which she usually takes Norco at home.  Resumed Precedex infusion and medicated with PRN Xanax and Norco.    Her right groin site has small hard hematoma with ecchymosis. She is on therapeutic Lovenox for ICA dissection. She denies any right-sided flank/back pain. Hgb 11. Low suspicion of retroperitoneal bleed but remains on differential.     0030 - patients restlessness improving. Tachycardia resolved.   0200 - RASS -1 to -2 - weaning Precedex    Patient remains critically ill. Critical care time (in addition to Dr. Epps 7/9) = 40 minutes in directly providing and coordinating critical care and extensive data review.  No time overlap and excludes procedures.  NICOLE Pope.

## 2024-07-10 NOTE — DISCHARGE PLANNING
0844: Insurance authorization remains pending, TCC will continue to follow.    1117: Contacted mother to discuss discharge plan after rehab. Patient remains maxA x2, per physiatry patient will likely need heavy physical assistance upon discharge. Mother reports she is committed to providing 24/7 care upon discharge, but states she is nearly 67yo and doesn't have strong arm strength or the best back. She has two sisters that live about 30 minutes away that might be able to help with heavy lifting. Mother is also caring for her 10 year old grandson. Discussed SNF vs IPR, mother might be open to SNF and then IPR when patient shows some more progress.     Mother reports she is going to think about it and discuss with family. Mother also wants to talk to nursing/therapy at [facility] to help get a better idea of the kind of physical assistance patient needs.     Discussed with Dr Haider who is requesting updated therapy evals. Notified cm, PT and OT. TCC will continue to follow.

## 2024-07-10 NOTE — PROGRESS NOTES
Notified by [facility] of new bundle branch block, RN notified Dr. Epps, new orders for a stat EKG placed.

## 2024-07-10 NOTE — THERAPY
"Occupational Therapy  Daily Treatment     Patient Name: Ellie Sánchez  Age:  33 y.o., Sex:  female  Medical Record #: [id number]  Today's Date: 7/10/2024     Precautions  Precautions: Fall Risk, Swallow Precautions  Comments: L deficits    Assessment    Pt seen for OT treatment with focus on LUE. RN and physical therapist both report pt with significant pain and sensitivity to touch/movement of LUE yesterday. When OT arrived, pt's LUE with significant hypertonicity resulting in shoulder IR and adduction. Pt with edema in left hand but only able to tolerate elevation on a single pillow. Pt tolerated LUE soft tissue mobiliation and gentle joint mobilizations to wrist and scapula, gentle PROM to all joint in LUE with noticible decrease in hypertonicity with rhythmic oscillating motions. At end of session, pt's LUE was elvated on 4 pillows with no c/o pain and noticible relaxation of LUE.     Plan    Treatment Plan Status: Continue Current Treatment Plan  Type of Treatment: Self Care / Activities of Daily Living, Adaptive Equipment, Cognitive Skill Development, Neuro Re-Education / Balance, Therapeutic Exercises, Therapeutic Activity, Sensory Integration Techniques  Treatment Frequency: 4 Times per Week  Treatment Duration: Until Therapy Goals Met    DC Equipment Recommendations: Unable to determine at this time  Discharge Recommendations: Recommend post-acute placement for additional occupational therapy services prior to discharge home    Subjective    \"I'm trying to wake up.\"      Objective       07/10/24 1128   Treatment Charges   Vitals   O2 Delivery Device Room air w/o2 available   Pain 0 - 10 Group   Therapist Pain Assessment   (one c/o discomfort during LUE ROM)   Cognition    Cognition / Consciousness X   Comments eyes closed but answering questions   Upper Body Muscle Tone   Lt Upper Extremity Muscle Tone Hypertonic   Supine Upper Body Exercises   Comments LUE soft tissue mobiliation and gentle joint " mobilizations to wrist and scapula, gentle PROM to all joint in LUE with noticible decrease in hypertonicity with rhythmic oscillating motions. At start of session pt was only able to tolerate LUE being elevated on a single pillow. At end of session, pt's LUE was elvated on 4 pillows with no c/o pain and noticible relaxation of LUE.   Other Treatments   Other Treatments Provided Patient and family educated on proper positioning of LUE to decrease edema and to prevent further tightening in shoulder IR and adduction   Modified Anthony (mRS)   Modified Stephenson Score 5   Edema Management   Other Edema Mgmt Treatments gentle retrograde massage, PROM and elevation   Patient / Family Goals   Patient / Family Goal #1 to get better   Short Term Goals   Short Term Goal # 1 Pt will protect LUE during bed mobility without vc   Short Term Goal # 2 Pt will attend to L visual field during seated ADL tasks for at least 5 min   Short Term Goal # 3 Pt will demo adonay techniques during seated G/H tasks with min A and postural support   Short Term Goal # 4 Pt will complete ADL txf mod A   Education Group   Additional Comments LUE positioning   Occupational Therapy Treatment Plan    O.T. Treatment Plan Continue Current Treatment Plan

## 2024-07-10 NOTE — THERAPY
Physical Therapy   Daily Treatment     Patient Name: Ellie Sánchez  Age:  33 y.o., Sex:  female  Medical Record #: [id number]  Today's Date: 7/10/2024     Precautions  Precautions: Fall Risk;Other (See Comments)  Comments: SBP goal: 110-160; L side deficits    Assessment    Patient seen for 2nd PT treatment session, per request from patient, to assist patient back to bed.     Plan    Treatment Plan Status: Continue Current Treatment Plan  Type of Treatment: Bed Mobility, Equipment, Family / Caregiver Training, Neuro Re-Education / Balance, Therapeutic Activities, Therapeutic Exercise  Treatment Frequency: 5 Times per Week  Treatment Duration: Until Therapy Goals Met    DC Equipment Recommendations: Unable to determine at this time  Discharge Recommendations: Recommend post-acute placement for additional physical therapy services prior to discharge home    Objective       07/10/24 1522   Other Treatments   Other Treatments Provided Patient was assisted with don of PRAFO boot on LLE. Educated patient's family regarding the importance and need for it at this time. Educated family on appropriate position of LUE with pillows.   Balance   Sitting Balance (Static) Poor   Sitting Balance (Dynamic) Trace +   Standing Balance (Static) Poor -   Standing Balance (Dynamic) Trace +   Weight Shift Sitting Poor   Weight Shift Standing Poor   Skilled Intervention Verbal Cuing;Sequencing;Facilitation;Compensatory Strategies   Comments Seated EOB; Standing within Maulik Steady   Bed Mobility    Sit to Supine Moderate Assist   Scooting Maximal Assist  (To scoot up in the bed)   Skilled Intervention Verbal Cuing;Sequencing;Facilitation;Compensatory Strategies   Comments HOB flat, without use of rails, towards L side of the bed   Functional Mobility   Sit to Stand Minimal Assist  (Within maulik steady)   Bed, Chair, Wheelchair Transfer Total Assist   Transfer Method Steady Pivot Lift   Mobility Chair-sit-stand within maulik steady-EOB-bed    Skilled Intervention Verbal Cuing;Sequencing;Facilitation;Compensatory Strategies   Comments Squat pivot transfer not attempted back to bed due to concerns about safely getting patient back to bed due to height of the bed   6 Clicks Assessment - How much HELP from from another person do you currently need... (If the patient hasn't done an activity recently, how much help from another person do you think he/she would need if he/she tried?)   Turning from your back to your side while in a flat bed without using bedrails? 2   Moving from lying on your back to sitting on the side of a flat bed without using bedrails? 2   Moving to and from a bed to a chair (including a wheelchair)? 2   Standing up from a chair using your arms (e.g., wheelchair, or bedside chair)? 2   Walking in hospital room? 1   Climbing 3-5 steps with a railing? 1   6 clicks Mobility Score 10   Physical Therapy Treatment Plan   Physical Therapy Treatment Plan Continue Current Treatment Plan   Interdisciplinary Plan of Care Collaboration   IDT Collaboration with  Nursing;Family / Caregiver   Patient Position at End of Therapy In Bed;Call Light within Reach;Tray Table within Reach;Family / Friend in Room;Other (Comments)  (RN at bedside; Patient's aunt at bedside)   Session Information   Date / Session Number  7/10-4(4/5, 7/14)

## 2024-07-11 ENCOUNTER — HOSPITAL ENCOUNTER (INPATIENT)
Facility: REHABILITATION | Age: 34
End: 2024-07-11
Attending: PHYSICAL MEDICINE & REHABILITATION | Admitting: PHYSICAL MEDICINE & REHABILITATION
Payer: MEDICAID

## 2024-07-11 LAB
ANION GAP SERPL CALC-SCNC: 10 MMOL/L (ref 7–16)
BASOPHILS # BLD AUTO: 0.5 % (ref 0–1.8)
BASOPHILS # BLD: 0.04 K/UL (ref 0–0.12)
BUN SERPL-MCNC: 4 MG/DL (ref 8–22)
CALCIUM SERPL-MCNC: 8.3 MG/DL (ref 8.5–10.5)
CHLORIDE SERPL-SCNC: 109 MMOL/L (ref 96–112)
CO2 SERPL-SCNC: 23 MMOL/L (ref 20–33)
CREAT SERPL-MCNC: 0.48 MG/DL (ref 0.5–1.4)
EOSINOPHIL # BLD AUTO: 0.37 K/UL (ref 0–0.51)
EOSINOPHIL NFR BLD: 4.2 % (ref 0–6.9)
ERYTHROCYTE [DISTWIDTH] IN BLOOD BY AUTOMATED COUNT: 49.5 FL (ref 35.9–50)
GFR SERPLBLD CREATININE-BSD FMLA CKD-EPI: 126 ML/MIN/1.73 M 2
GLUCOSE SERPL-MCNC: 95 MG/DL (ref 65–99)
HCT VFR BLD AUTO: 31.1 % (ref 37–47)
HGB BLD-MCNC: 10.2 G/DL (ref 12–16)
IMM GRANULOCYTES # BLD AUTO: 0.05 K/UL (ref 0–0.11)
IMM GRANULOCYTES NFR BLD AUTO: 0.6 % (ref 0–0.9)
LYMPHOCYTES # BLD AUTO: 2.09 K/UL (ref 1–4.8)
LYMPHOCYTES NFR BLD: 23.6 % (ref 22–41)
MAGNESIUM SERPL-MCNC: 1.8 MG/DL (ref 1.5–2.5)
MCH RBC QN AUTO: 32.1 PG (ref 27–33)
MCHC RBC AUTO-ENTMCNC: 32.8 G/DL (ref 32.2–35.5)
MCV RBC AUTO: 97.8 FL (ref 81.4–97.8)
MONOCYTES # BLD AUTO: 0.63 K/UL (ref 0–0.85)
MONOCYTES NFR BLD AUTO: 7.1 % (ref 0–13.4)
NEUTROPHILS # BLD AUTO: 5.66 K/UL (ref 1.82–7.42)
NEUTROPHILS NFR BLD: 64 % (ref 44–72)
NRBC # BLD AUTO: 0.06 K/UL
NRBC BLD-RTO: 0.7 /100 WBC (ref 0–0.2)
PLATELET # BLD AUTO: 387 K/UL (ref 164–446)
PMV BLD AUTO: 9 FL (ref 9–12.9)
POTASSIUM SERPL-SCNC: 3.5 MMOL/L (ref 3.6–5.5)
RBC # BLD AUTO: 3.18 M/UL (ref 4.2–5.4)
SODIUM SERPL-SCNC: 142 MMOL/L (ref 135–145)
SODIUM SERPL-SCNC: 144 MMOL/L (ref 135–145)
WBC # BLD AUTO: 8.8 K/UL (ref 4.8–10.8)

## 2024-07-11 PROCEDURE — 700111 HCHG RX REV CODE 636 W/ 250 OVERRIDE (IP): Mod: JZ | Performed by: INTERNAL MEDICINE

## 2024-07-11 PROCEDURE — 85300 ANTITHROMBIN III ACTIVITY: CPT

## 2024-07-11 PROCEDURE — 700102 HCHG RX REV CODE 250 W/ 637 OVERRIDE(OP): Performed by: INTERNAL MEDICINE

## 2024-07-11 PROCEDURE — 99232 SBSQ HOSP IP/OBS MODERATE 35: CPT | Performed by: PSYCHIATRY & NEUROLOGY

## 2024-07-11 PROCEDURE — 700105 HCHG RX REV CODE 258: Performed by: INTERNAL MEDICINE

## 2024-07-11 PROCEDURE — 700101 HCHG RX REV CODE 250: Performed by: INTERNAL MEDICINE

## 2024-07-11 PROCEDURE — A9270 NON-COVERED ITEM OR SERVICE: HCPCS | Performed by: INTERNAL MEDICINE

## 2024-07-11 PROCEDURE — 85301 ANTITHROMBIN III ANTIGEN: CPT

## 2024-07-11 PROCEDURE — 99255 IP/OBS CONSLTJ NEW/EST HI 80: CPT | Performed by: HOSPITALIST

## 2024-07-11 PROCEDURE — 92526 ORAL FUNCTION THERAPY: CPT

## 2024-07-11 PROCEDURE — 97130 THER IVNTJ EA ADDL 15 MIN: CPT

## 2024-07-11 PROCEDURE — 80048 BASIC METABOLIC PNL TOTAL CA: CPT

## 2024-07-11 PROCEDURE — 81240 F2 GENE: CPT

## 2024-07-11 PROCEDURE — 99291 CRITICAL CARE FIRST HOUR: CPT | Performed by: INTERNAL MEDICINE

## 2024-07-11 PROCEDURE — 97129 THER IVNTJ 1ST 15 MIN: CPT

## 2024-07-11 PROCEDURE — 700111 HCHG RX REV CODE 636 W/ 250 OVERRIDE (IP): Performed by: INTERNAL MEDICINE

## 2024-07-11 PROCEDURE — 85025 COMPLETE CBC W/AUTO DIFF WBC: CPT

## 2024-07-11 PROCEDURE — 97530 THERAPEUTIC ACTIVITIES: CPT

## 2024-07-11 PROCEDURE — 83735 ASSAY OF MAGNESIUM: CPT

## 2024-07-11 PROCEDURE — 81241 F5 GENE: CPT

## 2024-07-11 PROCEDURE — 770000 HCHG ROOM/CARE - INTERMEDIATE ICU *

## 2024-07-11 RX ORDER — QUETIAPINE FUMARATE 25 MG/1
50 TABLET, FILM COATED ORAL NIGHTLY
Status: DISCONTINUED | OUTPATIENT
Start: 2024-07-11 | End: 2024-07-15 | Stop reason: HOSPADM

## 2024-07-11 RX ORDER — POTASSIUM CHLORIDE 1500 MG/1
60 TABLET, EXTENDED RELEASE ORAL ONCE
Status: COMPLETED | OUTPATIENT
Start: 2024-07-11 | End: 2024-07-11

## 2024-07-11 RX ORDER — ESCITALOPRAM OXALATE 10 MG/1
10 TABLET ORAL DAILY
Status: DISCONTINUED | OUTPATIENT
Start: 2024-07-11 | End: 2024-07-15 | Stop reason: HOSPADM

## 2024-07-11 RX ORDER — MAGNESIUM SULFATE HEPTAHYDRATE 40 MG/ML
2 INJECTION, SOLUTION INTRAVENOUS ONCE
Status: COMPLETED | OUTPATIENT
Start: 2024-07-11 | End: 2024-07-11

## 2024-07-11 RX ADMIN — SIMETHICONE 125 MG: 125 TABLET, CHEWABLE ORAL at 10:29

## 2024-07-11 RX ADMIN — SODIUM CHLORIDE 3 G: 1 TABLET ORAL at 05:39

## 2024-07-11 RX ADMIN — ASPIRIN 81 MG: 81 TABLET, COATED ORAL at 05:42

## 2024-07-11 RX ADMIN — QUETIAPINE FUMARATE 50 MG: 25 TABLET ORAL at 21:32

## 2024-07-11 RX ADMIN — SIMETHICONE 125 MG: 125 TABLET, CHEWABLE ORAL at 16:18

## 2024-07-11 RX ADMIN — FLUDROCORTISONE ACETATE 0.1 MG: 0.1 TABLET ORAL at 17:17

## 2024-07-11 RX ADMIN — FLUDROCORTISONE ACETATE 0.1 MG: 0.1 TABLET ORAL at 05:38

## 2024-07-11 RX ADMIN — SODIUM CHLORIDE 250 MG: 9 INJECTION, SOLUTION INTRAVENOUS at 05:27

## 2024-07-11 RX ADMIN — SODIUM CHLORIDE 3 G: 1 TABLET ORAL at 21:32

## 2024-07-11 RX ADMIN — HYDROXYCHLOROQUINE SULFATE 400 MG: 200 TABLET ORAL at 05:29

## 2024-07-11 RX ADMIN — GABAPENTIN 300 MG: 300 CAPSULE ORAL at 17:16

## 2024-07-11 RX ADMIN — BACLOFEN 5 MG: 10 TABLET ORAL at 17:17

## 2024-07-11 RX ADMIN — ENOXAPARIN SODIUM 80 MG: 100 INJECTION SUBCUTANEOUS at 17:17

## 2024-07-11 RX ADMIN — ESCITALOPRAM OXALATE 10 MG: 10 TABLET ORAL at 10:29

## 2024-07-11 RX ADMIN — ROSUVASTATIN CALCIUM 5 MG: 5 TABLET, FILM COATED ORAL at 17:17

## 2024-07-11 RX ADMIN — GABAPENTIN 300 MG: 300 CAPSULE ORAL at 05:40

## 2024-07-11 RX ADMIN — MAGNESIUM SULFATE HEPTAHYDRATE 2 G: 2 INJECTION, SOLUTION INTRAVENOUS at 09:51

## 2024-07-11 RX ADMIN — DEXMEDETOMIDINE 1 MCG/KG/HR: 100 INJECTION, SOLUTION INTRAVENOUS at 00:59

## 2024-07-11 RX ADMIN — DOCUSATE SODIUM 100 MG: 100 CAPSULE, LIQUID FILLED ORAL at 17:17

## 2024-07-11 RX ADMIN — SODIUM CHLORIDE 3 G: 1 TABLET ORAL at 15:10

## 2024-07-11 RX ADMIN — BACLOFEN 5 MG: 10 TABLET ORAL at 12:37

## 2024-07-11 RX ADMIN — BACLOFEN 5 MG: 10 TABLET ORAL at 05:37

## 2024-07-11 RX ADMIN — FOLIC ACID 2 MG: 1 TABLET ORAL at 05:41

## 2024-07-11 RX ADMIN — ENOXAPARIN SODIUM 80 MG: 100 INJECTION SUBCUTANEOUS at 05:36

## 2024-07-11 RX ADMIN — POTASSIUM CHLORIDE 60 MEQ: 1500 TABLET, EXTENDED RELEASE ORAL at 09:12

## 2024-07-11 SDOH — ECONOMIC STABILITY: TRANSPORTATION INSECURITY
IN THE PAST 12 MONTHS, HAS THE LACK OF TRANSPORTATION KEPT YOU FROM MEDICAL APPOINTMENTS OR FROM GETTING MEDICATIONS?: NO

## 2024-07-11 SDOH — ECONOMIC STABILITY: TRANSPORTATION INSECURITY
IN THE PAST 12 MONTHS, HAS LACK OF RELIABLE TRANSPORTATION KEPT YOU FROM MEDICAL APPOINTMENTS, MEETINGS, WORK OR FROM GETTING THINGS NEEDED FOR DAILY LIVING?: NO

## 2024-07-11 ASSESSMENT — LIFESTYLE VARIABLES
EVER HAD A DRINK FIRST THING IN THE MORNING TO STEADY YOUR NERVES TO GET RID OF A HANGOVER: NO
CONSUMPTION TOTAL: NEGATIVE
TOTAL SCORE: 0
EVER FELT BAD OR GUILTY ABOUT YOUR DRINKING: NO
TOTAL SCORE: 0
AVERAGE NUMBER OF DAYS PER WEEK YOU HAVE A DRINK CONTAINING ALCOHOL: 0
ALCOHOL_USE: NO
ON A TYPICAL DAY WHEN YOU DRINK ALCOHOL HOW MANY DRINKS DO YOU HAVE: 0
DOES PATIENT WANT TO STOP DRINKING: NO
REASON UNABLE TO ASSESS: .
HAVE YOU EVER FELT YOU SHOULD CUT DOWN ON YOUR DRINKING: NO
HOW MANY TIMES IN THE PAST YEAR HAVE YOU HAD 5 OR MORE DRINKS IN A DAY: 0
HAVE PEOPLE ANNOYED YOU BY CRITICIZING YOUR DRINKING: NO
TOTAL SCORE: 0

## 2024-07-11 ASSESSMENT — ENCOUNTER SYMPTOMS
DIZZINESS: 0
DIARRHEA: 0
BRUISES/BLEEDS EASILY: 0
COUGH: 0
ABDOMINAL PAIN: 0
HEADACHES: 1
NAUSEA: 0
FLANK PAIN: 0
HEADACHES: 0
DOUBLE VISION: 0
CHILLS: 0
NERVOUS/ANXIOUS: 1
DEPRESSION: 0
LOSS OF CONSCIOUSNESS: 0
FOCAL WEAKNESS: 1
PALPITATIONS: 0
SPEECH CHANGE: 0
SORE THROAT: 0
BACK PAIN: 0
SENSORY CHANGE: 1
INSOMNIA: 1
VOMITING: 0
FEVER: 0
MYALGIAS: 0
SHORTNESS OF BREATH: 0

## 2024-07-11 ASSESSMENT — SOCIAL DETERMINANTS OF HEALTH (SDOH)
WITHIN THE LAST YEAR, HAVE YOU BEEN KICKED, HIT, SLAPPED, OR OTHERWISE PHYSICALLY HURT BY YOUR PARTNER OR EX-PARTNER?: NO
WITHIN THE LAST YEAR, HAVE TO BEEN RAPED OR FORCED TO HAVE ANY KIND OF SEXUAL ACTIVITY BY YOUR PARTNER OR EX-PARTNER?: NO
WITHIN THE PAST 12 MONTHS, YOU WORRIED THAT YOUR FOOD WOULD RUN OUT BEFORE YOU GOT THE MONEY TO BUY MORE: NEVER TRUE
WITHIN THE LAST YEAR, HAVE YOU BEEN HUMILIATED OR EMOTIONALLY ABUSED IN OTHER WAYS BY YOUR PARTNER OR EX-PARTNER?: NO
WITHIN THE PAST 12 MONTHS, THE FOOD YOU BOUGHT JUST DIDN'T LAST AND YOU DIDN'T HAVE MONEY TO GET MORE: NEVER TRUE
IN THE PAST 12 MONTHS, HAS THE ELECTRIC, GAS, OIL, OR WATER COMPANY THREATENED TO SHUT OFF SERVICE IN YOUR HOME?: NO
WITHIN THE LAST YEAR, HAVE YOU BEEN AFRAID OF YOUR PARTNER OR EX-PARTNER?: NO

## 2024-07-11 ASSESSMENT — COGNITIVE AND FUNCTIONAL STATUS - GENERAL
CLIMB 3 TO 5 STEPS WITH RAILING: TOTAL
MOVING TO AND FROM BED TO CHAIR: A LOT
TURNING FROM BACK TO SIDE WHILE IN FLAT BAD: A LOT
WALKING IN HOSPITAL ROOM: TOTAL
MOBILITY SCORE: 10
STANDING UP FROM CHAIR USING ARMS: A LOT
MOVING FROM LYING ON BACK TO SITTING ON SIDE OF FLAT BED: A LOT
SUGGESTED CMS G CODE MODIFIER MOBILITY: CL

## 2024-07-11 ASSESSMENT — PATIENT HEALTH QUESTIONNAIRE - PHQ9
1. LITTLE INTEREST OR PLEASURE IN DOING THINGS: NOT AT ALL
SUM OF ALL RESPONSES TO PHQ9 QUESTIONS 1 AND 2: 0
2. FEELING DOWN, DEPRESSED, IRRITABLE, OR HOPELESS: NOT AT ALL

## 2024-07-11 ASSESSMENT — PAIN DESCRIPTION - PAIN TYPE
TYPE: ACUTE PAIN

## 2024-07-11 ASSESSMENT — GAIT ASSESSMENTS: GAIT LEVEL OF ASSIST: UNABLE TO PARTICIPATE

## 2024-07-11 NOTE — DISCHARGE PLANNING
Insurance authorization remains pending.  Probable SNF vs IPR   Per RNCM note yesterday, Mom has resigned to the fact that at the level Ellie is currently at , she would be able to assist her at home.   Voalte out to RNCM   TCC remains following       0800 Insurance authorization obtained.

## 2024-07-11 NOTE — THERAPY
"Speech Language Pathology   Daily Treatment     Patient Name: Ellie Sánchez  AGE:  33 y.o., SEX:  female  Medical Record #: [id number]  Date of Service: 7/11/2024      Precautions:  Precautions: Fall Risk, Swallow Precautions         Subjective  Patient cleared by RN for session. Patient received awake, sitting UIC, aunt at bedside. Patient needing max encouragement to participate in session at time mentioning learning disability as rationale for not being able to continue with task and endorsed feeling \"frustrated.\"      Assessment  Dysphagia Therapy:  Patient seen this date for dysphagia management with focus on swallowing exercises and assessing readiness for diet upgrade. PO trials of thin liquids and soft/bite sized assessed. No overt s/sx of aspiration appreciated.     Patient in agreement to participate in swallow exercises with PO of ice chips. Patient completed effortful swallows to improve base of tongue propulsion/ laryngeal vestibular closure, increase swallow efficiency.      Tx: Patient completed ~22reps of effortful swallows with \"good\" accuracy and mod verbal/gestural cues for form.     Cognitive Therapy:  Given a functional problem solving task, patient completed task with 50% accuracy, increased to 100% accuracy given modA. Patient at time referring to learning disability as reasoning for not being able to complete task and endorsing frustration.       Clinical Impressions  Patient presents with moderate oral and a mild pharyngeal dysphagia per FEES study completed 7/6/24. Patient appears appropriate to upgrade diet to soft/bite sized solids. Service following.    Patient presents with a cognitive deficits in memory and reasoning but may also benefit from further assessment of reading and writing. Currently pt will benefit from direct supervision with IADLs upon d/c.     Recommendations  Treatment Completed: Dysphagia Treatment, Cognitive Treatment       Dysphagia Treatment  Diet Consistency: " Soft/bite sized solids, thin liquids  Instrumentation: None indicated at this time  Medication: Whole with puree  Supervision: Direct supervision during meals, Encourage self-feeding  Positioning: Fully upright and midline during oral intake  Risk Management : Small bites/sips, Monitor for left pocketing  Oral Care:  (TID after meals)         Cognitive Treatment  Cognitive Treatment: Structured task targeting memory and problem solving  Supervision Needs Upons Discharge: Direct assistance with IADLs (see below)  IADLs: Medication management, Financial management, Appointment management, Household chores, Cooking           SLP Treatment Plan  Treatment Plan: Dysphagia Treatment, Cognitive Treatment, Patient/Family/Caregiver Training  SLP Frequency: 4x Per Week  Estimated Duration: Until Therapy Goals Met      Anticipated Discharge Needs  Discharge Recommendations: Recommend post-acute placement for additional speech therapy services prior to discharge home  Therapy Recommendations Upon DC: Dysphagia Training, Cognitive-Linguistic Training, Community Re-Integration, Patient / Family / Caregiver Education      Patient / Family Goals  Patient / Family Goal #1: water, sprite, to go home  Goal #1 Outcome: Progressing as expected  Short Term Goals  Short Term Goal # 1: 7/11 Patient will consume SB6/TN0 with no s/sx of airway invasion or respiratory decline given min cues for safe swallow precautions.  Short Term Goal # 1 B : Patient will consume minced/moist solids, thin liquids with no s/sx of airway invasion or respiratory decline given min cues for safe swallow precautions.  Goal Outcome  # 1 B: Goal met  Short Term Goal # 2: Patient will complete OMEX x20 reps per session given min cues.  Goal Outcome # 2 : Progressing as expected  Short Term Goal # 3: Pt will complete functional memory tasks with >70% accuracy and min clinician cues  Goal Outcome  # 3: Progressing slower than expected  Short Term Goal # 4: Pt will  complete functional problem solving tasks with >70% accuracy and min clinician cues  Goal Outcome  # 4: Progressing slower than expected      Vivian Renee, SLP

## 2024-07-11 NOTE — ASSESSMENT & PLAN NOTE
R M2  S/p TNK and mechanical thrombectomy  Possibly related to underlying hypercoagulable state (Hx SLE/RA) vs MAB vs cardio-embolic  Antithrombin III negative.  Factor II and V pending  MRI confirms R MCA infarct, no other findings  TTE benign  Neuro and Physiatry consulted  LDL 88, A1c<6, Statin  Transitioned to apixaban 5mg BID  ASA for 3 months for ICA disection  DC on Zio patch

## 2024-07-11 NOTE — CONSULTS
Hospital Medicine Consultation    Date of Service  7/11/2024    Referring Physician  HILDA Epps    Consulting Physician  Mikey Underwood D.O.    Reason for Consultation  CVA    History of Presenting Illness  33 y.o. female who presented 7/5/2024 with a history of thrombocytosis, migraine, lupus, and rheumatoid arthritis.  She had been maintained on Plaquenil, and methotrexate for some time however the methotrexate was stopped, and the patient was started on Rinvoq recently.  Patient presented on July 5 after developing left-sided weakness suddenly which lasted about 30 minutes.  It resolved by the time she arrived to the emergency room.  She had CT imaging there which was interpreted as unremarkable.  And was sent to us.  Here she had a recurrence of her symptoms.  Imaging was repeated, and she was found to have evidence of a right M2 thrombus.  Neurology was consulted, and the patient was given IV TNK and taken emergently to the IR suite.  There she underwent mechanical thrombectomy with TICI 2C flow postprocedure.    Postprocedure she was monitored in the ICU.  Patient was requiring Levophed infusion to maintain a goal greater than 130 systolic.  She was monitored for signs of cerebral edema, and retained required 3% normal saline drip.  She was eventually titrated off the Levophed once her blood pressure goals were dropped on July 9.  She was noted to have a right ICA dissection, and MRI confirmed right MCA distribution stroke.    On my examination the patient reports she feels sleepy but is otherwise without pain.  She is aware of her left side, but states she is unable to move it.  She has no other complaints.    Review of Systems  Review of Systems   Constitutional:  Negative for chills and fever.   Respiratory:  Negative for cough and shortness of breath.    Cardiovascular:  Negative for chest pain, palpitations and leg swelling.   Gastrointestinal:  Negative for abdominal pain, diarrhea, nausea and  vomiting.   Genitourinary:  Negative for dysuria and urgency.   Musculoskeletal:  Negative for back pain.   Skin:  Negative for rash.   Neurological:  Positive for focal weakness. Negative for dizziness, loss of consciousness and headaches.       Past Medical History   has a past medical history of Abdominal pain, left upper quadrant, Anxiety, Bronchitis, Chest pain, Cough, Depression, Early satiety, Gastritis, Helicobacter pylori (H. pylori), Jaw pain, Migraine, Musculoskeletal pain, Otitis media, Right wrist pain, Sinus disorder, Sinusitis, SOB (shortness of breath), Thrombocytosis, Tonsillitis, URI (upper respiratory infection), and Yeast infection.    Surgical History   has a past surgical history that includes abdominal exploration.    Family History  family history includes Cancer in her paternal grandfather; Diabetes in her maternal grandmother, mother, paternal grandfather, and paternal grandmother; Heart Disease in her maternal grandmother; Hypertension in her father.    Social History   reports that she has never smoked. She has never used smokeless tobacco. She reports that she does not drink alcohol and does not use drugs.    Medications  Prior to Admission Medications   Prescriptions Last Dose Informant Patient Reported? Taking?   HYDROcodone-acetaminophen (NORCO) 5-325 MG Tab per tablet unknown at unknown Family Member, Rx Bottle (For Med Information) No No   Sig: Take 1 Tablet by mouth 1 time a day as needed (joint pain and body aches) for up to 30 days.   HYDROcodone-acetaminophen (NORCO) 5-325 MG Tab per tablet new script at n/a Family Member, Rx Bottle (For Med Information) No No   Si pill by mouth once in a day with food as needed for severe joint pain and bodyaches.  Rx for 30 days.  Do not fill till 2024   HYDROcodone-acetaminophen (NORCO) 5-325 MG Tab per tablet new script at n/a Family Member, Rx Bottle (For Med Information) No No   Si pill by mouth once a day as needed for severe  joint pain and bodyaches.  Rx for 30 days.  Do not fill till 2024.   LORazepam (ATIVAN) 1 MG Tab unknown at unknown Family Member, Rx Bottle (For Med Information) No No   Sig: Take 1 Tablet by mouth 1 time a day as needed for Anxiety (anxiousness) for up to 90 days. Do not fill till 24   Upadacitinib ER (RINVOQ) 15 MG TABLET SR 24 HR unknown at unknown Family Member, Rx Bottle (For Med Information) No No   Sig: Take 15 mg by mouth every day at 6 PM.   acetaminophen/caffeine/butalbital 300-40-50 mg (FIORICET) -40 MG Cap capsule unknown at unknown Family Member, Rx Bottle (For Med Information) No No   Sig: Take 1 Capsule by mouth 1 time a day as needed for Headache for up to 30 days.   amitriptyline (ELAVIL) 100 MG Tab unknown at unknown Family Member, Rx Bottle (For Med Information) No No   Sig: TAKE ONE TABLET BY MOUTH AT BEDTIME AS NEEDED   Patient taking differently: sleep   docusate sodium (COLACE) 100 MG Cap unknown at unknown Family Member, Rx Bottle (For Med Information) Yes Yes   Sig: Take 100 mg by mouth 2 times a day.   folic acid (FOLVITE) 1 MG Tab unknown at unknown Family Member, Rx Bottle (For Med Information) No No   Sig: TAKE TWO TABLETS BY MOUTH DAILY   gabapentin (NEURONTIN) 300 MG Cap unknown at unknown Family Member, Rx Bottle (For Med Information) No No   Sig: Take 1 Capsule by mouth 2 times a day.   hydroxychloroquine (PLAQUENIL) 200 MG Tab unknown at unknown Family Member, Rx Bottle (For Med Information) No No   Sig: TAKE TWO TABLETS BY MOUTH MONDAY-FRIDAY. TAKE ONE TABLET BY MOUTH DAILY ON SATURDAY AND    methotrexate 2.5 MG tablet 2024 at unknown Family Member, Rx Bottle (For Med Information) No No   Sig: Take 8 Tablets by mouth every 7 days.   metoprolol SR (TOPROL XL) 25 MG TABLET SR 24 HR unknown at unknown Family Member, Rx Bottle (For Med Information) Yes No   Si mg every day.   metronidazole (METROCREAM) 0.75 % cream unknown at unknown Family Member, Rx  Bottle (For Med Information) No No   Sig: Apply 1 Application topically 2 times a day.   naproxen (NAPROSYN) 500 MG Tab unknown at unknown Family Member, Rx Bottle (For Med Information) No No   Sig: Take 1 Tablet by mouth 2 times daily with meals as needed (moderate pain).   phentermine 15 MG capsule unknown at unknown Family Member, Rx Bottle (For Med Information) No No   Sig: Take 1 Capsule by mouth every morning for 60 days.   rosuvastatin (CRESTOR) 5 MG Tab unknown at unknown Family Member, Rx Bottle (For Med Information) No No   Sig: TAKE 1/2 TO 1 TABLET BY MOUTH EVERY EVENING AS DIRECTED   Patient taking differently: 2.5-5 mg every evening. TAKE 1/2 TO 1 TABLET BY MOUTH EVERY EVENING AS DIRECTED    Alternating days      Facility-Administered Medications: None       Allergies  Allergies   Allergen Reactions    Pcn [Penicillins] Rash    Augmentin Diarrhea     diarrhea    Zithromax [Azithromycin] Rash     Rash       Physical Exam  Temp:  [35.9 °C (96.6 °F)-37 °C (98.6 °F)] 36.4 °C (97.5 °F)  Pulse:  [] 91  Resp:  [11-36] 31  BP: (121-180)/() 147/64  SpO2:  [82 %-100 %] 97 %    Physical Exam  Constitutional:       General: She is not in acute distress.     Appearance: Normal appearance. She is well-developed. She is not diaphoretic.   HENT:      Head: Normocephalic and atraumatic.   Neck:      Vascular: No JVD.   Cardiovascular:      Rate and Rhythm: Normal rate and regular rhythm.      Heart sounds: No murmur heard.  Pulmonary:      Effort: Pulmonary effort is normal. No respiratory distress.      Breath sounds: No stridor. No wheezing or rales.   Abdominal:      Palpations: Abdomen is soft.      Tenderness: There is no abdominal tenderness. There is no guarding or rebound.   Skin:     General: Skin is warm and dry.      Findings: No rash.   Neurological:      Mental Status: She is oriented to person, place, and time.      Comments: Alert interactive  Speech clear  Face symmetric  Tongue  midline  EOMI  Attends to the L spontanesouly  L arm and leg flacid   Psychiatric:         Mood and Affect: Mood normal.         Behavior: Behavior normal.         Thought Content: Thought content normal.         Fluids  Date 07/11/24 0700 - 07/12/24 0659   Shift 8060-1126 8420-4776 8029-0847 24 Hour Total   INTAKE   P.O. 360   360   I.V. 26.1   26.1   Shift Total 386.1   386.1   OUTPUT   Urine 295   295   Shift Total 295   295   Weight (kg) 80.9 80.9 80.9 80.9       Laboratory  Recent Labs     07/09/24  0503 07/10/24  0010 07/10/24  0558 07/11/24  0545   WBC 7.5  --  8.9 8.8   RBC 3.00*  --  3.10* 3.18*   HEMOGLOBIN 9.9* 11.4* 9.9* 10.2*   HEMATOCRIT 29.7* 34.9* 30.4* 31.1*   MCV 99.0*  --  98.1* 97.8   MCH 33.0  --  31.9 32.1   MCHC 33.3  --  32.6 32.8   RDW 50.5*  --  48.6 49.5   PLATELETCT 370  --  420 387   MPV 9.3  --  9.4 9.0     Recent Labs     07/09/24  0503 07/09/24  0643 07/10/24  0558 07/10/24  1326 07/10/24  1748 07/10/24  2357 07/11/24  0545   SODIUM 143   < > 147*   < > 142 144 142   POTASSIUM 3.6  --  4.1  --   --   --  3.5*   CHLORIDE 112  --  117*  --   --   --  109   CO2 20  --  21  --   --   --  23   GLUCOSE 123*  --  114*  --   --   --  95   BUN 2*  --  3*  --   --   --  4*   CREATININE 0.36*  --  0.39*  --   --   --  0.48*   CALCIUM 8.2*  --  8.2*  --   --   --  8.3*    < > = values in this interval not displayed.                     Imaging  DX-SHOULDER 2+ LEFT   Final Result      1.  Normal shoulder series.      CT-HEAD W/O   Final Result         1. Limited by motion.   2. No definite new abnormality.               DX-CHEST-LIMITED (1 VIEW)   Final Result         New right IJ central venous catheter is in good position without a pneumothorax.      CT-HEAD W/O   Final Result      Diffuse low-attenuation of the right hemisphere is consistent with a MCA territory infarct. Small areas of increased density within this region may represent petechial hemorrhages. There is effacement of the right  lateral ventricle with approximately 3    mm of midline shift.               CT-CTA PELVIS WITH & W/O-POST PROCESS   Final Result      1.  No CTA evidence of active extravasation of contrast to suggest active bleeding.   2.  There is a subcutaneous hematoma in the right groin adjacent to the common femoral vessels.   3.  Contrast in the urinary bladder from the angiographic procedure earlier today.      EC-ECHOCARDIOGRAM COMPLETE W/O CONT   Final Result      MR-BRAIN-W/O   Final Result         Acute infarct in the right frontoparietal region and right insula predominantly involving the cortex. Acute infarct also noted in the right caudate and putamen.      Minimal subarachnoid hemorrhage noted in the sylvian fissure and the sulci over the convexity, likely procedure related.      No midline shift or significant mass effect.      IR-THROMBO MECHANICAL ARTERY,INIT   Final Result         33-year-old who presented with left-sided symptoms was found to have a right MCA M3 occlusion with a perfusion defect identified on CT perfusion imaging. Patient underwent emergent mechanical thrombectomy with multiple attempts to recanalize the parietal    M3 branch being unsuccessful. Final angiographic images demonstrate good antegrade flow in the MCA branches other than the occluded right MCA M3 parietal branch.      Short segment dissection of the right ICA just below the skull base was identified on the final angiogram.  Findings discussed with Dr. Fernandez and given the nonprogressive nature of the dissection without flow limitation, the dissection will be managed by    initiation of antithrombotic/anticoagulation therapy.      Final recanalization score: TICI 2 C      I, Kavita Matson was physically present and participated during the entire procedure of the IR-THROMBO MECHANICAL ARTERY,INIT.                  DX-CHEST-PORTABLE (1 VIEW)   Final Result      Mild interstitial prominence could be related to hypoinflation. No  "consolidation or pleural effusion.      CT-CTA NECK WITH & W/O-POST PROCESSING   Final Result      CT angiogram of the neck within normal limits.      CT-CTA HEAD WITH & W/O-POST PROCESS   Final Result      Occlusion of the superior sylvian branch M2 segment RIGHT middle cerebral artery.      These findings were discussed with GAIL AGUIRRE II on 7/5/2024 5:33 AM.            CT-CEREBRAL PERFUSION ANALYSIS   Final Result      1. Cerebral blood flow less than 30% possibly representing completed infarct = 5 mL. Based on distribution of this finding, this is unlikely to represent artifact.      2. T Max more than 6 seconds possibly representing combination of completed infarct and ischemia = 28 mL. Based on the distribution of this finding, this is unlikely to represent artifact.      3. Mismatched volume possibly representing ischemic brain/penumbra= 23 mL      4.  Please note that this cerebral perfusion study and report is Quantitative and targets supratentorial (cerebral) perfusion for evaluation of large vessel territory acute ischemia/infarction. For example, lacunar infarcts, and brainstem/posterior fossa    ischemia/infarction are not evaluated on this study.  Data acquisition is subject to artifacts which can yield non-anatomically plausible perfusion maps which may be due to motion, bolus timing, signal to noise ratio, or other technical factors.    Perfusion map abnormalities which show non-anatomic distributions are likely artifact.   This study is not \"stand-alone\" and should only be utilized for diagnosis, management/treatment in correlation with CT, CTA, and/or MRI and clinical factors.             Assessment/Plan  * Acute ischemic right MCA stroke (HCC)- (present on admission)  Assessment & Plan  R M2  S/p TNK and mechanical thrombectomy  Possibly related to underlying hypercoagulable state (Hx SLE/RA) vs MAB vs cardio-embolic  MRI confirms R MCA infarct, no other findings  EKG and Tele have been " sinus  TTE benign  Neuro and Physiatry following  PT/OT/SLP involved  LDL 88  A1c<6  Statin  On enoxaparin plan transition to apixaban 5mg BID  ASA for 3 months for ICA disection  Plan rehab DC    Anemia  Assessment & Plan  Reporducitve age female  Monitor  Follow up as outpt    Hyperglycemia  Assessment & Plan  A1c<5: stress response  monitor    Hypokalemia  Assessment & Plan  Follow and replace as needed  Follow and replace Mg    Hypomagnesemia  Assessment & Plan  1.8 today: giving 2g IV MgSO4  Repeat lab in am      SLE (systemic lupus erythematosus related syndrome) (HCC)- (present on admission)  Assessment & Plan  Had been on Plaquenil and methotrexate for some time and then was transition to Plaquenil and Rinvoq.  Given her recent thromboembolic event, we have stopped her invoke.  Continue on Plaquenil  Follow-up with her outpatient rheumatology team    Thrombocytosis- (present on admission)  Assessment & Plan  Patient has chronic thrombocytosis with platelet count runs in the 500s which is where she is now.    Mixed dyslipidemia- (present on admission)  Assessment & Plan  statin    Anxiety- (present on admission)  Assessment & Plan  Prn support

## 2024-07-11 NOTE — ASSESSMENT & PLAN NOTE
Had been on Plaquenil and methotrexate for some time and then was transition to Plaquenil and Rinvoq.  Given her recent thromboembolic event, we have stopped her invoke.  Continue on Plaquenil  Follow-up with her outpatient rheumatology team

## 2024-07-11 NOTE — DISCHARGE PLANNING
Discussed Ellie with Medial Director.  He does not think that the patient will make enough progress after 14-21 days of IPR  to be able to return home without physical assistance.  Mom is unable to provide heavy lifting and physical assistance, therefore Ellie does not have a stable discharge plan.    Recommending SNF  Please reach out if PT/OT improve or need for medical management.    [facility] no longer following

## 2024-07-11 NOTE — PROGRESS NOTES
Critical Care Progress Note    Date of admission  7/5/2024    Chief Complaint  33 y.o. female admitted 7/5/2024 with acute CVA    Hospital Course  Ms. Sánchez is a 33 y.o. 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  She was seen in the emergency department at [facility], but symptoms resolved at that time.  She had a normal head CT without contrast, but a CTA head showed a right M3 occlusion.  She was transferred to [facility] on 7/5/2024 without requiring thrombolytics.  While in the ED, she again developed symptoms and a repeat CTA at that time showed an right M2 occlusion for which she was administered TNK and went to IR for mechanical thrombectomy.  Unfortunately the thrombectomy was unsuccessful and resulted in an ICA dissection with essentially TICI 0 flow.  She was admitted to the intensive care unit for ongoing critical care management.  7/6 - levophed infusion for SBP goals > 130 and < 170, neurosurgery consulted to monitor for need for prophylactic hemocrani, 3% started  7/7 - levo drip, ongoing left sided weakness, 23% bolus needed for reach Na goal  7/8 - SBP goals > 120-->levo ongoing, 3% ongoing, precedex on/off overnight for anxiety  7/9 - levo off, 3% ongoing, improved anxiety today  7/10 - worsened agitation/anxiety overnight, back on precedex, weaned off 3%    Interval Problem Update  Reviewed last 24 hour events:   - pt had a better night with less anxiety and night time agitation   - a/ox4, but sleepy, no change to neuro status   - Precedex at 0.6   - SR 70-90s   - -180s   - Tmax 98.2   - level 5 diet   - last BM on 7/10   - UOP of 1 liters overnight, Nunez   - off 3%   - PT/OT   -  no RT issues   - therapeutic lovenox   - Na 142   - K 3.5   - creat 0.48   - mg 1.8   - Hb 10    Yesterday's Events:   - pt with more agitation and anxiety overnight-->started on Precedx again   - precedex now at 1.2, haldol and xanax   - sleepy, a/ox4, LUE flaccid, w/d to left leg   - c/o should pain   - SR/ST 60-140s   - -180s   - afebrile   - level 5 diet   - last BM 7.9   - UOP of 1.8 liters overnight, Nunez   - 3% at 80cc/hr   - PT/OT   - no RT issues   - lovenox, therapeutic   - Na 147   - Hb 9.9   - K 4.1   - creat 0.39   - Mg 1.9    Review of Systems  Review of Systems   Constitutional:  Positive for malaise/fatigue. Negative for fever.   HENT:  Negative for sore throat.    Eyes:  Negative for double vision.   Respiratory:  Negative for cough and shortness of breath.    Cardiovascular:  Negative for chest pain.   Gastrointestinal:  Negative for abdominal pain, diarrhea and nausea.   Genitourinary:  Negative for flank pain.   Musculoskeletal:  Negative for back pain and myalgias.   Skin:  Negative for itching.   Neurological:  Positive for sensory change, focal weakness and headaches. Negative for dizziness and speech change.   Endo/Heme/Allergies:  Does not bruise/bleed easily.   Psychiatric/Behavioral:  Negative for depression. The patient is nervous/anxious and has insomnia.    All other systems reviewed and are negative.       Vital Signs for last 24 hours   Temp:  [35.9 °C (96.6 °F)-37 °C (98.6 °F)] 35.9 °C (96.6 °F)  Pulse:  [] 77  Resp:  [11-36] 25  BP: (121-182)/() 125/58  SpO2:  [82 %-100 %] 94 %    Respiratory Information for the last 24 hours      Physical Exam   Physical Exam  Vitals and nursing note reviewed.   Constitutional:       General: She is sleeping.      Appearance: She is well-developed. She is obese. She is ill-appearing. She is not toxic-appearing.      Interventions: She is restrained.      Comments: Pleasant/cooperative/calm   HENT:      Head: Normocephalic.       Right Ear: External ear normal.      Left Ear: External ear normal.      Nose: Nose normal. No rhinorrhea.      Mouth/Throat:      Mouth: Mucous membranes are moist.      Pharynx: Oropharynx is clear.   Eyes:      General: No scleral icterus.     Conjunctiva/sclera: Conjunctivae normal.      Pupils: Pupils are equal, round, and reactive to light.   Neck:      Vascular: No JVD.      Trachea: No tracheal deviation.   Cardiovascular:      Rate and Rhythm: Normal rate and regular rhythm.      Chest Wall: PMI is not displaced.      Pulses: Normal pulses.           Radial pulses are 2+ on the right side and 2+ on the left side.        Dorsalis pedis pulses are 2+ on the right side and 2+ on the left side.      Heart sounds: Normal heart sounds. No murmur heard.  Pulmonary:      Effort: Pulmonary effort is normal. No tachypnea or respiratory distress.      Breath sounds: No wheezing, rhonchi or rales.   Abdominal:      General: Bowel sounds are normal. There is no distension.      Palpations: Abdomen is soft.      Tenderness: There is no abdominal tenderness. There is no guarding or rebound.      Comments: Right groin:  improving hematoma/bruising   Genitourinary:     Comments: Nunez in place  Musculoskeletal:         General: No tenderness.      Cervical back: Neck supple.      Right lower leg: No edema.      Left lower leg: No edema.   Lymphadenopathy:      Cervical: No cervical adenopathy.   Skin:     General: Skin is warm and dry.      Capillary Refill: Capillary refill takes less than 2 seconds.      Findings: No rash.   Neurological:      Mental Status: She is oriented to person, place, and time and easily aroused.      Cranial Nerves: Cranial nerve deficit present.      Sensory: No sensory deficit.      Motor: Weakness present.      Comments: A/ox4, persistent left-sided weakness and neglect with facial droop   Psychiatric:         Mood and Affect: Mood normal.         Behavior: Behavior normal. Behavior is  cooperative.         Medications  Current Facility-Administered Medications   Medication Dose Route Frequency Provider Last Rate Last Admin    QUEtiapine (SEROquel) tablet 25 mg  25 mg Oral Nightly Lili Epps M.D.   25 mg at 07/10/24 2134    ALPRAZolam (Xanax) tablet 0.5 mg  0.5 mg Oral Q4HRS PRN Lili Epps M.D.        baclofen (Lioresal) tablet 5 mg  5 mg Oral TID Lili Epps M.D.   5 mg at 07/11/24 0537    sodium chloride (Salt) tablet 3 g  3 g Oral Q8HRS Lili Epps M.D.   3 g at 07/11/24 0539    ferric gluconate complex (Ferrlecit) 250 mg in  mL IVPB  250 mg Intravenous BID Lili Epps M.D. 100 mL/hr at 07/11/24 0527 250 mg at 07/11/24 0527    fludrocortisone (Florinef) tablet 0.1 mg  0.1 mg Oral BID Enrrique Welsh M.D.   0.1 mg at 07/11/24 0538    haloperidol lactate (Haldol) injection 1 mg  1 mg Intravenous Q4HRS PRN Pete Dillard M.D.   1 mg at 07/09/24 2308    dexmedetomidine (Precedex) 400 mcg/100mL infusion  0.1-1.5 mcg/kg/hr (Order-Specific) Intravenous Continuous Pete Dillard M.D. 7.6 mL/hr at 07/11/24 0400 0.7 mcg/kg/hr at 07/11/24 0400    simethicone (Mylicon) chewable tablet 125 mg  125 mg Oral TID PRN Lili Epps M.D.   125 mg at 07/09/24 1934    hydroxychloroquine (Plaquenil) tablet 400 mg  400 mg Oral Once per day on Monday Tuesday Wednesday Thursday Friday Lili Epps M.D.   400 mg at 07/11/24 0529    And    [START ON 7/13/2024] hydroxychloroquine (Plaquenil) tablet 200 mg  200 mg Oral Once per day on Sunday Saturday Lili Epps M.D.        MD Alert...ICU Electrolyte Replacement per Pharmacy   Other PHARMACY TO DOSE Jeremy M Gonda, M.D.        labetalol (Normodyne/Trandate) injection 10 mg  10 mg Intravenous Q10 MIN PRN Jeremy M Gonda, M.D.   10 mg at 07/10/24 1913    hydrALAZINE (Apresoline) injection 10 mg  10 mg Intravenous Q2HRS PRN Jeremy M Gonda, M.D.   10 mg at 07/10/24 1804    acetaminophen (Tylenol) tablet 650 mg   650 mg Oral Q6HRS PRN Jeremy M Gonda, M.D.   650 mg at 07/09/24 1934    ondansetron (Zofran) syringe/vial injection 4 mg  4 mg Intravenous Q4HRS PRN Jeremy M Gonda, M.D.   4 mg at 07/07/24 0336    ondansetron (Zofran ODT) dispertab 4 mg  4 mg Oral Q4HRS PRN Jeremy M Gonda, M.D.        promethazine (Phenergan) tablet 12.5-25 mg  12.5-25 mg Oral Q4HRS PRN Jeremy M Gonda, M.D.        promethazine (Phenergan) suppository 12.5-25 mg  12.5-25 mg Rectal Q4HRS PRN Jeremy M Gonda, M.D.        prochlorperazine (Compazine) injection 5-10 mg  5-10 mg Intravenous Q4HRS PRN Jeremy M Gonda, M.D.   5 mg at 07/05/24 1322    amitriptyline (Elavil) tablet 100 mg  100 mg Oral QHS PRN Jeremy M Gonda, M.D.   100 mg at 07/09/24 2208    docusate sodium (Colace) capsule 100 mg  100 mg Oral BID Jeremy M Gonda, M.D.   100 mg at 07/09/24 0633    folic acid (Folvite) tablet 2 mg  2 mg Oral DAILY Jeremy M Gonda, M.D.   2 mg at 07/11/24 0541    gabapentin (Neurontin) capsule 300 mg  300 mg Oral BID Jeremy M Gonda, M.D.   300 mg at 07/11/24 0540    rosuvastatin (Crestor) tablet 5 mg  5 mg Oral Q EVENING Jeremy M Gonda, M.D.   5 mg at 07/10/24 1738    enoxaparin (Lovenox) inj 80 mg  1 mg/kg Subcutaneous Q12HRS Jeremy M Gonda, M.D.   80 mg at 07/11/24 0536    aspirin EC tablet 81 mg  81 mg Oral DAILY Jeremy M Gonda, M.D.   81 mg at 07/11/24 0542    HYDROcodone-acetaminophen (Norco) 5-325 MG per tablet 1 Tablet  1 Tablet Oral Q8HRS PRN Jeremy M Gonda, M.D.   1 Tablet at 07/10/24 2134       Fluids    Intake/Output Summary (Last 24 hours) at 7/11/2024 0614  Last data filed at 7/11/2024 0527  Gross per 24 hour   Intake 1512.14 ml   Output 3000 ml   Net -1487.86 ml       Laboratory          Recent Labs     07/09/24  0503 07/09/24  0643 07/10/24  0558 07/10/24  1326 07/10/24  1748 07/10/24  2357   SODIUM 143   < > 147* 143 142 144   POTASSIUM 3.6  --  4.1  --   --   --    CHLORIDE 112  --  117*  --   --   --    CO2 20  --  21  --   --   --    BUN 2*  --   3*  --   --   --    CREATININE 0.36*  --  0.39*  --   --   --    MAGNESIUM 1.7  --  1.9  --   --   --    CALCIUM 8.2*  --  8.2*  --   --   --     < > = values in this interval not displayed.     Recent Labs     07/09/24  0503 07/10/24  0558   GLUCOSE 123* 114*     Recent Labs     07/09/24  0503 07/10/24  0558 07/11/24  0545   WBC 7.5 8.9 8.8   NEUTSPOLYS 67.10 73.90* 64.00   LYMPHOCYTES 20.70* 16.00* 23.60   MONOCYTES 6.50 6.80 7.10   EOSINOPHILS 4.80 2.50 4.20   BASOPHILS 0.50 0.30 0.50     Recent Labs     07/08/24  0938 07/09/24  0503 07/09/24  0503 07/10/24  0010 07/10/24  0558 07/11/24  0545   RBC  --  3.00*  --   --  3.10* 3.18*   HEMOGLOBIN  --  9.9*   < > 11.4* 9.9* 10.2*   HEMATOCRIT  --  29.7*   < > 34.9* 30.4* 31.1*   PLATELETCT  --  370  --   --  420 387   IRON 29*  --   --   --   --   --    TOTIRONBC 252  --   --   --   --   --     < > = values in this interval not displayed.       Imaging  CT:    Reviewed    Assessment/Plan  * Acute ischemic right MCA stroke (HCC)- (present on admission)  Assessment & Plan  With M2 occlusion s/p TNK 7/5 at 0528, attempted thrombectomy at 0725 with no flow and right distal ICA dissection  Continue systemic anticoagulation with Lovenox, eventual transition to coumadin vs NOAC (heme consulted) Continue antiplatelet with aspirin  Neurology, NSG and neuro IR consulted, peak swelling window is closed   Continue statin  Goal -160   S/p 3% infusion, salt tabs 3g every 8 hours  Hematology consulted given hypercoagulable condition, hypercoagulable workup pending  PT/OT/SLP   PM&R consulted-->advised Baclofen 5mg TID and started for increased tone to LUE      Anemia  Assessment & Plan  Likely due to hematoma around arterial puncture site -->improving  Daily CBC with conservative transfusion strategy    Hyperglycemia  Assessment & Plan  Improved  Discontinue insulin sliding scale    Hypokalemia  Assessment & Plan  Replete with KCl 40meg PO    Hypomagnesemia  Assessment &  Plan  Replete again with 2g [id number]     SLE (systemic lupus erythematosus related syndrome) (HCC)- (present on admission)  Assessment & Plan  Followed by Dr. Morales for Lupus, RA, and thrombocytosis  Continue hydroxychloroquine and will stop Rinvoq due to increased cardiovascular phenomenon   Hematology consulted    Thrombocytosis- (present on admission)  Assessment & Plan  Chronic, followed by outpatient hematology in [place].    Mixed dyslipidemia- (present on admission)  Assessment & Plan  Continue rosuvastatin    Anxiety- (present on admission)  Assessment & Plan  History of chronic anxiety treated with Ativan, gabapentin  Continue gabapentin  Reassurance  Increase nightly Seroquel to 50mg PO  Will start Lexapro 10mg PO daily  I am actively titrating Precedex for RASS goals of -1 to +1         VTE:  Lovenox  Ulcer: Not Indicated  Lines: Central Line  Ongoing indication addressed and Nunez Catheter  Ongoing indication addressed    I have performed a physical exam and reviewed and updated ROS and Plan today (7/11/2024). In review of yesterday's note (7/10/2024), there are no changes except as documented above.     The patient remains critically ill today requiring active titration of Precedex infusion for RASS goals of -1 to +1.  She is off 3% infusion and is no longer requiring vasopressors.  The patient's peak cerebral swelling window has closed and can be transferred to the IMCU for continued care while on Precedex infusion.  I have assessed and reassessed the patient's neurological status, hemodynamics, and respiratory status.  The patient remains at high risk of clinical deterioration, worsening vital organ dysfunction, and death without the above critical care interventions.    Discussed patient condition and risk of morbidity and/or mortality with Hospitalist, Family, RN, RT, Pharmacy, Charge nurse / hot rounds, Patient, and neurology. Critical care time = 65 minutes in directly providing and  coordinating critical care and extensive data review.  No time overlap and excludes procedures.

## 2024-07-11 NOTE — THERAPY
Physical Therapy   Daily Treatment     Patient Name: Ellie Sánchez  Age:  33 y.o., Sex:  female  Medical Record #: [id number]  Today's Date: 7/11/2024     Precautions  Precautions: Fall Risk;Other (See Comments)  Comments: BP goal: 110-160/60-80    Assessment    Patient seen for PT treatment session. Patient in bed, agreeable for the session. Able to participate with bed mobility, sit-stand reps, chair transfers as detailed below. Will continue to benefit from PT services and recommend post-acute placement at this time.     Plan    Treatment Plan Status: Modify Current Treatment Plan  Type of Treatment: Bed Mobility, Equipment, Family / Caregiver Training, Neuro Re-Education / Balance, Therapeutic Activities, Therapeutic Exercise  Treatment Frequency: Daily  Treatment Duration: Until Therapy Goals Met    DC Equipment Recommendations: Unable to determine at this time  Discharge Recommendations: Recommend post-acute placement for additional physical therapy services prior to discharge home    Objective     07/11/24 1409   Precautions   Precautions Fall Risk;Other (See Comments)   Comments BP goal: 110-160/60-80   Vitals   Pulse 88   Patient BP Position Caldwell's Position   Blood Pressure (!) 141/68   Pulse Oximetry 96 %   O2 Delivery Device None - Room Air   Pain   Pain Scales 0 to 10 Scale    Intervention Medication (see MAR);Rest;Repositioned   Pain 0 - 10 Group   Location Shoulder   Location Orientation Left   Therapist Pain Assessment During Activity   Cognition    Cognition / Consciousness X   Level of Consciousness Alert   Ability To Follow Commands 2 Step   Safety Awareness Impaired   New Learning Impaired   Attention Impaired   Sequencing Impaired   Passive ROM Lower Body   Passive ROM Lower Body WDL   Active ROM Lower Body    Active ROM Lower Body  X   Comments No active movements on LLE   Strength Lower Body   Lower Body Strength  X   Comments Slight activation of hip abductor when attempting to move LLE  OOB   Lower Body Muscle Tone   Lower Body Muscle Tone  X   Lt Lower Extremity Muscle Tone Hypertonic   Modified Rene Scale Left Lower Extremity 1   Neuro-Muscular Treatments   Neuro-Muscular Treatments Anterior weight shift;Postural Changes;Postural Facilitation;Sequencing;Verbal Cuing   Comments Seated EOB; Standing within Maulik Steady with BUE on the bar in-front   Vision   Vision Comments Patient able to scan from R-L with cues, able to turn her head all the way to the L and name objects. Continues to have L side inattention.   Other Treatments   Other Treatments Provided Discussed with patient's aunt (patient's mom's sister), infront of patient, regarding amount of support available at home. Aunt mentioned that they are exploring different resources, they all understand that patient will continue to require physical assistance upon DC from rehab. Discussed with patient regarding realistic goals with mobility-bed mobility and WC transfers at this time. Patient exhibited understanding.   Balance   Sitting Balance (Static) Poor +  (~10 seconds)   Sitting Balance (Dynamic) Trace +   Standing Balance (Static) Poor +  (~ 120s within maulik steady)   Standing Balance (Dynamic) Trace +   Weight Shift Sitting Fair   Weight Shift Standing Poor   Skilled Intervention Verbal Cuing;Sequencing;Facilitation;Compensatory Strategies   Comments Seated EOB; Standing within Maulik Steady   Bed Mobility    Supine to Sit Maximal Assist   Scooting Minimal Assist  (Towards EOB; required multiple reps)   Rolling Moderate Assist to Lt.   Skilled Intervention Verbal Cuing;Sequencing;Facilitation;Compensatory Strategies   Comments HOB flat, use of bed rail, towards L side of the bed; cues for sequencing of task   Gait Analysis   Gait Level Of Assist Unable to Participate   Functional Mobility   Sit to Stand Moderate Assist  (with PT support anteriorly; CGA-Within maulik steady; support provided to LUE;)   Bed, Chair, Wheelchair Transfer  Maximal Assist  (Squat pivot; Total assist-steady pivot lift)   Transfer Method Squat Pivot;Steady Pivot Lift   Mobility Bed-EOB-sit-stand with maulik steady-transfer to recliner-squat pivot towards R to chair with arm rest-sit-stand with anterior support in-front-squat pivot transfer towards R to recliner-sit-stand within maulik steady-standing tolerance (~ 2 minutes)   Skilled Intervention Verbal Cuing;Sequencing;Facilitation;Compensatory Strategies   Comments Steady pivot lift used for transfer OOB since patient's feet does not reach the floor-for safety concerns. Cues for sequencing of task, hand placement, LE placement   6 Clicks Assessment - How much HELP from from another person do you currently need... (If the patient hasn't done an activity recently, how much help from another person do you think he/she would need if he/she tried?)   Turning from your back to your side while in a flat bed without using bedrails? 2   Moving from lying on your back to sitting on the side of a flat bed without using bedrails? 2   Moving to and from a bed to a chair (including a wheelchair)? 2   Standing up from a chair using your arms (e.g., wheelchair, or bedside chair)? 2   Walking in hospital room? 1   Climbing 3-5 steps with a railing? 1   6 clicks Mobility Score 10   Activity Tolerance   Sitting in Chair Post session   Patient / Family Goals    Patient / Family Goal #1 To get stronger and return home   Goal #1 Outcome Progressing slower than expected   Short Term Goals    Short Term Goal # 1 Patient will perform supine-sit, sit-supine with HOB raised with use of bed rail with CGA in 6 visits to progress with bed mobility   Goal Outcome # 1 Progressing slower than expected   Short Term Goal # 2 Patient will perform sit-stand with LRAD with CGA in 6 visits to progress with functional mobility   Goal Outcome # 2 Progressing slower than expected   Short Term Goal # 3 Patient will perform chair transfers with LRAD with Min A in 6  visits to progress with OOB to chair for meals   Goal Outcome # 3 Progressing slower than expected   Short Term Goal # 4 Patient will sit EOB with Fair (-) balance, with CGA, for about 20 minutes to improve upright sitting and sitting balance.   Goal Outcome # 4 Progressing slower than expected   Physical Therapy Treatment Plan   Physical Therapy Treatment Plan Modify Current Treatment Plan   Treatment Frequency Daily   Anticipated Discharge Equipment and Recommendations   DC Equipment Recommendations Unable to determine at this time   Discharge Recommendations Recommend post-acute placement for additional physical therapy services prior to discharge home   Interdisciplinary Plan of Care Collaboration   IDT Collaboration with  Nursing;Family / Caregiver;Therapy Tech;   Patient Position at End of Therapy Seated;Chair Alarm On;Call Light within Reach;Tray Table within Reach;Family / Friend in Room  (Aunt at bedside)   Session Information   Date / Session Number  7/11-5(5/5, 7/14)

## 2024-07-11 NOTE — CARE PLAN
The patient is Watcher - Medium risk of patient condition declining or worsening    Shift Goals  Clinical Goals: Q 4 Neuro's, -160  Patient Goals: rest  Family Goals: updates    Progress made toward(s) clinical / shift goals:    Problem: Neuro Status  Goal: Neuro status will remain stable or improve  Outcome: Progressing     Problem: Hemodynamic Monitoring  Goal: Patient's hemodynamics, fluid balance and neurologic status will be stable or improve  Outcome: Progressing     Labetalol given PRN    Problem: Urinary Elimination  Goal: Establish and maintain regular urinary output  Outcome: Progressing     Problem: Bowel Elimination  Goal: Establish and maintain regular bowel function  Outcome: Progressing     Pt had BM    Problem: Self Care  Goal: Patient will have the ability to perform ADLs independently or with assistance (bathe, groom, dress, toilet and feed)  Outcome: Progressing     Pt provided oral care self    Problem: Pain - Standard  Goal: Alleviation of pain or a reduction in pain to the patient’s comfort goal  Outcome: Progressing     PRN pain med given    Patient is not progressing towards the following goals:

## 2024-07-11 NOTE — PROGRESS NOTES
Neurology Progress Note  Neurohospitalist Service, [facility]iences    Referring Physician: Lili Epps M.D.      Interval History: No acute events overnight.  Continues to require dex for some impulsivity/agitation.   SBPs in 120s-130s.    Review of systems: In addition to what is detailed in the HPI and/or updated in the interval history, all other systems reviewed and are negative.    Past Medical History, Past Surgical History and Social History reviewed and unchanged from prior    Medications:    Current Facility-Administered Medications:     QUEtiapine (SEROquel) tablet 25 mg, 25 mg, Oral, Nightly, Lili Epps M.D., 25 mg at 07/10/24 2134    ALPRAZolam (Xanax) tablet 0.5 mg, 0.5 mg, Oral, Q4HRS PRN, Lili Epps M.D.    baclofen (Lioresal) tablet 5 mg, 5 mg, Oral, TID, Lili Epps M.D., 5 mg at 07/11/24 0537    sodium chloride (Salt) tablet 3 g, 3 g, Oral, Q8HRS, Lili Epps M.D., 3 g at 07/11/24 0539    fludrocortisone (Florinef) tablet 0.1 mg, 0.1 mg, Oral, BID, Enrrique Welsh M.D., 0.1 mg at 07/11/24 0538    haloperidol lactate (Haldol) injection 1 mg, 1 mg, Intravenous, Q4HRS PRN, Pete Dillard M.D., 1 mg at 07/09/24 2308    dexmedetomidine (Precedex) 400 mcg/100mL infusion, 0.1-1.5 mcg/kg/hr (Order-Specific), Intravenous, Continuous, Pete Dillard M.D., Last Rate: 6.5 mL/hr at 07/11/24 0708, 0.6 mcg/kg/hr at 07/11/24 0708    simethicone (Mylicon) chewable tablet 125 mg, 125 mg, Oral, TID PRN, Lili Epps M.D., 125 mg at 07/09/24 1934    hydroxychloroquine (Plaquenil) tablet 400 mg, 400 mg, Oral, Once per day on Monday Tuesday Wednesday Thursday Friday, 400 mg at 07/11/24 0529 **AND** [START ON 7/13/2024] hydroxychloroquine (Plaquenil) tablet 200 mg, 200 mg, Oral, Once per day on Sunday Saturday, Lili Epps M.D.    MD Alert...ICU Electrolyte Replacement per Pharmacy, , Other, PHARMACY TO DOSE, Jeremy M Gonda, M.D.    labetalol  "(Normodyne/Trandate) injection 10 mg, 10 mg, Intravenous, Q10 MIN PRN, Jeremy M Gonda, M.D., 10 mg at 07/10/24 1913    hydrALAZINE (Apresoline) injection 10 mg, 10 mg, Intravenous, Q2HRS PRN, Jeremy M Gonda, M.D., 10 mg at 07/10/24 1804    acetaminophen (Tylenol) tablet 650 mg, 650 mg, Oral, Q6HRS PRN, Jeremy M Gonda, M.D., 650 mg at 07/09/24 1934    ondansetron (Zofran) syringe/vial injection 4 mg, 4 mg, Intravenous, Q4HRS PRN, Jeremy M Gonda, M.D., 4 mg at 07/07/24 0336    ondansetron (Zofran ODT) dispertab 4 mg, 4 mg, Oral, Q4HRS PRN, Jeremy M Gonda, M.D.    promethazine (Phenergan) tablet 12.5-25 mg, 12.5-25 mg, Oral, Q4HRS PRN, Jeremy M Gonda, M.D.    promethazine (Phenergan) suppository 12.5-25 mg, 12.5-25 mg, Rectal, Q4HRS PRN, Jeremy M Gonda, M.D.    prochlorperazine (Compazine) injection 5-10 mg, 5-10 mg, Intravenous, Q4HRS PRN, Jeremy M Gonda, M.D., 5 mg at 07/05/24 1322    amitriptyline (Elavil) tablet 100 mg, 100 mg, Oral, QHS PRN, Jeremy M Gonda, M.D., 100 mg at 07/09/24 2208    docusate sodium (Colace) capsule 100 mg, 100 mg, Oral, BID, Jeremy M Gonda, M.D., 100 mg at 07/09/24 0633    folic acid (Folvite) tablet 2 mg, 2 mg, Oral, DAILY, Jeremy M Gonda, M.D., 2 mg at 07/11/24 0541    gabapentin (Neurontin) capsule 300 mg, 300 mg, Oral, BID, Jeremy M Gonda, M.D., 300 mg at 07/11/24 0540    rosuvastatin (Crestor) tablet 5 mg, 5 mg, Oral, Q EVENING, Jeremy M Gonda, M.D., 5 mg at 07/10/24 1738    enoxaparin (Lovenox) inj 80 mg, 1 mg/kg, Subcutaneous, Q12HRS, Jeremy M Gonda, M.D., 80 mg at 07/11/24 0536    aspirin EC tablet 81 mg, 81 mg, Oral, DAILY, Jeremy M Gonda, M.D., 81 mg at 07/11/24 0542    HYDROcodone-acetaminophen (Norco) 5-325 MG per tablet 1 Tablet, 1 Tablet, Oral, Q8HRS PRN, Jeremy M Gonda, M.D., 1 Tablet at 07/10/24 8802    Physical Examination:   /58   Pulse 77   Temp 35.9 °C (96.6 °F) (Temporal)   Resp (!) 25   Ht 1.473 m (4' 10\")   Wt 80.9 kg (178 lb 5.6 oz)   LMP 06/12/2024 " (Exact Date)   SpO2 94%   BMI 37.28 kg/m²       General: Lethargic and in no acute distress  Neck: There is normal range of motion  CV: Regular rate   Extremities:  Warm, dry, and intact, without peripheral lower extremity edema    NEUROLOGICAL EXAM:     Mental status: Lethargic, easily arousable, fully oriented, follows commands  Speech and language: Speech is clear and fluent. The patient is able to name and repeat.  Cranial nerve exam: Visual fields are full. There is no nystagmus. Extraocular muscles are intact.  L face droop   Motor exam: There is sustained antigravity with no downward drift in R arm and leg.  There is no movement in the LUE.  LLE is briefly antigravity with drift to bed.  Sensory exam:  Reacts to tactile in all 4 extremities, however appears diminished on L side with associated neglect  Coordination: No ataxia on elicited movements      NIHSS: National Institutes of Health Stroke Scale    [0] 1a:Level of Consciousness    0-alert 1-drowsy   2-stupor   3-coma  [0] 1b:LOC Questions                  0-both  1-one      2-neither  [0] 1c:LOC Commands                   0-both  1-one      2-neither  [0] 2: Best Gaze                     0-nl    1-partial  2-forced  [0] 3: Visual Fields                   0-nl    1-partial  2-complete 3-bilat  [1] 4: Facial Paresis                0-nl    1-minor    2-partial  3-full  MOTOR                       0-nl  [0] 5: Right Arm           1-drift  [4] 6: Left Arm             2-some effort vs gravity  [0] 7: Right Leg           3-no effort vs gravity  [3] 8: Left Leg             4-no movement                             x-untestable  [0] 9: Limb Ataxia                    0-abs   1-1_limb   2-2+_limbs       x-untestable  [1] 10:Sensory                        0-nl    1-partial  2-dense  [0] 11:Best Language/Aphasia         0-nl    1-mild/mod 2-severe   3-mute  [0] 12:Dysarthria                     0-nl    1-mild/mod 2-severe       x-untestable  [1]  13:Neglect/Inattention            0-none  1-partial  2-complete  [10] TOTAL      Objective Data:    Labs:  Lab Results   Component Value Date/Time    PROTHROMBTM 13.9 07/05/2024 09:50 AM    INR 1.06 07/05/2024 09:50 AM      Lab Results   Component Value Date/Time    WBC 8.8 07/11/2024 05:45 AM    RBC 3.18 (L) 07/11/2024 05:45 AM    HEMOGLOBIN 10.2 (L) 07/11/2024 05:45 AM    HEMATOCRIT 31.1 (L) 07/11/2024 05:45 AM    MCV 97.8 07/11/2024 05:45 AM    MCH 32.1 07/11/2024 05:45 AM    MCHC 32.8 07/11/2024 05:45 AM    MPV 9.0 07/11/2024 05:45 AM    NEUTSPOLYS 64.00 07/11/2024 05:45 AM    LYMPHOCYTES 23.60 07/11/2024 05:45 AM    MONOCYTES 7.10 07/11/2024 05:45 AM    EOSINOPHILS 4.20 07/11/2024 05:45 AM    BASOPHILS 0.50 07/11/2024 05:45 AM      Lab Results   Component Value Date/Time    SODIUM 142 07/11/2024 05:45 AM    POTASSIUM 3.5 (L) 07/11/2024 05:45 AM    CHLORIDE 109 07/11/2024 05:45 AM    CO2 23 07/11/2024 05:45 AM    GLUCOSE 95 07/11/2024 05:45 AM    BUN 4 (L) 07/11/2024 05:45 AM    CREATININE 0.48 (L) 07/11/2024 05:45 AM    BUNCREATRAT 18 05/26/2017 07:09 AM    GLOMRATE 95 10/31/2023 10:46 AM      Lab Results   Component Value Date/Time    CHOLSTRLTOT 194 07/06/2024 04:40 AM    LDL 81 07/06/2024 04:40 AM    HDL 70 07/06/2024 04:40 AM    TRIGLYCERIDE 217 (H) 07/06/2024 04:40 AM       Lab Results   Component Value Date/Time    ALKPHOSPHAT 70 07/05/2024 04:56 AM    ASTSGOT 25 07/05/2024 04:56 AM    ALTSGPT 16 07/05/2024 04:56 AM    TBILIRUBIN 0.3 07/05/2024 04:56 AM        Imaging/Testing:    I interpreted and/or reviewed the patient's neuroimaging    DX-SHOULDER 2+ LEFT   Final Result      1.  Normal shoulder series.      CT-HEAD W/O   Final Result         1. Limited by motion.   2. No definite new abnormality.               DX-CHEST-LIMITED (1 VIEW)   Final Result         New right IJ central venous catheter is in good position without a pneumothorax.      CT-HEAD W/O   Final Result      Diffuse low-attenuation of  the right hemisphere is consistent with a MCA territory infarct. Small areas of increased density within this region may represent petechial hemorrhages. There is effacement of the right lateral ventricle with approximately 3    mm of midline shift.               CT-CTA PELVIS WITH & W/O-POST PROCESS   Final Result      1.  No CTA evidence of active extravasation of contrast to suggest active bleeding.   2.  There is a subcutaneous hematoma in the right groin adjacent to the common femoral vessels.   3.  Contrast in the urinary bladder from the angiographic procedure earlier today.      EC-ECHOCARDIOGRAM COMPLETE W/O CONT   Final Result      MR-BRAIN-W/O   Final Result         Acute infarct in the right frontoparietal region and right insula predominantly involving the cortex. Acute infarct also noted in the right caudate and putamen.      Minimal subarachnoid hemorrhage noted in the sylvian fissure and the sulci over the convexity, likely procedure related.      No midline shift or significant mass effect.      IR-THROMBO MECHANICAL ARTERY,INIT   Final Result         33-year-old who presented with left-sided symptoms was found to have a right MCA M3 occlusion with a perfusion defect identified on CT perfusion imaging. Patient underwent emergent mechanical thrombectomy with multiple attempts to recanalize the parietal    M3 branch being unsuccessful. Final angiographic images demonstrate good antegrade flow in the MCA branches other than the occluded right MCA M3 parietal branch.      Short segment dissection of the right ICA just below the skull base was identified on the final angiogram.  Findings discussed with Dr. Fernandez and given the nonprogressive nature of the dissection without flow limitation, the dissection will be managed by    initiation of antithrombotic/anticoagulation therapy.      Final recanalization score: TICI 2 C      I, Kavita Matson was physically present and participated during the entire  "procedure of the IR-THROMBO MECHANICAL ARTERY,INIT.                  DX-CHEST-PORTABLE (1 VIEW)   Final Result      Mild interstitial prominence could be related to hypoinflation. No consolidation or pleural effusion.      CT-CTA NECK WITH & W/O-POST PROCESSING   Final Result      CT angiogram of the neck within normal limits.      CT-CTA HEAD WITH & W/O-POST PROCESS   Final Result      Occlusion of the superior sylvian branch M2 segment RIGHT middle cerebral artery.      These findings were discussed with GAIL AGUIRRE II on 7/5/2024 5:33 AM.            CT-CEREBRAL PERFUSION ANALYSIS   Final Result      1. Cerebral blood flow less than 30% possibly representing completed infarct = 5 mL. Based on distribution of this finding, this is unlikely to represent artifact.      2. T Max more than 6 seconds possibly representing combination of completed infarct and ischemia = 28 mL. Based on the distribution of this finding, this is unlikely to represent artifact.      3. Mismatched volume possibly representing ischemic brain/penumbra= 23 mL      4.  Please note that this cerebral perfusion study and report is Quantitative and targets supratentorial (cerebral) perfusion for evaluation of large vessel territory acute ischemia/infarction. For example, lacunar infarcts, and brainstem/posterior fossa    ischemia/infarction are not evaluated on this study.  Data acquisition is subject to artifacts which can yield non-anatomically plausible perfusion maps which may be due to motion, bolus timing, signal to noise ratio, or other technical factors.    Perfusion map abnormalities which show non-anatomic distributions are likely artifact.   This study is not \"stand-alone\" and should only be utilized for diagnosis, management/treatment in correlation with CT, CTA, and/or MRI and clinical factors.             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  She is s/p TNK and mechanical thrombectomy, unfortunately with no significant reperfusion.  She has been started on anticoagulation for long-term stroke prevention.  Antiphospholipid antibodies are negative.  She is also s/p hypertonic saline course for mild mass effect seen on CT and MRI.   In addition, she has a R ICA dissection which is being treated with ASA therapy.  TTE without obvious embolic nidus.  I do think stroke etiology most likely related to intrinsic hypercoagulability related to her autoimmunity.  ICU team discussed with patient's rheumatologist- given increased stroke risk with rinvoq, will stop therapy.    Problem list:   R MCA stroke   R ICA dissection   Undefined autoimmune disorder    Plan:   - continue q4h neurochecks/NIHSS, transition to Neuroscience floor when able to be off dex gtt   - BP goal is 110-160/60-80- mild acute hypertensive response as there may be residual ischemic penumbra.  Wean florinef starting 7/20 for long-term goal 110-130/60-80   - continue therapeutic lovenox BID, when able transition to apixaban 5mg BID   - continue ASA therapy for total of three months to prevent thromboembolic events related to R ICA dissection (stop date is 10/5)   - complete embolic workup with ziopatch at discharge   - stroke labs:  HgbA1c 4.9, LDL 81   - may continue home rosuvastatin 5mg daily for goal LDL < 70   - PT/OT/SLP, physiatry consult   - on anticoagulation for DVT chemoppx   - ordered genetic hypercoagulability evaluation (antithrombin III, Factor II mutation, factor V Leiden)   - [facility] follow up   - please reconsult Stroke Neurology with any additional questions or concerns      The evaluation of the patient, and recommended management, was discussed with Dr. pEps, ICU attending. I have performed a physical exam and reviewed and updated ROS and Plan today (7/11/2024).     Preet Fernandez MD  Vascular Neurology

## 2024-07-12 PROBLEM — N30.00 ACUTE CYSTITIS WITHOUT HEMATURIA: Status: ACTIVE | Noted: 2024-07-12

## 2024-07-12 LAB
ANION GAP SERPL CALC-SCNC: 10 MMOL/L (ref 7–16)
APPEARANCE UR: ABNORMAL
BACTERIA #/AREA URNS HPF: ABNORMAL /HPF
BASOPHILS # BLD AUTO: 0.4 % (ref 0–1.8)
BASOPHILS # BLD: 0.04 K/UL (ref 0–0.12)
BILIRUB UR QL STRIP.AUTO: NEGATIVE
BUN SERPL-MCNC: 5 MG/DL (ref 8–22)
CALCIUM SERPL-MCNC: 8.8 MG/DL (ref 8.5–10.5)
CHLORIDE SERPL-SCNC: 107 MMOL/L (ref 96–112)
CO2 SERPL-SCNC: 24 MMOL/L (ref 20–33)
COLOR UR: YELLOW
CREAT SERPL-MCNC: 0.42 MG/DL (ref 0.5–1.4)
EOSINOPHIL # BLD AUTO: 0.37 K/UL (ref 0–0.51)
EOSINOPHIL NFR BLD: 3.9 % (ref 0–6.9)
EPI CELLS #/AREA URNS HPF: NEGATIVE /HPF
ERYTHROCYTE [DISTWIDTH] IN BLOOD BY AUTOMATED COUNT: 48.9 FL (ref 35.9–50)
GFR SERPLBLD CREATININE-BSD FMLA CKD-EPI: 130 ML/MIN/1.73 M 2
GLUCOSE SERPL-MCNC: 102 MG/DL (ref 65–99)
GLUCOSE UR STRIP.AUTO-MCNC: NEGATIVE MG/DL
HCT VFR BLD AUTO: 32.6 % (ref 37–47)
HGB BLD-MCNC: 10.9 G/DL (ref 12–16)
HYALINE CASTS #/AREA URNS LPF: ABNORMAL /LPF
IMM GRANULOCYTES # BLD AUTO: 0.07 K/UL (ref 0–0.11)
IMM GRANULOCYTES NFR BLD AUTO: 0.7 % (ref 0–0.9)
KETONES UR STRIP.AUTO-MCNC: NEGATIVE MG/DL
LEUKOCYTE ESTERASE UR QL STRIP.AUTO: ABNORMAL
LYMPHOCYTES # BLD AUTO: 1.82 K/UL (ref 1–4.8)
LYMPHOCYTES NFR BLD: 19.1 % (ref 22–41)
MAGNESIUM SERPL-MCNC: 2 MG/DL (ref 1.5–2.5)
MCH RBC QN AUTO: 32.4 PG (ref 27–33)
MCHC RBC AUTO-ENTMCNC: 33.4 G/DL (ref 32.2–35.5)
MCV RBC AUTO: 97 FL (ref 81.4–97.8)
MICRO URNS: ABNORMAL
MONOCYTES # BLD AUTO: 0.83 K/UL (ref 0–0.85)
MONOCYTES NFR BLD AUTO: 8.7 % (ref 0–13.4)
NEUTROPHILS # BLD AUTO: 6.38 K/UL (ref 1.82–7.42)
NEUTROPHILS NFR BLD: 67.2 % (ref 44–72)
NITRITE UR QL STRIP.AUTO: POSITIVE
NRBC # BLD AUTO: 0.06 K/UL
NRBC BLD-RTO: 0.6 /100 WBC (ref 0–0.2)
PH UR STRIP.AUTO: 7 [PH] (ref 5–8)
PLATELET # BLD AUTO: 418 K/UL (ref 164–446)
PMV BLD AUTO: 8.9 FL (ref 9–12.9)
POTASSIUM SERPL-SCNC: 3.8 MMOL/L (ref 3.6–5.5)
PROT UR QL STRIP: 100 MG/DL
RBC # BLD AUTO: 3.36 M/UL (ref 4.2–5.4)
RBC # URNS HPF: ABNORMAL /HPF
RBC UR QL AUTO: ABNORMAL
SODIUM SERPL-SCNC: 141 MMOL/L (ref 135–145)
SP GR UR STRIP.AUTO: 1.02
UROBILINOGEN UR STRIP.AUTO-MCNC: 0.2 MG/DL
WBC # BLD AUTO: 9.5 K/UL (ref 4.8–10.8)
WBC #/AREA URNS HPF: ABNORMAL /HPF

## 2024-07-12 PROCEDURE — 700111 HCHG RX REV CODE 636 W/ 250 OVERRIDE (IP): Performed by: INTERNAL MEDICINE

## 2024-07-12 PROCEDURE — A9270 NON-COVERED ITEM OR SERVICE: HCPCS | Performed by: INTERNAL MEDICINE

## 2024-07-12 PROCEDURE — 700102 HCHG RX REV CODE 250 W/ 637 OVERRIDE(OP): Performed by: INTERNAL MEDICINE

## 2024-07-12 PROCEDURE — 97110 THERAPEUTIC EXERCISES: CPT

## 2024-07-12 PROCEDURE — A9270 NON-COVERED ITEM OR SERVICE: HCPCS | Performed by: HOSPITALIST

## 2024-07-12 PROCEDURE — 99232 SBSQ HOSP IP/OBS MODERATE 35: CPT | Performed by: PSYCHIATRY & NEUROLOGY

## 2024-07-12 PROCEDURE — 97535 SELF CARE MNGMENT TRAINING: CPT

## 2024-07-12 PROCEDURE — 87077 CULTURE AEROBIC IDENTIFY: CPT

## 2024-07-12 PROCEDURE — 99232 SBSQ HOSP IP/OBS MODERATE 35: CPT | Performed by: HOSPITALIST

## 2024-07-12 PROCEDURE — 85025 COMPLETE CBC W/AUTO DIFF WBC: CPT

## 2024-07-12 PROCEDURE — 770001 HCHG ROOM/CARE - MED/SURG/GYN PRIV*

## 2024-07-12 PROCEDURE — 87186 SC STD MICRODIL/AGAR DIL: CPT

## 2024-07-12 PROCEDURE — 81001 URINALYSIS AUTO W/SCOPE: CPT

## 2024-07-12 PROCEDURE — 97530 THERAPEUTIC ACTIVITIES: CPT

## 2024-07-12 PROCEDURE — 87086 URINE CULTURE/COLONY COUNT: CPT

## 2024-07-12 PROCEDURE — 97112 NEUROMUSCULAR REEDUCATION: CPT

## 2024-07-12 PROCEDURE — 83735 ASSAY OF MAGNESIUM: CPT

## 2024-07-12 PROCEDURE — 80048 BASIC METABOLIC PNL TOTAL CA: CPT

## 2024-07-12 PROCEDURE — 700102 HCHG RX REV CODE 250 W/ 637 OVERRIDE(OP): Performed by: HOSPITALIST

## 2024-07-12 RX ORDER — FLUCONAZOLE 100 MG/1
150 TABLET ORAL ONCE
Status: COMPLETED | OUTPATIENT
Start: 2024-07-12 | End: 2024-07-12

## 2024-07-12 RX ORDER — PHENAZOPYRIDINE HYDROCHLORIDE 200 MG/1
200 TABLET, FILM COATED ORAL
Status: DISCONTINUED | OUTPATIENT
Start: 2024-07-12 | End: 2024-07-15 | Stop reason: HOSPADM

## 2024-07-12 RX ORDER — SODIUM CHLORIDE 1 G/1
1 TABLET ORAL EVERY 8 HOURS
Status: DISCONTINUED | OUTPATIENT
Start: 2024-07-12 | End: 2024-07-15

## 2024-07-12 RX ORDER — NITROFURANTOIN 25; 75 MG/1; MG/1
100 CAPSULE ORAL 2 TIMES DAILY WITH MEALS
Status: COMPLETED | OUTPATIENT
Start: 2024-07-12 | End: 2024-07-14

## 2024-07-12 RX ORDER — POTASSIUM CHLORIDE 1500 MG/1
40 TABLET, EXTENDED RELEASE ORAL ONCE
Status: COMPLETED | OUTPATIENT
Start: 2024-07-12 | End: 2024-07-12

## 2024-07-12 RX ADMIN — GABAPENTIN 300 MG: 300 CAPSULE ORAL at 05:13

## 2024-07-12 RX ADMIN — POTASSIUM CHLORIDE 40 MEQ: 1500 TABLET, EXTENDED RELEASE ORAL at 08:15

## 2024-07-12 RX ADMIN — ESCITALOPRAM OXALATE 10 MG: 10 TABLET ORAL at 05:13

## 2024-07-12 RX ADMIN — FLUDROCORTISONE ACETATE 0.1 MG: 0.1 TABLET ORAL at 05:14

## 2024-07-12 RX ADMIN — ROSUVASTATIN CALCIUM 5 MG: 5 TABLET, FILM COATED ORAL at 18:08

## 2024-07-12 RX ADMIN — ENOXAPARIN SODIUM 80 MG: 100 INJECTION SUBCUTANEOUS at 05:14

## 2024-07-12 RX ADMIN — BACLOFEN 5 MG: 10 TABLET ORAL at 18:08

## 2024-07-12 RX ADMIN — APIXABAN 5 MG: 5 TABLET, FILM COATED ORAL at 18:08

## 2024-07-12 RX ADMIN — NITROFURANTOIN MONOHYDRATE/MACROCRYSTALS 100 MG: 75; 25 CAPSULE ORAL at 09:40

## 2024-07-12 RX ADMIN — SODIUM CHLORIDE 1 G: 1 TABLET ORAL at 20:10

## 2024-07-12 RX ADMIN — ALPRAZOLAM 0.5 MG: 0.25 TABLET ORAL at 00:54

## 2024-07-12 RX ADMIN — PHENAZOPYRIDINE 200 MG: 200 TABLET ORAL at 18:09

## 2024-07-12 RX ADMIN — BACLOFEN 5 MG: 10 TABLET ORAL at 05:14

## 2024-07-12 RX ADMIN — SODIUM CHLORIDE 1 G: 1 TABLET ORAL at 15:29

## 2024-07-12 RX ADMIN — FOLIC ACID 2 MG: 1 TABLET ORAL at 05:14

## 2024-07-12 RX ADMIN — HYDROXYCHLOROQUINE SULFATE 400 MG: 200 TABLET ORAL at 05:20

## 2024-07-12 RX ADMIN — HYDROCODONE BITARTRATE AND ACETAMINOPHEN 1 TABLET: 5; 325 TABLET ORAL at 01:20

## 2024-07-12 RX ADMIN — FLUCONAZOLE 150 MG: 100 TABLET ORAL at 11:52

## 2024-07-12 RX ADMIN — NITROFURANTOIN MONOHYDRATE/MACROCRYSTALS 100 MG: 75; 25 CAPSULE ORAL at 18:09

## 2024-07-12 RX ADMIN — PHENAZOPYRIDINE 200 MG: 200 TABLET ORAL at 09:40

## 2024-07-12 RX ADMIN — DOCUSATE SODIUM 100 MG: 100 CAPSULE, LIQUID FILLED ORAL at 18:08

## 2024-07-12 RX ADMIN — SODIUM CHLORIDE 3 G: 1 TABLET ORAL at 05:13

## 2024-07-12 RX ADMIN — BACLOFEN 5 MG: 10 TABLET ORAL at 11:52

## 2024-07-12 RX ADMIN — ASPIRIN 81 MG: 81 TABLET, COATED ORAL at 05:14

## 2024-07-12 RX ADMIN — QUETIAPINE FUMARATE 50 MG: 25 TABLET ORAL at 20:10

## 2024-07-12 RX ADMIN — GABAPENTIN 300 MG: 300 CAPSULE ORAL at 18:08

## 2024-07-12 RX ADMIN — PHENAZOPYRIDINE 200 MG: 200 TABLET ORAL at 11:52

## 2024-07-12 ASSESSMENT — PAIN DESCRIPTION - PAIN TYPE
TYPE: ACUTE PAIN

## 2024-07-12 ASSESSMENT — COGNITIVE AND FUNCTIONAL STATUS - GENERAL
SUGGESTED CMS G CODE MODIFIER DAILY ACTIVITY: CL
MOVING TO AND FROM BED TO CHAIR: A LOT
TURNING FROM BACK TO SIDE WHILE IN FLAT BAD: A LOT
DAILY ACTIVITIY SCORE: 8
MOBILITY SCORE: 10
WALKING IN HOSPITAL ROOM: TOTAL
PERSONAL GROOMING: A LOT
EATING MEALS: A LOT
STANDING UP FROM CHAIR USING ARMS: A LOT
TOILETING: TOTAL
TURNING FROM BACK TO SIDE WHILE IN FLAT BAD: A LOT
CLIMB 3 TO 5 STEPS WITH RAILING: TOTAL
MOVING FROM LYING ON BACK TO SITTING ON SIDE OF FLAT BED: A LOT
PERSONAL GROOMING: A LOT
MOBILITY SCORE: 10
SUGGESTED CMS G CODE MODIFIER MOBILITY: CL
DRESSING REGULAR LOWER BODY CLOTHING: TOTAL
HELP NEEDED FOR BATHING: TOTAL
EATING MEALS: A LOT
DRESSING REGULAR LOWER BODY CLOTHING: TOTAL
DRESSING REGULAR UPPER BODY CLOTHING: A LOT
MOVING FROM LYING ON BACK TO SITTING ON SIDE OF FLAT BED: A LOT
WALKING IN HOSPITAL ROOM: TOTAL
STANDING UP FROM CHAIR USING ARMS: A LOT
TOILETING: A LOT
SUGGESTED CMS G CODE MODIFIER DAILY ACTIVITY: CM
DRESSING REGULAR UPPER BODY CLOTHING: TOTAL
SUGGESTED CMS G CODE MODIFIER MOBILITY: CL
DAILY ACTIVITIY SCORE: 10
HELP NEEDED FOR BATHING: TOTAL
MOVING TO AND FROM BED TO CHAIR: A LOT
CLIMB 3 TO 5 STEPS WITH RAILING: TOTAL

## 2024-07-12 ASSESSMENT — ENCOUNTER SYMPTOMS
LOSS OF CONSCIOUSNESS: 0
ABDOMINAL PAIN: 0
DIZZINESS: 0
NAUSEA: 0
CHILLS: 0
COUGH: 0
SHORTNESS OF BREATH: 0
FEVER: 0
HEADACHES: 0
FOCAL WEAKNESS: 1
VOMITING: 0
BACK PAIN: 0
DIARRHEA: 0
PALPITATIONS: 0

## 2024-07-12 ASSESSMENT — GAIT ASSESSMENTS: GAIT LEVEL OF ASSIST: UNABLE TO PARTICIPATE

## 2024-07-12 NOTE — CARE PLAN
"The patient is Stable - Low risk of patient condition declining or worsening    Shift Goals  Clinical Goals: Stable Q4H neuros, mobilize  Patient Goals: \"Pee without pain\"  Family Goals: \"Get to rehab\"    Progress made toward(s) clinical / shift goals:    Problem: Optimal Care of the Stroke Patient  Goal: Optimal emergency care for the stroke patient  Outcome: Progressing     Problem: Knowledge Deficit - Stroke Education  Goal: Patient's knowledge of stroke and risk factors will improve  Outcome: Progressing     Problem: Psychosocial - Patient Condition  Goal: Patient's ability to verbalize feelings about condition will improve  Outcome: Progressing     Problem: Neuro Status  Goal: Neuro status will remain stable or improve  Outcome: Progressing     Problem: Dysphagia  Goal: Dysphagia will improve  Outcome: Progressing     Problem: Urinary Elimination  Goal: Establish and maintain regular urinary output  Outcome: Progressing     Problem: Pain - Standard  Goal: Alleviation of pain or a reduction in pain to the patient’s comfort goal  Outcome: Progressing           "

## 2024-07-12 NOTE — ASSESSMENT & PLAN NOTE
UA Nit/LE pos with bacteria on microscopy  macrobid, pyridium  Urine culture grew E. coli  Frequently gets yeast infections with Abx's: diflucan 100mg po x 1

## 2024-07-12 NOTE — PROGRESS NOTES
Neurosurgery Progress Note    Subjective:  No acute events    Exam:  Awake alert  Speech appropriate  Right  bicep iliopsoas dorsiflexion 5/5  Left upper extremity 0/5, IP dorsiflexion 4 -/5  Sensation present for touch bilateral upper lower extremities    BP  Min: 102/53  Max: 194/79  Pulse  Av.8  Min: 66  Max: 104  Resp  Av.2  Min: 18  Max: 48  Temp  Av.3 °C (97.3 °F)  Min: 35.9 °C (96.6 °F)  Max: 36.8 °C (98.2 °F)  SpO2  Av.5 %  Min: 82 %  Max: 100 %    No data recorded    Recent Labs     24  0503 07/10/24  0010 07/10/24  0558 24  0545   WBC 7.5  --  8.9 8.8   RBC 3.00*  --  3.10* 3.18*   HEMOGLOBIN 9.9* 11.4* 9.9* 10.2*   HEMATOCRIT 29.7* 34.9* 30.4* 31.1*   MCV 99.0*  --  98.1* 97.8   MCH 33.0  --  31.9 32.1   MCHC 33.3  --  32.6 32.8   RDW 50.5*  --  48.6 49.5   PLATELETCT 370  --  420 387   MPV 9.3  --  9.4 9.0     Recent Labs     24  0503 24  0643 07/10/24  0558 07/10/24  1326 07/10/24  1748 07/10/24  2357 24  0545   SODIUM 143   < > 147*   < > 142 144 142   POTASSIUM 3.6  --  4.1  --   --   --  3.5*   CHLORIDE 112  --  117*  --   --   --  109   CO2 20  --  21  --   --   --  23   GLUCOSE 123*  --  114*  --   --   --  95   BUN 2*  --  3*  --   --   --  4*   CREATININE 0.36*  --  0.39*  --   --   --  0.48*   CALCIUM 8.2*  --  8.2*  --   --   --  8.3*    < > = values in this interval not displayed.     Intake/Output                         07/10/24 0700 - 24 0659 24 - 24 Total  Total                 Intake    P.O.  680  130 810  480  -- 480    P.O. 680 130 810 480 -- 480    I.V.  677.3  68 745.2  47.3  -- 47.3    Magnesium Sulfate Volume 0 -- 0 -- -- --    Precedex Volume 195.7 68 263.7 47.3 -- 47.3    Volume (mL) (3% sodium chloride (Hypertonic Saline) 500mL infusion) 481.5 -- 481.5 -- -- --    IV Piggyback  0  0 0  --  -- --    Volume (mL) (ferric gluconate complex (Ferrlecit) 250 mg in   mL IVPB) 0 0 0 -- -- --    Total Intake 1357.3 198 1555.2 527.3 -- 527.3       Output    Urine  1995 1005 3000  985  -- 985    Number of Times Incontinent of Urine -- -- -- 1 x -- 1 x    Output (mL) (External Urinary Catheter (Female Wick)) -- -- -- 100 -- 100    Output (mL) ([REMOVED] Urethral Catheter 07/11/24 1700) 1995 1005 3000 885 -- 885    Stool  --  -- --  --  -- --    Number of Times Stooled -- 1 x 1 x -- -- --    Total Output 1995 1005 3000 985 -- 985       Net I/O     -637.8 -807.1 -1444.8 -457.7 -- -457.7              Intake/Output Summary (Last 24 hours) at 7/11/2024 2039  Last data filed at 7/11/2024 1800  Gross per 24 hour   Intake 708.98 ml   Output 1865 ml   Net -1156.02 ml        QUEtiapine  50 mg Nightly    escitalopram  10 mg DAILY    ALPRAZolam  0.5 mg Q4HRS PRN    baclofen  5 mg TID    sodium chloride  3 g Q8HRS    fludrocortisone  0.1 mg BID    haloperidol lactate  1 mg Q4HRS PRN    dexmedetomidine (Precedex) infusion  0.1-1.5 mcg/kg/hr (Order-Specific) Continuous    simethicone  125 mg TID PRN    hydroxychloroquine  400 mg Once per day on Monday Tuesday Wednesday Thursday Friday    And    [START ON 7/13/2024] hydroxychloroquine  200 mg Once per day on Sunday Saturday    MD Alert...Adult ICU Electrolyte Replacement per Pharmacy   PHARMACY TO DOSE    labetalol  10 mg Q10 MIN PRN    hydrALAZINE  10 mg Q2HRS PRN    acetaminophen  650 mg Q6HRS PRN    ondansetron  4 mg Q4HRS PRN    ondansetron  4 mg Q4HRS PRN    promethazine  12.5-25 mg Q4HRS PRN    promethazine  12.5-25 mg Q4HRS PRN    prochlorperazine  5-10 mg Q4HRS PRN    docusate sodium  100 mg BID    folic acid  2 mg DAILY    gabapentin  300 mg BID    rosuvastatin  5 mg Q EVENING    enoxaparin (LOVENOX) injection  1 mg/kg Q12HRS    aspirin  81 mg DAILY    HYDROcodone-acetaminophen  1 Tablet Q8HRS PRN       Assessment and Plan:  Hospital day # 6 right MCA stroke  POD # N/A  Prophylactic anticoagulation: yes         Start date/time: If  clinically indicated given stroke with inability to obtain recanalization and endovascular lab     Brain Compression: Yes Nontraumatic  Stable exam  Needs anticoagulation for occluded MCA branch, ICA dissection, however this carries risk of intracranial hemorrhage.  Is tolerating this with no obvious hemorrhagic sequela  Neurosurgical intervention unlikely at this time.  Neurosurgery will sign off.  Please call with any questions.  No follow up in my clinic indicated.

## 2024-07-12 NOTE — DISCHARGE PLANNING
Spoke with Irvin at [facility], they currently have no open Medicaid beds. Multiple calls placed to [facility] left message for return call. Spoke with Suyapa at [facility], they will not review patient until out of ICU at least 48 hours.  Spoke Eveline at [facility] have declined due to patients current ICU status.

## 2024-07-12 NOTE — PROGRESS NOTES
Neurology Progress Note  Neurohospitalist Service, [facility]    Referring Physician: Mikey Underwood, *    Chief Complaint   Patient presents with    Sent by MD    Weakness     Transfer from [place] for further evaluation of unilateral weakness.        HPI: Refer to initial documented Neurology H&P, as detailed in the patient's chart.    Interval History 7/12: No new events overnight.    Review of systems: In addition to what is detailed in the HPI and/or updated in the interval history, all other systems reviewed and are negative.    Past Medical History:    has a past medical history of Abdominal pain, left upper quadrant, Anxiety, Bronchitis, Chest pain, Cough, Depression, Early satiety, Gastritis, Helicobacter pylori (H. pylori), Jaw pain, Migraine, Musculoskeletal pain, Otitis media, Right wrist pain, Sinus disorder, Sinusitis, SOB (shortness of breath), Thrombocytosis, Tonsillitis, URI (upper respiratory infection), and Yeast infection.    FHx:  family history includes Cancer in her paternal grandfather; Diabetes in her maternal grandmother, mother, paternal grandfather, and paternal grandmother; Heart Disease in her maternal grandmother; Hypertension in her father.    SHx:   reports that she has never smoked. She has never used smokeless tobacco. She reports that she does not drink alcohol and does not use drugs.    Medications:    Current Facility-Administered Medications:     potassium chloride SA (Kdur) tablet 40 mEq, 40 mEq, Oral, Once, Mikey Underwood D.O.    nitrofurantoin (Macrobid) capsule 100 mg, 100 mg, Oral, BID WITH MEALS, Mikey Underwood D.O.    phenazopyridine (Pyridium) tablet 200 mg, 200 mg, Oral, TID WITH MEALS, Mikey Underwood D.O.    QUEtiapine (SEROquel) tablet 50 mg, 50 mg, Oral, Nightly, Lili Epps M.D., 50 mg at 07/11/24 2132    escitalopram (Lexapro) tablet 10 mg, 10 mg, Oral, DAILY, Lili Epps M.D., 10 mg at  07/12/24 0513    ALPRAZolam (Xanax) tablet 0.5 mg, 0.5 mg, Oral, Q4HRS PRN, Lili Epps M.D., 0.5 mg at 07/12/24 0054    baclofen (Lioresal) tablet 5 mg, 5 mg, Oral, TID, Lili Epps M.D., 5 mg at 07/12/24 0514    sodium chloride (Salt) tablet 3 g, 3 g, Oral, Q8HRS, Lili Epps M.D., 3 g at 07/12/24 0513    fludrocortisone (Florinef) tablet 0.1 mg, 0.1 mg, Oral, BID, Enrrique Welsh M.D., 0.1 mg at 07/12/24 0514    haloperidol lactate (Haldol) injection 1 mg, 1 mg, Intravenous, Q4HRS PRN, Pete Dillard M.D., 1 mg at 07/09/24 2308    dexmedetomidine (Precedex) 400 mcg/100mL infusion, 0.1-1.5 mcg/kg/hr (Order-Specific), Intravenous, Continuous, Pete Dillard M.D., Stopped at 07/11/24 1315    simethicone (Mylicon) chewable tablet 125 mg, 125 mg, Oral, TID PRN, Lili Epps M.D., 125 mg at 07/11/24 1618    hydroxychloroquine (Plaquenil) tablet 400 mg, 400 mg, Oral, Once per day on Monday Tuesday Wednesday Thursday Friday, 400 mg at 07/12/24 0520 **AND** [START ON 7/13/2024] hydroxychloroquine (Plaquenil) tablet 200 mg, 200 mg, Oral, Once per day on Sunday Saturday, Lili Epps M.D.    MD Alert...ICU Electrolyte Replacement per Pharmacy, , Other, PHARMACY TO DOSE, Jeremy M Gonda, M.D.    labetalol (Normodyne/Trandate) injection 10 mg, 10 mg, Intravenous, Q10 MIN PRN, Jeremy M Gonda, M.D., 10 mg at 07/10/24 1913    hydrALAZINE (Apresoline) injection 10 mg, 10 mg, Intravenous, Q2HRS PRN, Jeremy M Gonda, M.D., 10 mg at 07/10/24 1804    acetaminophen (Tylenol) tablet 650 mg, 650 mg, Oral, Q6HRS PRN, Jeremy M Gonda, M.D., 650 mg at 07/09/24 1934    ondansetron (Zofran) syringe/vial injection 4 mg, 4 mg, Intravenous, Q4HRS PRN, Jeremy M Gonda, M.D., 4 mg at 07/07/24 0336    ondansetron (Zofran ODT) dispertab 4 mg, 4 mg, Oral, Q4HRS PRN, Jeremy M Gonda, M.D.    promethazine (Phenergan) tablet 12.5-25 mg, 12.5-25 mg, Oral, Q4HRS PRN, Jeremy M Gonda, M.D.    promethazine (Phenergan)  suppository 12.5-25 mg, 12.5-25 mg, Rectal, Q4HRS PRN, Jeremy M Gonda, M.D.    prochlorperazine (Compazine) injection 5-10 mg, 5-10 mg, Intravenous, Q4HRS PRN, Jeremy M Gonda, M.D., 5 mg at 07/05/24 1322    docusate sodium (Colace) capsule 100 mg, 100 mg, Oral, BID, Jeremy M Gonda, M.D., 100 mg at 07/11/24 1717    folic acid (Folvite) tablet 2 mg, 2 mg, Oral, DAILY, Jeremy M Gonda, M.D., 2 mg at 07/12/24 0514    gabapentin (Neurontin) capsule 300 mg, 300 mg, Oral, BID, Jeremy M Gonda, M.D., 300 mg at 07/12/24 0513    rosuvastatin (Crestor) tablet 5 mg, 5 mg, Oral, Q EVENING, Jeremy M Gonda, M.D., 5 mg at 07/11/24 1717    enoxaparin (Lovenox) inj 80 mg, 1 mg/kg, Subcutaneous, Q12HRS, Jeremy M Gonda, M.D., 80 mg at 07/12/24 0514    aspirin EC tablet 81 mg, 81 mg, Oral, DAILY, Jeremy M Gonda, M.D., 81 mg at 07/12/24 0514    HYDROcodone-acetaminophen (Norco) 5-325 MG per tablet 1 Tablet, 1 Tablet, Oral, Q8HRS PRN, Jeremy M Gonda, M.D., 1 Tablet at 07/12/24 0120    Physical Examination:     Vitals:    07/12/24 0300 07/12/24 0400 07/12/24 0500 07/12/24 0600   BP: 123/68 (!) 142/73 (!) 152/71 138/86   Pulse: 79 68 71 78   Resp: (!) 21 16 (!) 31 (!) 30   Temp:  36.4 °C (97.6 °F)  36.4 °C (97.6 °F)   TempSrc:  Temporal  Temporal   SpO2:  97%  97%   Weight:       Height:             NEUROLOGICAL EXAM:     Awake alert follows commands.  Speech is intact.  left facial droop.  Extraocular muscles intact.  No gaze preference.  Sustained antigravity in the right arm and leg.  Left arm minimal movement in the fingers with great effort.  Briefly antigravity left lower extremity.  Neglect to the left side and double stimulation.    Objective Data:    Labs:  Lab Results   Component Value Date/Time    PROTHROMBTM 13.9 07/05/2024 09:50 AM    INR 1.06 07/05/2024 09:50 AM      Lab Results   Component Value Date/Time    WBC 9.5 07/12/2024 04:05 AM    RBC 3.36 (L) 07/12/2024 04:05 AM    HEMOGLOBIN 10.9 (L) 07/12/2024 04:05 AM    HEMATOCRIT  32.6 (L) 07/12/2024 04:05 AM    MCV 97.0 07/12/2024 04:05 AM    MCH 32.4 07/12/2024 04:05 AM    MCHC 33.4 07/12/2024 04:05 AM    MPV 8.9 (L) 07/12/2024 04:05 AM    NEUTSPOLYS 67.20 07/12/2024 04:05 AM    LYMPHOCYTES 19.10 (L) 07/12/2024 04:05 AM    MONOCYTES 8.70 07/12/2024 04:05 AM    EOSINOPHILS 3.90 07/12/2024 04:05 AM    BASOPHILS 0.40 07/12/2024 04:05 AM      Lab Results   Component Value Date/Time    SODIUM 141 07/12/2024 04:05 AM    POTASSIUM 3.8 07/12/2024 04:05 AM    CHLORIDE 107 07/12/2024 04:05 AM    CO2 24 07/12/2024 04:05 AM    GLUCOSE 102 (H) 07/12/2024 04:05 AM    BUN 5 (L) 07/12/2024 04:05 AM    CREATININE 0.42 (L) 07/12/2024 04:05 AM    BUNCREATRAT 18 05/26/2017 07:09 AM    GLOMRATE 95 10/31/2023 10:46 AM      Lab Results   Component Value Date/Time    CHOLSTRLTOT 194 07/06/2024 04:40 AM    LDL 81 07/06/2024 04:40 AM    HDL 70 07/06/2024 04:40 AM    TRIGLYCERIDE 217 (H) 07/06/2024 04:40 AM       Lab Results   Component Value Date/Time    ALKPHOSPHAT 70 07/05/2024 04:56 AM    ASTSGOT 25 07/05/2024 04:56 AM    ALTSGPT 16 07/05/2024 04:56 AM    TBILIRUBIN 0.3 07/05/2024 04:56 AM        Imaging/Testing:  DX-SHOULDER 2+ LEFT   Final Result      1.  Normal shoulder series.      CT-HEAD W/O   Final Result         1. Limited by motion.   2. No definite new abnormality.               DX-CHEST-LIMITED (1 VIEW)   Final Result         New right IJ central venous catheter is in good position without a pneumothorax.      CT-HEAD W/O   Final Result      Diffuse low-attenuation of the right hemisphere is consistent with a MCA territory infarct. Small areas of increased density within this region may represent petechial hemorrhages. There is effacement of the right lateral ventricle with approximately 3    mm of midline shift.               CT-CTA PELVIS WITH & W/O-POST PROCESS   Final Result      1.  No CTA evidence of active extravasation of contrast to suggest active bleeding.   2.  There is a subcutaneous  hematoma in the right groin adjacent to the common femoral vessels.   3.  Contrast in the urinary bladder from the angiographic procedure earlier today.      EC-ECHOCARDIOGRAM COMPLETE W/O CONT   Final Result      MR-BRAIN-W/O   Final Result         Acute infarct in the right frontoparietal region and right insula predominantly involving the cortex. Acute infarct also noted in the right caudate and putamen.      Minimal subarachnoid hemorrhage noted in the sylvian fissure and the sulci over the convexity, likely procedure related.      No midline shift or significant mass effect.      IR-THROMBO MECHANICAL ARTERY,INIT   Final Result         33-year-old who presented with left-sided symptoms was found to have a right MCA M3 occlusion with a perfusion defect identified on CT perfusion imaging. Patient underwent emergent mechanical thrombectomy with multiple attempts to recanalize the parietal    M3 branch being unsuccessful. Final angiographic images demonstrate good antegrade flow in the MCA branches other than the occluded right MCA M3 parietal branch.      Short segment dissection of the right ICA just below the skull base was identified on the final angiogram.  Findings discussed with Dr. Fernandez and given the nonprogressive nature of the dissection without flow limitation, the dissection will be managed by    initiation of antithrombotic/anticoagulation therapy.      Final recanalization score: TICI 2 C      I, Kavita Matson was physically present and participated during the entire procedure of the IR-THROMBO MECHANICAL ARTERY,INIT.                  DX-CHEST-PORTABLE (1 VIEW)   Final Result      Mild interstitial prominence could be related to hypoinflation. No consolidation or pleural effusion.      CT-CTA NECK WITH & W/O-POST PROCESSING   Final Result      CT angiogram of the neck within normal limits.      CT-CTA HEAD WITH & W/O-POST PROCESS   Final Result      Occlusion of the superior sylvian branch M2  "segment RIGHT middle cerebral artery.      These findings were discussed with GAIL AGUIRRE II on 7/5/2024 5:33 AM.            CT-CEREBRAL PERFUSION ANALYSIS   Final Result      1. Cerebral blood flow less than 30% possibly representing completed infarct = 5 mL. Based on distribution of this finding, this is unlikely to represent artifact.      2. T Max more than 6 seconds possibly representing combination of completed infarct and ischemia = 28 mL. Based on the distribution of this finding, this is unlikely to represent artifact.      3. Mismatched volume possibly representing ischemic brain/penumbra= 23 mL      4.  Please note that this cerebral perfusion study and report is Quantitative and targets supratentorial (cerebral) perfusion for evaluation of large vessel territory acute ischemia/infarction. For example, lacunar infarcts, and brainstem/posterior fossa    ischemia/infarction are not evaluated on this study.  Data acquisition is subject to artifacts which can yield non-anatomically plausible perfusion maps which may be due to motion, bolus timing, signal to noise ratio, or other technical factors.    Perfusion map abnormalities which show non-anatomic distributions are likely artifact.   This study is not \"stand-alone\" and should only be utilized for diagnosis, management/treatment in correlation with CT, CTA, and/or MRI and clinical factors.               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  Status post TNK and mechanical thrombectomy.  TICI score of 0.  Complicated by a right carotid dissection being treated with anticoagulation.  Etiology is most likely related to her underlying known autoimmune disorder.  Recommend to continue hyper anticoagulation.    Plan:  May transition to oral anticoagulant per primary team's discretion.  Eliquis is okay from our perspective.  Continue aspirin therapy for 3 months.  Zio patch at " discharge  Follow-up in stroke clinic   Follow-up on hypercoagulable labs    Neurology will sign off.      This chart was partially generated using voice recognition technology and sound alike word replacement may be present, best efforts were made to make the chart accurate.    Preet Jacob MD  Board Certified Neurology, ABPN  t) [phone]

## 2024-07-12 NOTE — THERAPY
Physical Therapy   Daily Treatment     Patient Name: Ellie Sánchez  Age:  33 y.o., Sex:  female  Medical Record #: [id number]  Today's Date: 7/12/2024     Precautions  Precautions: Fall Risk;Swallow Precautions;Other (See Comments)  Comments: L side deficits    Assessment    Patient seen for PT treatment session. Patient in bed, agreeable for the session. Able to participate with bed mobility, EOB sitting, sit-stand reps, chair transfers, LLE ex's as detailed below. Will continue to benefit from PT services and recommend post-acute placement at this time.     Plan    Treatment Plan Status: Continue Current Treatment Plan  Type of Treatment: Bed Mobility, Equipment, Family / Caregiver Training, Neuro Re-Education / Balance, Therapeutic Activities, Therapeutic Exercise  Treatment Frequency: Daily  Treatment Duration: Until Therapy Goals Met    DC Equipment Recommendations: Unable to determine at this time  Discharge Recommendations: Recommend post-acute placement for additional physical therapy services prior to discharge home    Objective     07/12/24 1304   Precautions   Precautions Fall Risk;Swallow Precautions;Other (See Comments)   Comments L side deficits   Vitals   O2 Delivery Device None - Room Air   Pain   Pain Scales 0 to 10 Scale    Intervention Medication (see MAR);Repositioned;Rest;Warm Pack;Other (Comments)   Pain 0 - 10 Group   Location Shoulder   Location Orientation Left   Therapist Pain Assessment Prior to Activity;During Activity  (OT was able to address L shoulder pain & stiffness during the session)   Cognition    Cognition / Consciousness X   Level of Consciousness Alert   Ability To Follow Commands 2 Step   Safety Awareness Impaired   New Learning Impaired   Attention Impaired   Sequencing Impaired   Active ROM Lower Body    Active ROM Lower Body  X   Comments LLE-Supine position-Patient was able to perform partial ROM SLR,  partial hip abduction/ adduction, initiate hip-knee flexion    Lower Body Muscle Tone   Lower Body Muscle Tone  X   Lt Lower Extremity Muscle Tone Hypertonic   Supine Lower Body Exercise   Supine Lower Body Exercises Yes   Hip Abduction 1 set of 10;Left   Hip Adduction  1 set of 10;Left   Straight Leg Raises 1 set of 10;Left   Heel Slide 1 set of 10;Left   Ankle Pumps 1 set of 10;Left   Comments AAROM as needed   Neuro-Muscular Treatments   Neuro-Muscular Treatments Postural Facilitation;Postural Changes;Tactile Cuing;Verbal Cuing   Comments Seated EOB; Standing within Maulik Steady with BUE on the bar in-front   Vision   Vision Comments Continues to have L side inattention. Performed L-R-L head turns, visual scanning while seated EOB.   Other Treatments   Other Treatments Provided Performed joint approximation with hip-knee flexed, LLE stretching with hip-knee in extension, calf muscle stretch. Assisted with don of PRAFO towards end of session. Upright standing tolerance, standing balance within maulik steady while performing self care task with OT.   Balance   Sitting Balance (Static) Poor +  (EOB)   Sitting Balance (Dynamic) Poor   Standing Balance (Static) Poor +  (Within Maulik Steady)   Standing Balance (Dynamic) Trace +   Weight Shift Sitting Fair   Weight Shift Standing Poor   Skilled Intervention Verbal Cuing;Sequencing;Postural Facilitation;Compensatory Strategies   Comments Seated EOB: Cues for compensatory strategies to maintain appropriate balance, posture and center of mass; appropriate placement of RUE to assist with balance; Standing within Maulik Steady: Cues for weight shifts to maintain appropriate standing balance; continues to have L side lean in standing. Assisted with appropriate placement of LLE in standing.   Bed Mobility    Supine to Sit Maximal Assist   Scooting Minimal Assist  (Towards EOB)   Rolling Minimum Assist to Lt.   Skilled Intervention Verbal Cuing;Sequencing;Facilitation;Compensatory Strategies   Comments HOB flat, without use of rails; Able to  initiate bringing LLE OOB.   Gait Analysis   Gait Level Of Assist Unable to Participate   Functional Mobility   Sit to Stand Moderate Assist  (with PT support anteriorly)   Bed, Chair, Wheelchair Transfer Moderate Assist  (Towards R side)   Transfer Method Squat Pivot   Mobility Bed-EOB-sitting posture/balance/tolerance/visual scanning-sit-stand within Adriana Steady-standing tolerance-EOB-sit-stand within Adriana Steady-standing tolerance/balance/[id number]-hcnec pivot to recliner-squat pivot to chair-squat pivot to recliner   Skilled Intervention Verbal Cuing;Sequencing;Postural Facilitation;Compensatory Strategies   Comments x2 person assist for safety; Cues for hand placement, LE placement, sequencing of task. Continued to use Adriana Steady for OOB transfer due to safety concerns from height of the bed   6 Clicks Assessment - How much HELP from from another person do you currently need... (If the patient hasn't done an activity recently, how much help from another person do you think he/she would need if he/she tried?)   Turning from your back to your side while in a flat bed without using bedrails? 2   Moving from lying on your back to sitting on the side of a flat bed without using bedrails? 2   Moving to and from a bed to a chair (including a wheelchair)? 2   Standing up from a chair using your arms (e.g., wheelchair, or bedside chair)? 2   Walking in hospital room? 1   Climbing 3-5 steps with a railing? 1   6 clicks Mobility Score 10   Activity Tolerance   Sitting in Chair Post session   Patient / Family Goals    Patient / Family Goal #1 To get stronger and return home   Goal #1 Outcome Progressing slower than expected   Short Term Goals    Short Term Goal # 1 Patient will perform supine-sit, sit-supine with HOB raised with use of bed rail with CGA in 6 visits to progress with bed mobility   Goal Outcome # 1 Progressing slower than expected   Short Term Goal # 2 Patient will perform sit-stand with LRAD with CGA  in 6 visits to progress with functional mobility   Goal Outcome # 2 Progressing slower than expected   Short Term Goal # 3 Patient will perform chair transfers with LRAD with Min A in 6 visits to progress with OOB to chair for meals   Goal Outcome # 3 Progressing slower than expected   Short Term Goal # 4 Patient will sit EOB with Fair (-) balance, with CGA, for about 20 minutes to improve upright sitting and sitting balance.   Goal Outcome # 4 Progressing slower than expected   Physical Therapy Treatment Plan   Physical Therapy Treatment Plan Continue Current Treatment Plan   Anticipated Discharge Equipment and Recommendations   DC Equipment Recommendations Unable to determine at this time   Discharge Recommendations Recommend post-acute placement for additional physical therapy services prior to discharge home   Interdisciplinary Plan of Care Collaboration   IDT Collaboration with  Nursing;Occupational Therapist   Patient Position at End of Therapy Seated;Chair Alarm On;Call Light within Reach;Tray Table within Reach;Family / Friend in Room  (Aunt at bedside)   Session Information   Date / Session Number  7/12-6(6/5, 7/14)     Patient seen for team treatment with Occupational Therapist for the following reason(s):  Patient required 2 person assistance for safety and to provide effective interventions. Each discipline assisted patient with appropriate and separate goals.

## 2024-07-12 NOTE — PROGRESS NOTES
Hospital Medicine Daily Progress Note    Date of Service  7/12/2024    Chief Complaint  Ellie Sánchez is a 33 y.o. female admitted 7/5/2024 with L side weakness    Hospital Course  33 y.o. female who presented 7/5/2024 with a history of thrombocytosis, migraine, lupus, and rheumatoid arthritis.  She had been maintained on Plaquenil, and methotrexate for some time however the methotrexate was stopped, and the patient was started on Rinvoq recently.  Patient presented on July 5 after developing left-sided weakness suddenly which lasted about 30 minutes.  It resolved by the time she arrived to the emergency room.  She had CT imaging there which was interpreted as unremarkable.  And was sent to us.  Here she had a recurrence of her symptoms.  Imaging was repeated, and she was found to have evidence of a right M2 thrombus.  Neurology was consulted, and the patient was given IV TNK and taken emergently to the IR suite.  There she underwent mechanical thrombectomy with TICI 2C flow postprocedure.     Interval Problem Update  Pt notes some pain when she pees; feels like a UTI.  L leg is a little stronger today    Sinus 70-80s  -140  On RA  AFebrile    I have discussed this patient's plan of care and discharge plan at IDT rounds today with Case Management, Nursing, Nursing leadership, and other members of the IDT team.    Consultants/Specialty  neurology    Code Status  Full Code    Disposition  The patient is not medically cleared for discharge to home or a post-acute facility.      I have placed the appropriate orders for post-discharge needs.    Review of Systems  Review of Systems   Constitutional:  Negative for chills and fever.   Respiratory:  Negative for cough and shortness of breath.    Cardiovascular:  Negative for chest pain, palpitations and leg swelling.   Gastrointestinal:  Negative for abdominal pain, diarrhea, nausea and vomiting.   Genitourinary:  Positive for dysuria, frequency and urgency.    Musculoskeletal:  Negative for back pain.   Skin:  Negative for rash.   Neurological:  Positive for focal weakness. Negative for dizziness, loss of consciousness and headaches.        Physical Exam  Temp:  [35.9 °C (96.7 °F)-36.8 °C (98.2 °F)] 36.4 °C (97.6 °F)  Pulse:  [] 71  Resp:  [16-89] 31  BP: (102-194)/(53-87) 152/71  SpO2:  [90 %-100 %] 97 %    Physical Exam  Constitutional:       General: She is not in acute distress.     Appearance: Normal appearance. She is well-developed. She is not diaphoretic.   HENT:      Head: Normocephalic and atraumatic.   Neck:      Vascular: No JVD.   Cardiovascular:      Rate and Rhythm: Normal rate and regular rhythm.      Heart sounds: No murmur heard.  Pulmonary:      Effort: Pulmonary effort is normal. No respiratory distress.      Breath sounds: No stridor. No wheezing or rales.   Abdominal:      Palpations: Abdomen is soft.      Tenderness: There is no abdominal tenderness. There is no guarding or rebound.   Skin:     General: Skin is warm and dry.      Findings: No rash.   Neurological:      Mental Status: She is oriented to person, place, and time.      Comments: Alert interactive  Speech clear  Face symmetric  Tongue midline  EOMI  Attends to the L spontanesouly  L arm flacid  L leg 2/5   Psychiatric:         Mood and Affect: Mood normal.         Behavior: Behavior normal.         Thought Content: Thought content normal.         Fluids    Intake/Output Summary (Last 24 hours) at 7/12/2024 0637  Last data filed at 7/12/2024 0600  Gross per 24 hour   Intake 967.34 ml   Output 1585 ml   Net -617.66 ml       Laboratory  Recent Labs     07/10/24  0558 07/11/24  0545 07/12/24  0405   WBC 8.9 8.8 9.5   RBC 3.10* 3.18* 3.36*   HEMOGLOBIN 9.9* 10.2* 10.9*   HEMATOCRIT 30.4* 31.1* 32.6*   MCV 98.1* 97.8 97.0   MCH 31.9 32.1 32.4   MCHC 32.6 32.8 33.4   RDW 48.6 49.5 48.9   PLATELETCT 420 387 418   MPV 9.4 9.0 8.9*     Recent Labs     07/10/24  0558 07/10/24  1329  07/10/24  2357 07/11/24  0545 07/12/24  0405   SODIUM 147*   < > 144 142 141   POTASSIUM 4.1  --   --  3.5* 3.8   CHLORIDE 117*  --   --  109 107   CO2 21  --   --  23 24   GLUCOSE 114*  --   --  95 102*   BUN 3*  --   --  4* 5*   CREATININE 0.39*  --   --  0.48* 0.42*   CALCIUM 8.2*  --   --  8.3* 8.8    < > = values in this interval not displayed.                   Imaging  DX-SHOULDER 2+ LEFT   Final Result      1.  Normal shoulder series.      CT-HEAD W/O   Final Result         1. Limited by motion.   2. No definite new abnormality.               DX-CHEST-LIMITED (1 VIEW)   Final Result         New right IJ central venous catheter is in good position without a pneumothorax.      CT-HEAD W/O   Final Result      Diffuse low-attenuation of the right hemisphere is consistent with a MCA territory infarct. Small areas of increased density within this region may represent petechial hemorrhages. There is effacement of the right lateral ventricle with approximately 3    mm of midline shift.               CT-CTA PELVIS WITH & W/O-POST PROCESS   Final Result      1.  No CTA evidence of active extravasation of contrast to suggest active bleeding.   2.  There is a subcutaneous hematoma in the right groin adjacent to the common femoral vessels.   3.  Contrast in the urinary bladder from the angiographic procedure earlier today.      EC-ECHOCARDIOGRAM COMPLETE W/O CONT   Final Result      MR-BRAIN-W/O   Final Result         Acute infarct in the right frontoparietal region and right insula predominantly involving the cortex. Acute infarct also noted in the right caudate and putamen.      Minimal subarachnoid hemorrhage noted in the sylvian fissure and the sulci over the convexity, likely procedure related.      No midline shift or significant mass effect.      IR-THROMBO MECHANICAL ARTERY,INIT   Final Result         33-year-old who presented with left-sided symptoms was found to have a right MCA M3 occlusion with a perfusion  defect identified on CT perfusion imaging. Patient underwent emergent mechanical thrombectomy with multiple attempts to recanalize the parietal    M3 branch being unsuccessful. Final angiographic images demonstrate good antegrade flow in the MCA branches other than the occluded right MCA M3 parietal branch.      Short segment dissection of the right ICA just below the skull base was identified on the final angiogram.  Findings discussed with Dr. Fernandez and given the nonprogressive nature of the dissection without flow limitation, the dissection will be managed by    initiation of antithrombotic/anticoagulation therapy.      Final recanalization score: TICI 2 C      I, Kavita Matson was physically present and participated during the entire procedure of the IR-THROMBO MECHANICAL ARTERY,INIT.                  DX-CHEST-PORTABLE (1 VIEW)   Final Result      Mild interstitial prominence could be related to hypoinflation. No consolidation or pleural effusion.      CT-CTA NECK WITH & W/O-POST PROCESSING   Final Result      CT angiogram of the neck within normal limits.      CT-CTA HEAD WITH & W/O-POST PROCESS   Final Result      Occlusion of the superior sylvian branch M2 segment RIGHT middle cerebral artery.      These findings were discussed with GAIL AGUIRRE II on 7/5/2024 5:33 AM.            CT-CEREBRAL PERFUSION ANALYSIS   Final Result      1. Cerebral blood flow less than 30% possibly representing completed infarct = 5 mL. Based on distribution of this finding, this is unlikely to represent artifact.      2. T Max more than 6 seconds possibly representing combination of completed infarct and ischemia = 28 mL. Based on the distribution of this finding, this is unlikely to represent artifact.      3. Mismatched volume possibly representing ischemic brain/penumbra= 23 mL      4.  Please note that this cerebral perfusion study and report is Quantitative and targets supratentorial (cerebral) perfusion for  "evaluation of large vessel territory acute ischemia/infarction. For example, lacunar infarcts, and brainstem/posterior fossa    ischemia/infarction are not evaluated on this study.  Data acquisition is subject to artifacts which can yield non-anatomically plausible perfusion maps which may be due to motion, bolus timing, signal to noise ratio, or other technical factors.    Perfusion map abnormalities which show non-anatomic distributions are likely artifact.   This study is not \"stand-alone\" and should only be utilized for diagnosis, management/treatment in correlation with CT, CTA, and/or MRI and clinical factors.              Assessment/Plan  * Acute ischemic right MCA stroke (HCC)- (present on admission)  Assessment & Plan  R M2  S/p TNK and mechanical thrombectomy  Possibly related to underlying hypercoagulable state (Hx SLE/RA) vs MAB vs cardio-embolic  MRI confirms R MCA infarct, no other findings  EKG and Tele have been sinus  TTE benign  Neuro and Physiatry following  PT/OT/SLP involved  LDL 88  A1c<6  Statin  On enoxaparin plan transition to apixaban 5mg BID  ASA for 3 months for ICA disection  Plan rehab DC  DC on Zio patch    Acute cystitis without hematuria  Assessment & Plan  UA Nit/LE pos with bacteria on microscopy  Start macrobid, pyridium  Urine sent for culture  Frequently gets yeast infections with Abx's: diflucan 100mg po x 1    Anemia  Assessment & Plan  Reporducitve age female  Monitor  Follow up as outpt    Hyperglycemia  Assessment & Plan  A1c<5: stress response  monitor    Hypokalemia  Assessment & Plan  Follow and replace as needed  Follow and replace Mg    Hypomagnesemia  Assessment & Plan  1.8 today: giving 2g IV MgSO4  Repeat lab in am      SLE (systemic lupus erythematosus related syndrome) (HCC)- (present on admission)  Assessment & Plan  Had been on Plaquenil and methotrexate for some time and then was transition to Plaquenil and Rinvoq.  Given her recent thromboembolic event, we " have stopped her invoke.  Continue on Plaquenil  Follow-up with her outpatient rheumatology team    Thrombocytosis- (present on admission)  Assessment & Plan  Patient has chronic thrombocytosis with platelet count runs in the 500s which is where she is now.    Mixed dyslipidemia- (present on admission)  Assessment & Plan  statin    Anxiety- (present on admission)  Assessment & Plan  Prn support         VTE prophylaxis: VTE Selection    I have performed a physical exam and reviewed and updated ROS and Plan today (7/12/2024). In review of yesterday's note (7/11/2024), there are no changes except as documented above.

## 2024-07-12 NOTE — PROGRESS NOTES
Neurosurgery Progress Note    Subjective:  No acute events    Exam:  Awake alert  Speech appropriate  Right  bicep iliopsoas dorsiflexion 5/5  Left upper extremity 0/5, IP dorsiflexion 4 -/5  Sensation present for touch bilateral upper lower extremities    BP  Min: 102/53  Max: 194/79  Pulse  Av.8  Min: 66  Max: 104  Resp  Av.2  Min: 18  Max: 48  Temp  Av.3 °C (97.3 °F)  Min: 35.9 °C (96.6 °F)  Max: 36.8 °C (98.2 °F)  SpO2  Av.5 %  Min: 82 %  Max: 100 %    No data recorded    Recent Labs     24  0503 07/10/24  0010 07/10/24  0558 24  0545   WBC 7.5  --  8.9 8.8   RBC 3.00*  --  3.10* 3.18*   HEMOGLOBIN 9.9* 11.4* 9.9* 10.2*   HEMATOCRIT 29.7* 34.9* 30.4* 31.1*   MCV 99.0*  --  98.1* 97.8   MCH 33.0  --  31.9 32.1   MCHC 33.3  --  32.6 32.8   RDW 50.5*  --  48.6 49.5   PLATELETCT 370  --  420 387   MPV 9.3  --  9.4 9.0     Recent Labs     24  0503 24  0643 07/10/24  0558 07/10/24  1326 07/10/24  1748 07/10/24  2357 24  0545   SODIUM 143   < > 147*   < > 142 144 142   POTASSIUM 3.6  --  4.1  --   --   --  3.5*   CHLORIDE 112  --  117*  --   --   --  109   CO2 20  --  21  --   --   --  23   GLUCOSE 123*  --  114*  --   --   --  95   BUN 2*  --  3*  --   --   --  4*   CREATININE 0.36*  --  0.39*  --   --   --  0.48*   CALCIUM 8.2*  --  8.2*  --   --   --  8.3*    < > = values in this interval not displayed.                 Intake/Output                         07/10/24 0700 - 2459 24 - 24 Total  Total                 Intake    P.O.  680  130 810  480  -- 480    P.O. 680 130 810 480 -- 480    I.V.  677.3  68 745.2  47.3  -- 47.3    Magnesium Sulfate Volume 0 -- 0 -- -- --    Precedex Volume 195.7 68 263.7 47.3 -- 47.3    Volume (mL) (3% sodium chloride (Hypertonic Saline) 500mL infusion) 481.5 -- 481.5 -- -- --    IV Piggyback  0  0 0  --  -- --    Volume (mL) (ferric gluconate complex  (Ferrlecit) 250 mg in  mL IVPB) 0 0 0 -- -- --    Total Intake 1357.3 198 1555.2 527.3 -- 527.3       Output    Urine  1995  1005 3000  985  -- 985    Number of Times Incontinent of Urine -- -- -- 1 x -- 1 x    Output (mL) (External Urinary Catheter (Female Wick)) -- -- -- 100 -- 100    Output (mL) ([REMOVED] Urethral Catheter 07/11/24 1700) 1995 1005 3000 885 -- 885    Stool  --  -- --  --  -- --    Number of Times Stooled -- 1 x 1 x -- -- --    Total Output 1995 1005 3000 985 -- 985       Net I/O     -637.8 -807.1 -1444.8 -457.7 -- -457.7              Intake/Output Summary (Last 24 hours) at 7/11/2024 2038  Last data filed at 7/11/2024 1800  Gross per 24 hour   Intake 708.98 ml   Output 1865 ml   Net -1156.02 ml        QUEtiapine  50 mg Nightly    escitalopram  10 mg DAILY    ALPRAZolam  0.5 mg Q4HRS PRN    baclofen  5 mg TID    sodium chloride  3 g Q8HRS    fludrocortisone  0.1 mg BID    haloperidol lactate  1 mg Q4HRS PRN    dexmedetomidine (Precedex) infusion  0.1-1.5 mcg/kg/hr (Order-Specific) Continuous    simethicone  125 mg TID PRN    hydroxychloroquine  400 mg Once per day on Monday Tuesday Wednesday Thursday Friday    And    [START ON 7/13/2024] hydroxychloroquine  200 mg Once per day on Sunday Saturday    MD Alert...Adult ICU Electrolyte Replacement per Pharmacy   PHARMACY TO DOSE    labetalol  10 mg Q10 MIN PRN    hydrALAZINE  10 mg Q2HRS PRN    acetaminophen  650 mg Q6HRS PRN    ondansetron  4 mg Q4HRS PRN    ondansetron  4 mg Q4HRS PRN    promethazine  12.5-25 mg Q4HRS PRN    promethazine  12.5-25 mg Q4HRS PRN    prochlorperazine  5-10 mg Q4HRS PRN    docusate sodium  100 mg BID    folic acid  2 mg DAILY    gabapentin  300 mg BID    rosuvastatin  5 mg Q EVENING    enoxaparin (LOVENOX) injection  1 mg/kg Q12HRS    aspirin  81 mg DAILY    HYDROcodone-acetaminophen  1 Tablet Q8HRS PRN       Assessment and Plan:  Hospital day # 5 right MCA stroke  POD # N/A  Prophylactic anticoagulation: yes          Start date/time: If clinically indicated given stroke with inability to obtain recanalization and endovascular lab     Brain Compression: Yes Nontraumatic  Stable exam  Needs anticoagulation for occluded MCA branch, ICA dissection, however this carries risk of intracranial hemorrhage.  Normalize Na  Keep well hydrated

## 2024-07-12 NOTE — THERAPY
Occupational Therapy  Daily Treatment     Patient Name: Ellie Sánchez  Age:  33 y.o., Sex:  female  Medical Record #: [id number]  Today's Date: 7/12/2024     Precautions  Precautions: Fall Risk, Swallow Precautions  Comments: L side deficits, L shoulder pain    Assessment    Pt seen for OT treatment. Pt required max A for bed mobility, mod A for ADL transfer (with lift and squat pivot with L UE support), mod A for assisted standing g/h at the sink using maulik stedy/posterior postural support provided by PT, and total A to don socks. Heat pack applied to L shoulder prior to EOB in preparation for scapular mobilization. PROM performed for wrist flexion/extension, pronation/supination, and elbow flexion/extension. Shoulder/scapular elevation mobilization provided by therapist to L shoulder girdle. Pt had poor tolerance for mobilization initially but reported decreased pain with heat application during mobilization in addition to decreased pain at end of session after ROM/mobilization performed. Pt able to visually scan to the L given mod cues. Education to pt/aunt who was present for session regarding the role of OT, joint protection/safe positioning of L UE, ROM, edema management, and positioning of items/family to promote visual scanning to the L side. Pt current functional performance limited by impaired activity tolerance, pain, impaired balance, generalized weakness, impaired cognition, impaired vision, and L sided deficits. Pt will continue to benefit from skilled OT while admitted to acute care.     Plan    Treatment Plan Status: Continue Current Treatment Plan  Type of Treatment: Self Care / Activities of Daily Living, Adaptive Equipment, Cognitive Skill Development, Neuro Re-Education / Balance, Therapeutic Exercises, Therapeutic Activity, Sensory Integration Techniques  Treatment Frequency: 4 Times per Week  Treatment Duration: Until Therapy Goals Met    DC Equipment Recommendations: Unable to determine  at this time  Discharge Recommendations: Recommend post-acute placement for additional occupational therapy services prior to discharge home     Objective     07/12/24 1313   Precautions   Precautions Fall Risk;Swallow Precautions   Comments L side deficits, L shoulder pain   Pain   Pain Scales 0 to 10 Scale    Pain 0 - 10 Group   Therapist Pain Assessment Nurse Notified;During Activity;Post Activity Pain Same as Prior to Activity;Post Activity  (L shoulder pain, not rated, very sensitive, agreeable to session)   Cognition    Cognition / Consciousness X   Level of Consciousness Alert   Ability To Follow Commands 2 Step   Safety Awareness Impaired   New Learning Impaired   Attention Impaired   Sequencing Impaired   Comments Pleasant and cooperative, receptive to education   Sensation Upper Body   Comments Able to detect pain sensation to L hand   Upper Body Muscle Tone   Upper Body Muscle Tone  X   Lt Upper Extremity Muscle Tone Fluctuating   Supine Upper Body Exercises   Comments PROM for wrist flexion/extension, supination/pronation, and elbow flexion/extension  (Education to aunt who was present for session regarding PROM to wrist, digits, and elbow)   Sitting Upper Body Exercises   Comments Scapular mobilizations, shoulder/scapular elevation only due to high pain in shoulder, education regarding reasoning for mobilizations/pain in shoulder, heat pack provided prior to and during mobilization which pt reported helped pain, pt reported improved pain at end of session in L shoulder after mobilization/ROM performed   Neuro-Muscular Treatments   Neuro-Muscular Treatments Anterior weight shift;Postural Changes;Postural Facilitation;Sequencing;Verbal Cuing   Other Treatments   Other Treatments Provided Visual scanning to the L side while seated. Education to pt/aunt who was present for session regarding joint protection, ROM, edema management, and positioning to promote visual scanning to the L side.   Balance   Sitting  Balance (Static) Poor +   Sitting Balance (Dynamic) Trace +   Standing Balance (Static) Poor +   Standing Balance (Dynamic) Trace +   Weight Shift Sitting Fair   Weight Shift Standing Poor   Skilled Intervention Verbal Cuing;Tactile Cuing;Facilitation;Sequencing;Postural Facilitation   Comments w/ maulik stedy and HHA x2 for safety, support provided to L UE throughout for joint protection/pain reduction   Bed Mobility    Supine to Sit Maximal Assist   Sit to Supine   (up to recliner post)   Scooting Minimal Assist   Skilled Intervention Verbal Cuing;Tactile Cuing;Sequencing;Facilitation   Comments HOB slightly elevated, cues for sequencing   Activities of Daily Living   Grooming Moderate Assist;Standing  (use of maulik stedy at sink with encouragement to stand fully with facilitation from physical therapist, washed face and washed hands, [place] initially required for pt to locate L hand with R hand)   Lower Body Dressing Total Assist  (socks)   Toileting Maximal Assist  (urine)   Skilled Intervention Verbal Cuing;Tactile Cuing;Sequencing;Postural Facilitation;Facilitation;Compensatory Strategies   Functional Mobility   Sit to Stand Minimal Assist   Bed, Chair, Wheelchair Transfer Moderate Assist  (min-mod A)   Transfer Method Squat Pivot;Steady Pivot Lift  (maulik stedy)   Mobility EOB>stand w/ maulik stedy>sink for g/h>chair>recliner>chair>recliner   Skilled Intervention Verbal Cuing;Tactile Cuing;Sequencing;Postural Facilitation;Facilitation   Comments support provided to L UE throughout to prevent pain/joint protection   ICU Target Mobility Level   ICU Mobility - Targeted Level Level 3B   Visual Perception   Visual Perception  X   Neglect Mild Left   Comments Able to look to the L given cues, does not spontaneously scan to the L from observation   Activity Tolerance   Sitting in Chair up to recliner post   Sitting Edge of Bed >10 min   Standing >5 min total (assisted stand w/ maulik stedy and posterior support from physical  therapist   Comments limited by weakness, pain, fatigue   Patient / Family Goals   Patient / Family Goal #1 to get better   Goal #1 Outcome Progressing as expected   Short Term Goals   Short Term Goal # 1 Pt will protect LUE during bed mobility without vc   Goal Outcome # 1 Progressing slower than expected   Short Term Goal # 2 Pt will attend to L visual field during seated ADL tasks for at least 5 min   Goal Outcome # 2 Progressing as expected   Short Term Goal # 3 Pt will demo adonay techniques during seated G/H tasks with min A and postural support   Goal Outcome # 3 Progressing as expected   Short Term Goal # 4 Pt will complete ADL txf mod A   Goal Outcome # 4 Progressing as expected   Education Group   Education Provided Role of Occupational Therapist;Activities of Daily Living;Upper Extremity Range of Motion;Stroke;Transfers   Role of Occupational Therapist Patient Response Patient;Family;Acceptance;Explanation;Verbal Demonstration   Joint Protection Patient Response Patient;Family;Acceptance;Explanation;Demonstration;Verbal Demonstration   Upper Ext ROM Patient Response Patient;Family;Acceptance;Explanation;Demonstration;Verbal Demonstration   Transfers Patient Response Patient;Family;Acceptance;Explanation;Demonstration;Verbal Demonstration   Stroke Patient Response Patient;Family;Acceptance;Explanation;Demonstration;Verbal Demonstration   ADL Patient Response Patient;Acceptance;Explanation;Demonstration;Verbal Demonstration;Action Demonstration   Occupational Therapy Treatment Plan    O.T. Treatment Plan Continue Current Treatment Plan   Anticipated Discharge Equipment and Recommendations   DC Equipment Recommendations Unable to determine at this time   Discharge Recommendations Recommend post-acute placement for additional occupational therapy services prior to discharge home   Interdisciplinary Plan of Care Collaboration   IDT Collaboration with  Nursing;Family / Caregiver;Physical Therapist   Patient  Position at End of Therapy Seated;Chair Alarm On;Call Light within Reach;Tray Table within Reach;Phone within Reach;Family / Friend in Room   Collaboration Comments Report given to RN; Co-treat with PT for pt safety due to medical complexity, management of lines/drains, joint protection during transfers, and anticipated assist required. Each discipline focused on goals within their role. OT focused on UE ROM, joint protection, edema management, visual scanning, and ADL performance.

## 2024-07-12 NOTE — PROGRESS NOTES
4 Eyes Skin Assessment Completed by CRISTIANA Oviedo and CRISTIANA Gilbert.    Head Bruising and Edema, L forehead  Ears WDL  Nose WDL  Mouth WDL  Neck WDL  Breast/Chest WDL  Shoulder Blades WDL  Spine WDL  (R) Arm/Elbow/Hand Redness and Blanching  (L) Arm/Elbow/Hand Redness, Blanching, Bruising, and Swelling  Abdomen WDL  Groin Bruising from Right Groin Site    Scrotum/Coccyx/Buttocks WDL  (R) Leg WDL  (L) Leg WDL  (R) Heel/Foot/Toe WDL  (L) Heel/Foot/Toe WDL          Devices In Places Ortho Boot/Shoe      Interventions In Place Pillows    Possible Skin Injury No    Pictures Uploaded Into Epic N/A  Wound Consult Placed N/A  RN Wound Prevention Protocol Ordered No

## 2024-07-12 NOTE — DISCHARGE PLANNING
Case Management Discharge Planning    Admission Date: 7/5/2024  GMLOS: 5  ALOS: 7    6-Clicks ADL Score: 12  6-Clicks Mobility Score: 10  PT and/or OT Eval ordered: Yes  Post-acute Referrals Ordered: Yes  Post-acute Choice Obtained: NA  Has referral(s) been sent to post-acute provider:  Yes      Anticipated Discharge Dispo: Discharge Disposition: D/T to Medicaid only Nursing home (64)  Ellie was discussed in IDT rounds.  She does have transfer orders.  DPA doing follow up with pending SNFs.  Referral sent to [facility].    DME Needed: No    Action(s) Taken: Referral(s) sent    Escalations Completed: None    Medically Clear: No    Next Steps: Find accepting SNF; complete PASRR/LOC    Barriers to Discharge: Medical clearance and Pending Placement    Is the patient up for discharge tomorrow: No

## 2024-07-12 NOTE — DIETARY
Nutrition Update: Follow-up for PO intake  Day 7 of admit.  Ellie Sánchez is a 33 y.o. female with admitting DX of Acute ischemic right MCA stroke (HCC) [I63.511].    Current Diet: Level 6 soft/bite-sized w/ Level 0 - thin liquids w/ 1500ml fluid restriction w/ 1:1 supervision feeding.    Per ADLs, PO variable from o% to % w/ avg of 48%.     Visited pt at bedside. Pt said expressed her food preferences saying that she doesn't like cheese, potatoes, sausage, or spicy foods. Pt said that she likes yogurt, juice, eggs, rice, pancakes, and blueberry muffin cup. She would like greek yogurt TID. Pt said she was eating her Magic Cup which she receives BID.     Malnutrition risk: Does not meet ASPEN criteria at this time.     Problem: Nutritional:  Goal: Achieve adequate nutritional intake  Description: Patient will consume >50% of meals  Outcome: Progressing.     RD following.

## 2024-07-12 NOTE — CARE PLAN
The patient is Stable - Low risk of patient condition declining or worsening    Shift Goals  Clinical Goals: Q4 neuros, SBP110-160: comfort  Patient Goals: Rest  Family Goals: Updates    Progress made toward(s) clinical / shift goals:    Problem: Knowledge Deficit - Stroke Education  Goal: Patient's knowledge of stroke and risk factors will improve  Outcome: Progressing     Problem: Psychosocial - Patient Condition  Goal: Patient's ability to verbalize feelings about condition will improve  Outcome: Progressing  Goal: Patient's ability to re-evaluate and adapt role responsibilities will improve  Outcome: Progressing     Problem: Pain - Standard  Goal: Alleviation of pain or a reduction in pain to the patient’s comfort goal  Outcome: Progressing     Problem: Skin Integrity  Goal: Skin integrity is maintained or improved  Outcome: Progressing     Problem: Fall Risk  Goal: Patient will remain free from falls  Outcome: Progressing       Patient is not progressing towards the following goals:

## 2024-07-12 NOTE — CARE PLAN
The patient is Stable - Low risk of patient condition declining or worsening    Shift Goals  Clinical Goals: Q 4 Neuro, -160  Patient Goals: Rest, Up to chair  Family Goals: Updates    Progress made toward(s) clinical / shift goals:      Problem: Optimal Care of the Stroke Patient  Goal: Optimal acute care for the stroke patient  Outcome: Progressing     Problem: Neuro Status  Goal: Neuro status will remain stable or improve  Outcome: Progressing     Problem: Urinary Elimination  Goal: Establish and maintain regular urinary output  Outcome: Progressing     Problem: Mobility - Stroke  Goal: Patient's capacity to carry out activities will improve  Outcome: Progressing     Problem: Knowledge Deficit - Standard  Goal: Patient and family/care givers will demonstrate understanding of plan of care, disease process/condition, diagnostic tests and medications  Outcome: Progressing     Problem: Pain - Standard  Goal: Alleviation of pain or a reduction in pain to the patient’s comfort goal  Outcome: Progressing

## 2024-07-13 LAB
AT III ACT/NOR PPP CHRO: 98 % (ref 76–128)
AT III AG ACT/NOR PPP IA: 90 % (ref 82–136)

## 2024-07-13 PROCEDURE — 99233 SBSQ HOSP IP/OBS HIGH 50: CPT | Performed by: HOSPITALIST

## 2024-07-13 PROCEDURE — 700102 HCHG RX REV CODE 250 W/ 637 OVERRIDE(OP): Performed by: INTERNAL MEDICINE

## 2024-07-13 PROCEDURE — A9270 NON-COVERED ITEM OR SERVICE: HCPCS | Performed by: HOSPITALIST

## 2024-07-13 PROCEDURE — 700111 HCHG RX REV CODE 636 W/ 250 OVERRIDE (IP): Mod: JZ | Performed by: INTERNAL MEDICINE

## 2024-07-13 PROCEDURE — 700102 HCHG RX REV CODE 250 W/ 637 OVERRIDE(OP): Performed by: HOSPITALIST

## 2024-07-13 PROCEDURE — 770001 HCHG ROOM/CARE - MED/SURG/GYN PRIV*

## 2024-07-13 PROCEDURE — A9270 NON-COVERED ITEM OR SERVICE: HCPCS | Performed by: INTERNAL MEDICINE

## 2024-07-13 RX ADMIN — SODIUM CHLORIDE 1 G: 1 TABLET ORAL at 05:21

## 2024-07-13 RX ADMIN — NITROFURANTOIN MONOHYDRATE/MACROCRYSTALS 100 MG: 75; 25 CAPSULE ORAL at 09:21

## 2024-07-13 RX ADMIN — HYDROXYCHLOROQUINE SULFATE 200 MG: 200 TABLET ORAL at 05:21

## 2024-07-13 RX ADMIN — GABAPENTIN 300 MG: 300 CAPSULE ORAL at 05:21

## 2024-07-13 RX ADMIN — ONDANSETRON 4 MG: 2 INJECTION INTRAMUSCULAR; INTRAVENOUS at 08:32

## 2024-07-13 RX ADMIN — APIXABAN 5 MG: 5 TABLET, FILM COATED ORAL at 05:19

## 2024-07-13 RX ADMIN — APIXABAN 5 MG: 5 TABLET, FILM COATED ORAL at 18:18

## 2024-07-13 RX ADMIN — ASPIRIN 81 MG: 81 TABLET, COATED ORAL at 05:21

## 2024-07-13 RX ADMIN — QUETIAPINE FUMARATE 50 MG: 25 TABLET ORAL at 20:50

## 2024-07-13 RX ADMIN — BACLOFEN 5 MG: 10 TABLET ORAL at 12:41

## 2024-07-13 RX ADMIN — SODIUM CHLORIDE 1 G: 1 TABLET ORAL at 14:53

## 2024-07-13 RX ADMIN — BACLOFEN 5 MG: 10 TABLET ORAL at 18:18

## 2024-07-13 RX ADMIN — GABAPENTIN 300 MG: 300 CAPSULE ORAL at 18:18

## 2024-07-13 RX ADMIN — PHENAZOPYRIDINE 200 MG: 200 TABLET ORAL at 11:50

## 2024-07-13 RX ADMIN — ESCITALOPRAM OXALATE 10 MG: 10 TABLET ORAL at 05:21

## 2024-07-13 RX ADMIN — ROSUVASTATIN CALCIUM 5 MG: 5 TABLET, FILM COATED ORAL at 18:18

## 2024-07-13 RX ADMIN — DOCUSATE SODIUM 100 MG: 100 CAPSULE, LIQUID FILLED ORAL at 18:18

## 2024-07-13 RX ADMIN — NITROFURANTOIN MONOHYDRATE/MACROCRYSTALS 100 MG: 75; 25 CAPSULE ORAL at 18:18

## 2024-07-13 RX ADMIN — SODIUM CHLORIDE 1 G: 1 TABLET ORAL at 20:50

## 2024-07-13 RX ADMIN — PHENAZOPYRIDINE 200 MG: 200 TABLET ORAL at 18:18

## 2024-07-13 RX ADMIN — BACLOFEN 5 MG: 10 TABLET ORAL at 05:20

## 2024-07-13 RX ADMIN — FOLIC ACID 2 MG: 1 TABLET ORAL at 05:21

## 2024-07-13 ASSESSMENT — PATIENT HEALTH QUESTIONNAIRE - PHQ9
SUM OF ALL RESPONSES TO PHQ9 QUESTIONS 1 AND 2: 0
1. LITTLE INTEREST OR PLEASURE IN DOING THINGS: NOT AT ALL
2. FEELING DOWN, DEPRESSED, IRRITABLE, OR HOPELESS: NOT AT ALL

## 2024-07-13 ASSESSMENT — ENCOUNTER SYMPTOMS
NAUSEA: 0
VOMITING: 0
CHILLS: 0
SHORTNESS OF BREATH: 0
COUGH: 0
ABDOMINAL PAIN: 0
FOCAL WEAKNESS: 1
FEVER: 0
DIARRHEA: 0
HEADACHES: 0
BACK PAIN: 0
DIZZINESS: 0

## 2024-07-13 ASSESSMENT — PAIN DESCRIPTION - PAIN TYPE
TYPE: ACUTE PAIN
TYPE: ACUTE PAIN

## 2024-07-13 ASSESSMENT — FIBROSIS 4 INDEX: FIB4 SCORE: 0.52

## 2024-07-13 NOTE — CARE PLAN
The patient is   Problem: Optimal Care of the Stroke Patient  Goal: Optimal emergency care for the stroke patient  Description: Target End Date:  End of day 1    Time of Onset    1.  Time of last known well obtained  2.  Patient and family/caregiver verbalize understanding of diagnosis, medications and testing  3.  NIHSS performed and documented, including date and time, for ischemic stroke patients prior to any acute recanalization therapy (thrombolytics or mechanical) or within 12 hours of arrival if no intervention is warranted  4.  Consults and referrals placed to appropriate departments    Medications Administration as Ordered:    1.  Implement appropriate reversal agents for INR greater than 1.5  2.  Pre-alteplase administration of antihypertensives for SBP >185 DBP >110  3.  Post-alteplase administration of antihypertensives for SBP >185, DBP >105  4.  Thrombolytic Therapy for qualifying ischemic stroke patients who arrive within 4.5 hours of time of Last Known Well. Thrombolytic therapy administered within 30 minutes or a documented reason for delay  Outcome: Progressing     Problem: Optimal Care of the Stroke Patient  Goal: Optimal acute care for the stroke patient  Description: Target End Date:  1 to 3 days    - Vital signs and neuro checks performed and documented per order  - NIHSS completed and documented per order  - Continuous telemetry monitoring for 72 hours or until discontinued by provider  - Head CT without contrast obtained  - Consideration of MRI/MRA  - MRI screening form completed in worklist if MRI ordered  - Echocardiogram with Bubble Study ordered/completed with consideration of JOSSE  - Carotid Doppler ordered/completed (Not required if CTA of neck completed in ED)  - Lipid Panel obtained within 48 hours of admission  - PT, PTT, INR obtained per Anticoagulation orders (if applicable)  - Antithrombotic therapy by end of hospital day 2 for ischemic stroke. Provider must document reason if  contraindicated.  - Venous Thromboembolism (VTE) Prophylaxis by end of hospital day 2 for ischemic and hemorrhagic stroke. Provider must document reason if contraindicated  - Dysphagia screen completed and documented prior to any PO intake. Patient to remain NPO until Speech Therapy evaluation if thrombolytic or thrombectomy performed  - Rehabilitation assessment including PT/OT/SLP evaluations for referral to Physical Medicine and Rehabilitation services. If none needed, provider needs to document reason  - Neurology consult placed  - Consideration of cardiology consult for cryptogenic strokes  Outcome: Progressing     Problem: Neuro Status  Goal: Neuro status will remain stable or improve  Description: Target End Date:  Prior to discharge or change in level of care    Document on Neuro assessment in the Assessment flowsheet    1.  Assess and monitor neurologic status per provider order/protocol/unit policy  2.  Assess level of consciousness and orientation  3.  Assess for speech, dysarthria, dysphagia, facial symmetry  4.  Assess visual field, eye movements, gaze preference, pupil reaction and size  5.  Assess muscle strength and motor response in all four extremities  6.  Assess for sensation (numbness and tingling)  7.  Assess basic neuro reflexes (cough, gag, corneal)  8.  Identify changes in neuro status and report to provider for testing/treatment orders  Outcome: Progressing     Problem: Hemodynamic Monitoring  Goal: Patient's hemodynamics, fluid balance and neurologic status will be stable or improve  Description: Target End Date:  Prior to discharge or change in level of care    1.  Vital signs, pulse oximetry and cardiac monitor per provider order and/or policy  2.  Frequent pulse checks performed post thrombectomy  3.  Frequent monitoring for signs of bleeding post TPA administration  4.  Proper management of IV infusions  5.  Intake and output monitored per provider order  6.  Daily weight obtained per  unit policy or provider order  7.  Peripheral pulses and capillary refill assessed as needed  8.  Monitor for signs/symptoms of excessive bleeding  9.  Body temperature assessed and fevers treated  10. Patient positioned for maximum circulation/cardiac output  Outcome: Progressing       Shift Goals  Clinical Goals: Monitor neuro status  Patient Goals: sleep  Family Goals: comfort    Progress made toward(s) clinical / shift goals:  Patient is A0x 4 , stroke education done . Mobilized using maulik stedy . Positioned comfortably in bed. Partial bed bath done . Hourly rounds to ensure safety and comfort . No acute change noted .     Patient is not progressing towards the following goals:

## 2024-07-13 NOTE — PROGRESS NOTES
Small arm sling applied to pt LUE. Pt tolerating sling well at this time    Contact traction for any questions or concerns

## 2024-07-14 LAB
ALBUMIN SERPL BCP-MCNC: 3.8 G/DL (ref 3.2–4.9)
ANION GAP SERPL CALC-SCNC: 12 MMOL/L (ref 7–16)
BACTERIA UR CULT: ABNORMAL
BACTERIA UR CULT: ABNORMAL
BUN SERPL-MCNC: 9 MG/DL (ref 8–22)
CALCIUM ALBUM COR SERPL-MCNC: 9.7 MG/DL (ref 8.5–10.5)
CALCIUM SERPL-MCNC: 9.5 MG/DL (ref 8.5–10.5)
CHLORIDE SERPL-SCNC: 104 MMOL/L (ref 96–112)
CO2 SERPL-SCNC: 23 MMOL/L (ref 20–33)
CREAT SERPL-MCNC: 0.53 MG/DL (ref 0.5–1.4)
ERYTHROCYTE [DISTWIDTH] IN BLOOD BY AUTOMATED COUNT: 53.1 FL (ref 35.9–50)
F2 C.20210G>A GENO BLD/T: NEGATIVE
GFR SERPLBLD CREATININE-BSD FMLA CKD-EPI: 123 ML/MIN/1.73 M 2
GLUCOSE SERPL-MCNC: 102 MG/DL (ref 65–99)
HCT VFR BLD AUTO: 40.2 % (ref 37–47)
HGB BLD-MCNC: 13 G/DL (ref 12–16)
MCH RBC QN AUTO: 32.4 PG (ref 27–33)
MCHC RBC AUTO-ENTMCNC: 32.3 G/DL (ref 32.2–35.5)
MCV RBC AUTO: 100.2 FL (ref 81.4–97.8)
PHOSPHATE SERPL-MCNC: 3.6 MG/DL (ref 2.5–4.5)
PLATELET # BLD AUTO: 450 K/UL (ref 164–446)
PMV BLD AUTO: 9.3 FL (ref 9–12.9)
POTASSIUM SERPL-SCNC: 4.2 MMOL/L (ref 3.6–5.5)
RBC # BLD AUTO: 4.01 M/UL (ref 4.2–5.4)
SIGNIFICANT IND 70042: ABNORMAL
SITE SITE: ABNORMAL
SODIUM SERPL-SCNC: 139 MMOL/L (ref 135–145)
SOURCE SOURCE: ABNORMAL
WBC # BLD AUTO: 9.2 K/UL (ref 4.8–10.8)

## 2024-07-14 PROCEDURE — 700102 HCHG RX REV CODE 250 W/ 637 OVERRIDE(OP): Performed by: INTERNAL MEDICINE

## 2024-07-14 PROCEDURE — 99232 SBSQ HOSP IP/OBS MODERATE 35: CPT | Performed by: HOSPITALIST

## 2024-07-14 PROCEDURE — A9270 NON-COVERED ITEM OR SERVICE: HCPCS | Performed by: INTERNAL MEDICINE

## 2024-07-14 PROCEDURE — 80069 RENAL FUNCTION PANEL: CPT

## 2024-07-14 PROCEDURE — 700102 HCHG RX REV CODE 250 W/ 637 OVERRIDE(OP): Performed by: HOSPITALIST

## 2024-07-14 PROCEDURE — 85027 COMPLETE CBC AUTOMATED: CPT

## 2024-07-14 PROCEDURE — 770020 HCHG ROOM/CARE - TELE (206)

## 2024-07-14 PROCEDURE — 36415 COLL VENOUS BLD VENIPUNCTURE: CPT

## 2024-07-14 PROCEDURE — A9270 NON-COVERED ITEM OR SERVICE: HCPCS | Performed by: HOSPITALIST

## 2024-07-14 RX ADMIN — APIXABAN 5 MG: 5 TABLET, FILM COATED ORAL at 18:32

## 2024-07-14 RX ADMIN — GABAPENTIN 300 MG: 300 CAPSULE ORAL at 18:32

## 2024-07-14 RX ADMIN — BACLOFEN 5 MG: 10 TABLET ORAL at 13:49

## 2024-07-14 RX ADMIN — PHENAZOPYRIDINE 200 MG: 200 TABLET ORAL at 18:32

## 2024-07-14 RX ADMIN — PHENAZOPYRIDINE 200 MG: 200 TABLET ORAL at 13:49

## 2024-07-14 RX ADMIN — FOLIC ACID 2 MG: 1 TABLET ORAL at 05:22

## 2024-07-14 RX ADMIN — HYDROXYCHLOROQUINE SULFATE 200 MG: 200 TABLET ORAL at 05:22

## 2024-07-14 RX ADMIN — ROSUVASTATIN CALCIUM 5 MG: 5 TABLET, FILM COATED ORAL at 18:32

## 2024-07-14 RX ADMIN — NITROFURANTOIN MONOHYDRATE/MACROCRYSTALS 100 MG: 75; 25 CAPSULE ORAL at 18:32

## 2024-07-14 RX ADMIN — SODIUM CHLORIDE 1 G: 1 TABLET ORAL at 23:36

## 2024-07-14 RX ADMIN — APIXABAN 5 MG: 5 TABLET, FILM COATED ORAL at 05:22

## 2024-07-14 RX ADMIN — DOCUSATE SODIUM 100 MG: 100 CAPSULE, LIQUID FILLED ORAL at 18:32

## 2024-07-14 RX ADMIN — SODIUM CHLORIDE 1 G: 1 TABLET ORAL at 05:22

## 2024-07-14 RX ADMIN — BACLOFEN 5 MG: 10 TABLET ORAL at 18:42

## 2024-07-14 RX ADMIN — QUETIAPINE FUMARATE 50 MG: 25 TABLET ORAL at 20:31

## 2024-07-14 RX ADMIN — PHENAZOPYRIDINE 200 MG: 200 TABLET ORAL at 08:11

## 2024-07-14 RX ADMIN — BACLOFEN 5 MG: 10 TABLET ORAL at 05:21

## 2024-07-14 RX ADMIN — HYDROCODONE BITARTRATE AND ACETAMINOPHEN 1 TABLET: 5; 325 TABLET ORAL at 20:31

## 2024-07-14 RX ADMIN — NITROFURANTOIN MONOHYDRATE/MACROCRYSTALS 100 MG: 75; 25 CAPSULE ORAL at 10:31

## 2024-07-14 RX ADMIN — ASPIRIN 81 MG: 81 TABLET, COATED ORAL at 05:21

## 2024-07-14 RX ADMIN — SODIUM CHLORIDE 1 G: 1 TABLET ORAL at 13:49

## 2024-07-14 RX ADMIN — ESCITALOPRAM OXALATE 10 MG: 10 TABLET ORAL at 05:22

## 2024-07-14 RX ADMIN — GABAPENTIN 300 MG: 300 CAPSULE ORAL at 05:22

## 2024-07-14 RX ADMIN — DOCUSATE SODIUM 100 MG: 100 CAPSULE, LIQUID FILLED ORAL at 05:23

## 2024-07-14 ASSESSMENT — ENCOUNTER SYMPTOMS
SHORTNESS OF BREATH: 0
CHILLS: 0
COUGH: 0
ABDOMINAL PAIN: 0
FOCAL WEAKNESS: 1
FEVER: 0
BACK PAIN: 0
NAUSEA: 0
HEADACHES: 0
DIZZINESS: 0
VOMITING: 0
DIARRHEA: 0

## 2024-07-14 ASSESSMENT — PAIN DESCRIPTION - PAIN TYPE: TYPE: ACUTE PAIN

## 2024-07-14 NOTE — CARE PLAN
The patient is Stable - Low risk of patient condition declining or worsening    Shift Goals  Clinical Goals: Monitor neuro status  Patient Goals: sleep  Family Goals: comfort      Problem: Knowledge Deficit - Standard  Goal: Patient and family/care givers will demonstrate understanding of plan of care, disease process/condition, diagnostic tests and medications  Description: Target End Date:  1-3 days or as soon as patient condition allows    Document in Patient Education    1.  Patient and family/caregiver oriented to unit, equipment, visitation policy and means for communicating concern  2.  Complete/review Learning Assessment  3.  Assess knowledge level of disease process/condition, treatment plan, diagnostic tests and medications  4.  Explain disease process/condition, treatment plan, diagnostic tests and medications  Outcome: Progressing     Problem: Pain - Standard  Goal: Alleviation of pain or a reduction in pain to the patient’s comfort goal  Description: Target End Date:  Prior to discharge or change in level of care    Document on Vitals flowsheet    1.  Document pain using the appropriate pain scale per order or unit policy  2.  Educate and implement non-pharmacologic comfort measures (i.e. relaxation, distraction, massage, cold/heat therapy, etc.)  3.  Pain management medications as ordered  4.  Reassess pain after pain med administration per policy  5.  If opiods administered assess patient's response to pain medication is appropriate per POSS sedation scale  6.  Follow pain management plan developed in collaboration with patient and interdisciplinary team (including palliative care or pain specialists if applicable)  Outcome: Progressing

## 2024-07-14 NOTE — PROGRESS NOTES
Hospital Medicine Daily Progress Note    Date of Service  7/13/2024    Chief Complaint  Ellie Sánchez is a 33 y.o. female admitted 7/5/2024 with L side weakness    Hospital Course  33 y.o. female who presented 7/5/2024 with a history of thrombocytosis, migraine, lupus, and rheumatoid arthritis.  She had been maintained on Plaquenil, and methotrexate for some time however the methotrexate was stopped, and the patient was started on Rinvoq recently.  Patient presented on July 5 after developing left-sided weakness suddenly which lasted about 30 minutes.  It resolved by the time she arrived to the emergency room.  She had CT imaging there which was interpreted as unremarkable.  And was sent to us.  Here she had a recurrence of her symptoms.  Imaging was repeated, and she was found to have evidence of a right M2 thrombus.  Neurology was consulted, and the patient was given IV TNK and taken emergently to the IR suite.  There she underwent mechanical thrombectomy with TICI 2C flow postprocedure.     Interval Problem Update  7/12: Pt notes some pain when she pees; feels like a UTI.  L leg is a little stronger today  7/13: Having slight improvement of left leg weakness but still having significant left upper extremity weakness.  Still having dysuria.  Discussed with patient and family about possible hypercoagulable state versus arrhythmia such as A-fib.  Antithrombin III negative.  Total time 51 minutes.    I have discussed this patient's plan of care and discharge plan at IDT rounds today with Case Management, Nursing, Nursing leadership, and other members of the IDT team.    Consultants/Specialty  neurology    Code Status  Full Code    Disposition  The patient is not medically cleared for discharge to home or a post-acute facility.      I have placed the appropriate orders for post-discharge needs.  Will need Zio patch upon discharge    Review of Systems  Review of Systems   Constitutional:  Negative for chills  and fever.   Respiratory:  Negative for cough and shortness of breath.    Cardiovascular:  Negative for chest pain and leg swelling.   Gastrointestinal:  Negative for abdominal pain, diarrhea, nausea and vomiting.   Genitourinary:  Positive for dysuria, frequency and urgency.   Musculoskeletal:  Negative for back pain.   Skin:  Negative for rash.   Neurological:  Positive for focal weakness. Negative for dizziness and headaches.        Physical Exam  Temp:  [36.3 °C (97.3 °F)-36.6 °C (97.9 °F)] 36.5 °C (97.7 °F)  Pulse:  [] 98  Resp:  [16] 16  BP: (114-116)/(69-72) 114/70  SpO2:  [91 %-94 %] 93 %    Physical Exam  Constitutional:       General: She is not in acute distress.     Appearance: Normal appearance. She is well-developed. She is not diaphoretic.   HENT:      Head: Normocephalic and atraumatic.   Neck:      Vascular: No JVD.   Cardiovascular:      Rate and Rhythm: Normal rate and regular rhythm.      Heart sounds:      No gallop.   Pulmonary:      Effort: Pulmonary effort is normal.      Breath sounds: No rales.   Abdominal:      Palpations: Abdomen is soft.      Tenderness: There is no abdominal tenderness. There is no guarding.   Skin:     General: Skin is warm and dry.      Findings: No rash.   Neurological:      Mental Status: She is oriented to person, place, and time.      Comments: Alert interactive  Speech clear  Face symmetric  Tongue midline  EOMI  Attends to the L spontanesouly  L arm flacid  L leg 2/5   Psychiatric:         Mood and Affect: Mood normal.         Thought Content: Thought content normal.         Fluids    Intake/Output Summary (Last 24 hours) at 7/13/2024 9399  Last data filed at 7/13/2024 0458  Gross per 24 hour   Intake --   Output 225 ml   Net -225 ml       Laboratory  Recent Labs     07/11/24  0545 07/12/24  0405   WBC 8.8 9.5   RBC 3.18* 3.36*   HEMOGLOBIN 10.2* 10.9*   HEMATOCRIT 31.1* 32.6*   MCV 97.8 97.0   MCH 32.1 32.4   MCHC 32.8 33.4   RDW 49.5 48.9   PLATELETCT  387 418   MPV 9.0 8.9*     Recent Labs     07/10/24  2357 07/11/24  0545 07/12/24  0405   SODIUM 144 142 141   POTASSIUM  --  3.5* 3.8   CHLORIDE  --  109 107   CO2  --  23 24   GLUCOSE  --  95 102*   BUN  --  4* 5*   CREATININE  --  0.48* 0.42*   CALCIUM  --  8.3* 8.8                   Imaging  DX-SHOULDER 2+ LEFT   Final Result      1.  Normal shoulder series.      CT-HEAD W/O   Final Result         1. Limited by motion.   2. No definite new abnormality.               DX-CHEST-LIMITED (1 VIEW)   Final Result         New right IJ central venous catheter is in good position without a pneumothorax.      CT-HEAD W/O   Final Result      Diffuse low-attenuation of the right hemisphere is consistent with a MCA territory infarct. Small areas of increased density within this region may represent petechial hemorrhages. There is effacement of the right lateral ventricle with approximately 3    mm of midline shift.               CT-CTA PELVIS WITH & W/O-POST PROCESS   Final Result      1.  No CTA evidence of active extravasation of contrast to suggest active bleeding.   2.  There is a subcutaneous hematoma in the right groin adjacent to the common femoral vessels.   3.  Contrast in the urinary bladder from the angiographic procedure earlier today.      EC-ECHOCARDIOGRAM COMPLETE W/O CONT   Final Result      MR-BRAIN-W/O   Final Result         Acute infarct in the right frontoparietal region and right insula predominantly involving the cortex. Acute infarct also noted in the right caudate and putamen.      Minimal subarachnoid hemorrhage noted in the sylvian fissure and the sulci over the convexity, likely procedure related.      No midline shift or significant mass effect.      IR-THROMBO MECHANICAL ARTERY,INIT   Final Result         33-year-old who presented with left-sided symptoms was found to have a right MCA M3 occlusion with a perfusion defect identified on CT perfusion imaging. Patient underwent emergent mechanical  thrombectomy with multiple attempts to recanalize the parietal    M3 branch being unsuccessful. Final angiographic images demonstrate good antegrade flow in the MCA branches other than the occluded right MCA M3 parietal branch.      Short segment dissection of the right ICA just below the skull base was identified on the final angiogram.  Findings discussed with Dr. Fernandez and given the nonprogressive nature of the dissection without flow limitation, the dissection will be managed by    initiation of antithrombotic/anticoagulation therapy.      Final recanalization score: TICI 2 C      I, Kavita Matson was physically present and participated during the entire procedure of the IR-THROMBO MECHANICAL ARTERY,INIT.                  DX-CHEST-PORTABLE (1 VIEW)   Final Result      Mild interstitial prominence could be related to hypoinflation. No consolidation or pleural effusion.      CT-CTA NECK WITH & W/O-POST PROCESSING   Final Result      CT angiogram of the neck within normal limits.      CT-CTA HEAD WITH & W/O-POST PROCESS   Final Result      Occlusion of the superior sylvian branch M2 segment RIGHT middle cerebral artery.      These findings were discussed with GAIL AGUIRRE II on 7/5/2024 5:33 AM.            CT-CEREBRAL PERFUSION ANALYSIS   Final Result      1. Cerebral blood flow less than 30% possibly representing completed infarct = 5 mL. Based on distribution of this finding, this is unlikely to represent artifact.      2. T Max more than 6 seconds possibly representing combination of completed infarct and ischemia = 28 mL. Based on the distribution of this finding, this is unlikely to represent artifact.      3. Mismatched volume possibly representing ischemic brain/penumbra= 23 mL      4.  Please note that this cerebral perfusion study and report is Quantitative and targets supratentorial (cerebral) perfusion for evaluation of large vessel territory acute ischemia/infarction. For example, lacunar  "infarcts, and brainstem/posterior fossa    ischemia/infarction are not evaluated on this study.  Data acquisition is subject to artifacts which can yield non-anatomically plausible perfusion maps which may be due to motion, bolus timing, signal to noise ratio, or other technical factors.    Perfusion map abnormalities which show non-anatomic distributions are likely artifact.   This study is not \"stand-alone\" and should only be utilized for diagnosis, management/treatment in correlation with CT, CTA, and/or MRI and clinical factors.              Assessment/Plan  * Acute ischemic right MCA stroke (HCC)- (present on admission)  Assessment & Plan  R M2  S/p TNK and mechanical thrombectomy  Possibly related to underlying hypercoagulable state (Hx SLE/RA) vs MAB vs cardio-embolic  Antithrombin III negative.  Factor II and V pending  MRI confirms R MCA infarct, no other findings  TTE benign  Neuro and Physiatry consulted  LDL 88, A1c<6, Statin  Transitioned to apixaban 5mg BID  ASA for 3 months for ICA disection  DC on Zio patch    Acute cystitis without hematuria  Assessment & Plan  UA Nit/LE pos with bacteria on microscopy  Start macrobid, pyridium  Urine sent for culture  Frequently gets yeast infections with Abx's: diflucan 100mg po x 1    Anemia  Assessment & Plan  Reporducitve age female  Monitor  Follow up as outpt    Hyperglycemia  Assessment & Plan  A1c<5: stress response  monitor    Hypokalemia  Assessment & Plan  Replating as needed    Hypomagnesemia  Assessment & Plan  Replating as needed      SLE (systemic lupus erythematosus related syndrome) (HCC)- (present on admission)  Assessment & Plan  Had been on Plaquenil and methotrexate for some time and then was transition to Plaquenil and Rinvoq.  Given her recent thromboembolic event, we have stopped her invoke.  Continue on Plaquenil  Follow-up with her outpatient rheumatology team    Thrombocytosis- (present on admission)  Assessment & Plan  Patient has " chronic thrombocytosis with platelet count runs in the 500s     Mixed dyslipidemia- (present on admission)  Assessment & Plan  statin    Anxiety- (present on admission)  Assessment & Plan  Prn support         VTE prophylaxis: apixiban    I have performed a physical exam and reviewed and updated ROS and Plan today (7/13/2024). In review of yesterday's note (7/12/2024), there are no changes except as documented above.

## 2024-07-14 NOTE — CARE PLAN
Problem: Mobility - Stroke  Goal: Patient's capacity to carry out activities will improve  Description: Target End Date:  Prior to discharge or change in level of care    1.  Assess for barriers to mobility/activity  2.  Implement activity per interdisciplinary team recommendations  3.  Target activity level identified and patient/family/caregiver aware of goal  4.  Provide assistive devices  5.  Instruct patient/caregiver on proper use of assistive/adaptive devices  6.  Schedule activities and rest periods to decrease effects of fatigue  7.  Encourage mobilization to extent of ability  8.  Maintain proper body alignment  9.  Provide adequate pain management to allow progressive mobilization  10. Implement pace maker precautions as needed  Outcome: Progressing     Problem: Neuro Status  Goal: Neuro status will remain stable or improve  Description: Target End Date:  Prior to discharge or change in level of care    Document on Neuro assessment in the Assessment flowsheet    1.  Assess and monitor neurologic status per provider order/protocol/unit policy  2.  Assess level of consciousness and orientation  3.  Assess for speech, dysarthria, dysphagia, facial symmetry  4.  Assess visual field, eye movements, gaze preference, pupil reaction and size  5.  Assess muscle strength and motor response in all four extremities  6.  Assess for sensation (numbness and tingling)  7.  Assess basic neuro reflexes (cough, gag, corneal)  8.  Identify changes in neuro status and report to provider for testing/treatment orders  Outcome: Progressing     Problem: Optimal Care of the Stroke Patient  Goal: Optimal emergency care for the stroke patient  Description: Target End Date:  End of day 1    Time of Onset    1.  Time of last known well obtained  2.  Patient and family/caregiver verbalize understanding of diagnosis, medications and testing  3.  NIHSS performed and documented, including date and time, for ischemic stroke patients  prior to any acute recanalization therapy (thrombolytics or mechanical) or within 12 hours of arrival if no intervention is warranted  4.  Consults and referrals placed to appropriate departments    Medications Administration as Ordered:    1.  Implement appropriate reversal agents for INR greater than 1.5  2.  Pre-alteplase administration of antihypertensives for SBP >185 DBP >110  3.  Post-alteplase administration of antihypertensives for SBP >185, DBP >105  4.  Thrombolytic Therapy for qualifying ischemic stroke patients who arrive within 4.5 hours of time of Last Known Well. Thrombolytic therapy administered within 30 minutes or a documented reason for delay  Outcome: Progressing     The patient is Stable - Low risk of patient condition declining or worsening    Shift Goals  Clinical Goals: Monitor neuro status,comfort  Patient Goals: sleep  Family Goals: sleep    Progress made toward(s) clinical / shift goals:  Patient is A0x4. Able to stand and mobilized using maulik stedy . Urine urgency and incontinence still noted . Positioned comfortably in bed . Right shoulder sling support maintained . Hourly rounds to ensure safety and comfort .     Patient is not progressing towards the following goals:

## 2024-07-14 NOTE — PROGRESS NOTES
Hospital Medicine Daily Progress Note    Date of Service  7/14/2024    Chief Complaint  Ellie Sánchez is a 33 y.o. female admitted 7/5/2024 with L side weakness    Hospital Course  33 y.o. female who presented 7/5/2024 with a history of thrombocytosis, migraine, lupus, and rheumatoid arthritis.  She had been maintained on Plaquenil, and methotrexate for some time however the methotrexate was stopped, and the patient was started on Rinvoq recently.  Patient presented on July 5 after developing left-sided weakness suddenly which lasted about 30 minutes.  It resolved by the time she arrived to the emergency room.  She had CT imaging there which was interpreted as unremarkable.  And was sent to us.  Here she had a recurrence of her symptoms.  Imaging was repeated, and she was found to have evidence of a right M2 thrombus.  Neurology was consulted, and the patient was given IV TNK and taken emergently to the IR suite.  There she underwent mechanical thrombectomy with TICI 2C flow postprocedure.     Interval Problem Update  7/12: Pt notes some pain when she pees; feels like a UTI.  L leg is a little stronger today  7/13: Having slight improvement of left leg weakness but still having significant left upper extremity weakness.  Still having dysuria.  Discussed with patient and family about possible hypercoagulable state versus arrhythmia such as A-fib.  Antithrombin III negative.   7/14: Had some chest pain last night when laying down although this was positional.  Discussed need to continue boot.    I have discussed this patient's plan of care and discharge plan at IDT rounds today with Case Management, Nursing, Nursing leadership, and other members of the IDT team.    Consultants/Specialty  neurology    Code Status  Full Code    Disposition  The patient is not medically cleared for discharge to home or a post-acute facility.      I have placed the appropriate orders for post-discharge needs.  Will need Zio patch  upon discharge    Review of Systems  Review of Systems   Constitutional:  Negative for chills and fever.   Respiratory:  Negative for cough and shortness of breath.    Cardiovascular:  Positive for chest pain.   Gastrointestinal:  Negative for abdominal pain, diarrhea, nausea and vomiting.   Genitourinary:  Positive for dysuria, frequency and urgency.   Musculoskeletal:  Negative for back pain.   Skin:  Negative for rash.   Neurological:  Positive for focal weakness. Negative for dizziness and headaches.        Physical Exam  Temp:  [35.9 °C (96.6 °F)-36.5 °C (97.7 °F)] 36.1 °C (97 °F)  Pulse:  [] 85  Resp:  [16] 16  BP: (112-118)/(62-75) 118/75  SpO2:  [93 %-95 %] 95 %    Physical Exam  Constitutional:       General: She is not in acute distress.     Appearance: Normal appearance. She is well-developed. She is not diaphoretic.   HENT:      Head: Normocephalic and atraumatic.   Neck:      Vascular: No JVD.   Cardiovascular:      Rate and Rhythm: Normal rate and regular rhythm.      Heart sounds:      No gallop.   Pulmonary:      Effort: Pulmonary effort is normal.      Breath sounds: No rales.   Chest:      Chest wall: Tenderness present.   Abdominal:      Palpations: Abdomen is soft.      Tenderness: There is no abdominal tenderness. There is no guarding.   Skin:     General: Skin is warm and dry.      Findings: No rash.   Neurological:      Mental Status: She is oriented to person, place, and time.      Comments: Alert interactive  Speech clear  Face symmetric  Tongue midline  EOMI  Attends to the L spontanesouly  L arm flacid  L leg 2/5   Psychiatric:         Mood and Affect: Mood normal.         Thought Content: Thought content normal.         Fluids    Intake/Output Summary (Last 24 hours) at 7/14/2024 1615  Last data filed at 7/14/2024 1306  Gross per 24 hour   Intake 240 ml   Output --   Net 240 ml       Laboratory  Recent Labs     07/12/24  0405 07/14/24  0506   WBC 9.5 9.2   RBC 3.36* 4.01*    HEMOGLOBIN 10.9* 13.0   HEMATOCRIT 32.6* 40.2   MCV 97.0 100.2*   MCH 32.4 32.4   MCHC 33.4 32.3   RDW 48.9 53.1*   PLATELETCT 418 450*   MPV 8.9* 9.3     Recent Labs     07/12/24  0405 07/14/24  0506   SODIUM 141 139   POTASSIUM 3.8 4.2   CHLORIDE 107 104   CO2 24 23   GLUCOSE 102* 102*   BUN 5* 9   CREATININE 0.42* 0.53   CALCIUM 8.8 9.5                   Imaging  DX-SHOULDER 2+ LEFT   Final Result      1.  Normal shoulder series.      CT-HEAD W/O   Final Result         1. Limited by motion.   2. No definite new abnormality.               DX-CHEST-LIMITED (1 VIEW)   Final Result         New right IJ central venous catheter is in good position without a pneumothorax.      CT-HEAD W/O   Final Result      Diffuse low-attenuation of the right hemisphere is consistent with a MCA territory infarct. Small areas of increased density within this region may represent petechial hemorrhages. There is effacement of the right lateral ventricle with approximately 3    mm of midline shift.               CT-CTA PELVIS WITH & W/O-POST PROCESS   Final Result      1.  No CTA evidence of active extravasation of contrast to suggest active bleeding.   2.  There is a subcutaneous hematoma in the right groin adjacent to the common femoral vessels.   3.  Contrast in the urinary bladder from the angiographic procedure earlier today.      EC-ECHOCARDIOGRAM COMPLETE W/O CONT   Final Result      MR-BRAIN-W/O   Final Result         Acute infarct in the right frontoparietal region and right insula predominantly involving the cortex. Acute infarct also noted in the right caudate and putamen.      Minimal subarachnoid hemorrhage noted in the sylvian fissure and the sulci over the convexity, likely procedure related.      No midline shift or significant mass effect.      IR-THROMBO MECHANICAL ARTERY,INIT   Final Result         33-year-old who presented with left-sided symptoms was found to have a right MCA M3 occlusion with a perfusion defect  identified on CT perfusion imaging. Patient underwent emergent mechanical thrombectomy with multiple attempts to recanalize the parietal    M3 branch being unsuccessful. Final angiographic images demonstrate good antegrade flow in the MCA branches other than the occluded right MCA M3 parietal branch.      Short segment dissection of the right ICA just below the skull base was identified on the final angiogram.  Findings discussed with Dr. Fernandez and given the nonprogressive nature of the dissection without flow limitation, the dissection will be managed by    initiation of antithrombotic/anticoagulation therapy.      Final recanalization score: TICI 2 C      I, Kavita Matson was physically present and participated during the entire procedure of the IR-THROMBO MECHANICAL ARTERY,INIT.                  DX-CHEST-PORTABLE (1 VIEW)   Final Result      Mild interstitial prominence could be related to hypoinflation. No consolidation or pleural effusion.      CT-CTA NECK WITH & W/O-POST PROCESSING   Final Result      CT angiogram of the neck within normal limits.      CT-CTA HEAD WITH & W/O-POST PROCESS   Final Result      Occlusion of the superior sylvian branch M2 segment RIGHT middle cerebral artery.      These findings were discussed with GAIL AGUIRRE II on 7/5/2024 5:33 AM.            CT-CEREBRAL PERFUSION ANALYSIS   Final Result      1. Cerebral blood flow less than 30% possibly representing completed infarct = 5 mL. Based on distribution of this finding, this is unlikely to represent artifact.      2. T Max more than 6 seconds possibly representing combination of completed infarct and ischemia = 28 mL. Based on the distribution of this finding, this is unlikely to represent artifact.      3. Mismatched volume possibly representing ischemic brain/penumbra= 23 mL      4.  Please note that this cerebral perfusion study and report is Quantitative and targets supratentorial (cerebral) perfusion for evaluation of  "large vessel territory acute ischemia/infarction. For example, lacunar infarcts, and brainstem/posterior fossa    ischemia/infarction are not evaluated on this study.  Data acquisition is subject to artifacts which can yield non-anatomically plausible perfusion maps which may be due to motion, bolus timing, signal to noise ratio, or other technical factors.    Perfusion map abnormalities which show non-anatomic distributions are likely artifact.   This study is not \"stand-alone\" and should only be utilized for diagnosis, management/treatment in correlation with CT, CTA, and/or MRI and clinical factors.              Assessment/Plan  * Acute ischemic right MCA stroke (HCC)- (present on admission)  Assessment & Plan  R M2  S/p TNK and mechanical thrombectomy  Possibly related to underlying hypercoagulable state (Hx SLE/RA) vs MAB vs cardio-embolic  Antithrombin III negative.  Factor II and V pending  MRI confirms R MCA infarct, no other findings  TTE benign  Neuro and Physiatry consulted  LDL 88, A1c<6, Statin  Transitioned to apixaban 5mg BID  ASA for 3 months for ICA disection  DC on Zio patch    Acute cystitis without hematuria  Assessment & Plan  UA Nit/LE pos with bacteria on microscopy  macrobid, pyridium  Urine culture grew E. coli  Frequently gets yeast infections with Abx's: diflucan 100mg po x 1    Anemia  Assessment & Plan  Reporducitve age female  Monitor  Follow up as outpt    Hyperglycemia  Assessment & Plan  A1c<5: stress response  monitor    Hypokalemia  Assessment & Plan  Replating as needed    Hypomagnesemia  Assessment & Plan  Replating as needed      SLE (systemic lupus erythematosus related syndrome) (HCC)- (present on admission)  Assessment & Plan  Had been on Plaquenil and methotrexate for some time and then was transition to Plaquenil and Rinvoq.  Given her recent thromboembolic event, we have stopped her invoke.  Continue on Plaquenil  Follow-up with her outpatient rheumatology " team    Thrombocytosis- (present on admission)  Assessment & Plan  Patient has chronic thrombocytosis with platelet count runs in the 500s     Mixed dyslipidemia- (present on admission)  Assessment & Plan  statin    Anxiety- (present on admission)  Assessment & Plan  Prn support         VTE prophylaxis: apixiban    I have performed a physical exam and reviewed and updated ROS and Plan today (7/14/2024). In review of yesterday's note (7/13/2024), there are no changes except as documented above.

## 2024-07-14 NOTE — HOSPITAL COURSE
33 y.o. female who presented 7/5/2024 with a history of thrombocytosis, migraine, lupus, and rheumatoid arthritis.  She had been maintained on Plaquenil, and methotrexate for some time however the methotrexate was stopped, and the patient was started on Rinvoq recently.  Patient presented on July 5 after developing left-sided weakness suddenly which lasted about 30 minutes.  It resolved by the time she arrived to the emergency room.  She had CT imaging there which was interpreted as unremarkable.  And was sent to us.  Here she had a recurrence of her symptoms.  Imaging was repeated, and she was found to have evidence of a right M2 thrombus.  Neurology was consulted, and the patient was given IV TNK and taken emergently to the IR suite.  There she underwent mechanical thrombectomy with TICI 2C flow postprocedure.

## 2024-07-15 ENCOUNTER — NON-PROVIDER VISIT (OUTPATIENT)
Dept: CARDIOLOGY | Facility: MEDICAL CENTER | Age: 34
End: 2024-07-15
Payer: MEDICAID

## 2024-07-15 VITALS
SYSTOLIC BLOOD PRESSURE: 106 MMHG | BODY MASS INDEX: 38.69 KG/M2 | DIASTOLIC BLOOD PRESSURE: 69 MMHG | RESPIRATION RATE: 18 BRPM | OXYGEN SATURATION: 92 % | TEMPERATURE: 97.5 F | WEIGHT: 184.3 LBS | HEART RATE: 116 BPM | HEIGHT: 58 IN

## 2024-07-15 DIAGNOSIS — R00.0 SINUS TACHYCARDIA: ICD-10-CM

## 2024-07-15 DIAGNOSIS — I49.1 APC (ATRIAL PREMATURE CONTRACTIONS): ICD-10-CM

## 2024-07-15 DIAGNOSIS — I45.4 BBB (BUNDLE BRANCH BLOCK): ICD-10-CM

## 2024-07-15 DIAGNOSIS — G45.9 TRANSIENT CEREBRAL ISCHEMIA, UNSPECIFIED TYPE: ICD-10-CM

## 2024-07-15 DIAGNOSIS — I49.8 ACCELERATED JUNCTIONAL RHYTHM: ICD-10-CM

## 2024-07-15 DIAGNOSIS — I47.10 SVT (SUPRAVENTRICULAR TACHYCARDIA) (HCC): ICD-10-CM

## 2024-07-15 DIAGNOSIS — I63.9 CEREBROVASCULAR ACCIDENT (CVA), UNSPECIFIED MECHANISM (HCC): ICD-10-CM

## 2024-07-15 LAB
ALBUMIN SERPL BCP-MCNC: 3.9 G/DL (ref 3.2–4.9)
BUN SERPL-MCNC: 11 MG/DL (ref 8–22)
CALCIUM ALBUM COR SERPL-MCNC: 9.6 MG/DL (ref 8.5–10.5)
CALCIUM SERPL-MCNC: 9.5 MG/DL (ref 8.5–10.5)
CHLORIDE SERPL-SCNC: 103 MMOL/L (ref 96–112)
CO2 SERPL-SCNC: 22 MMOL/L (ref 20–33)
CREAT SERPL-MCNC: 0.52 MG/DL (ref 0.5–1.4)
ERYTHROCYTE [DISTWIDTH] IN BLOOD BY AUTOMATED COUNT: 52.1 FL (ref 35.9–50)
F5 P.R506Q BLD/T QL: NEGATIVE
GFR SERPLBLD CREATININE-BSD FMLA CKD-EPI: 124 ML/MIN/1.73 M 2
GLUCOSE SERPL-MCNC: 102 MG/DL (ref 65–99)
HCT VFR BLD AUTO: 39.6 % (ref 37–47)
HGB BLD-MCNC: 13 G/DL (ref 12–16)
MCH RBC QN AUTO: 32.6 PG (ref 27–33)
MCHC RBC AUTO-ENTMCNC: 32.8 G/DL (ref 32.2–35.5)
MCV RBC AUTO: 99.2 FL (ref 81.4–97.8)
PHOSPHATE SERPL-MCNC: 3.8 MG/DL (ref 2.5–4.5)
PLATELET # BLD AUTO: 466 K/UL (ref 164–446)
PMV BLD AUTO: 9.3 FL (ref 9–12.9)
POTASSIUM SERPL-SCNC: 4.2 MMOL/L (ref 3.6–5.5)
RBC # BLD AUTO: 3.99 M/UL (ref 4.2–5.4)
SODIUM SERPL-SCNC: 138 MMOL/L (ref 135–145)
WBC # BLD AUTO: 9.5 K/UL (ref 4.8–10.8)

## 2024-07-15 PROCEDURE — 700102 HCHG RX REV CODE 250 W/ 637 OVERRIDE(OP): Performed by: HOSPITALIST

## 2024-07-15 PROCEDURE — 80069 RENAL FUNCTION PANEL: CPT

## 2024-07-15 PROCEDURE — 97530 THERAPEUTIC ACTIVITIES: CPT | Mod: CQ

## 2024-07-15 PROCEDURE — A9270 NON-COVERED ITEM OR SERVICE: HCPCS | Performed by: INTERNAL MEDICINE

## 2024-07-15 PROCEDURE — A9270 NON-COVERED ITEM OR SERVICE: HCPCS | Performed by: HOSPITALIST

## 2024-07-15 PROCEDURE — 97112 NEUROMUSCULAR REEDUCATION: CPT | Mod: CQ

## 2024-07-15 PROCEDURE — 92526 ORAL FUNCTION THERAPY: CPT

## 2024-07-15 PROCEDURE — 85027 COMPLETE CBC AUTOMATED: CPT

## 2024-07-15 PROCEDURE — 36415 COLL VENOUS BLD VENIPUNCTURE: CPT

## 2024-07-15 PROCEDURE — 97110 THERAPEUTIC EXERCISES: CPT | Mod: CQ

## 2024-07-15 PROCEDURE — 700102 HCHG RX REV CODE 250 W/ 637 OVERRIDE(OP): Performed by: INTERNAL MEDICINE

## 2024-07-15 PROCEDURE — 99239 HOSP IP/OBS DSCHRG MGMT >30: CPT | Performed by: HOSPITALIST

## 2024-07-15 PROCEDURE — 97535 SELF CARE MNGMENT TRAINING: CPT | Mod: CQ

## 2024-07-15 RX ORDER — LORAZEPAM 1 MG/1
1 TABLET ORAL
Qty: 2 TABLET | Refills: 0 | Status: SHIPPED | OUTPATIENT
Start: 2024-07-15 | End: 2024-07-17

## 2024-07-15 RX ORDER — BACLOFEN 5 MG/1
5 TABLET ORAL 3 TIMES DAILY
Status: SHIPPED
Start: 2024-07-15

## 2024-07-15 RX ORDER — QUETIAPINE FUMARATE 50 MG/1
50 TABLET, FILM COATED ORAL NIGHTLY
Status: SHIPPED
Start: 2024-07-15

## 2024-07-15 RX ORDER — HYDROCODONE BITARTRATE AND ACETAMINOPHEN 5; 325 MG/1; MG/1
1 TABLET ORAL EVERY 8 HOURS PRN
Qty: 6 TABLET | Refills: 0 | Status: SHIPPED | OUTPATIENT
Start: 2024-07-15 | End: 2024-07-17

## 2024-07-15 RX ORDER — ROSUVASTATIN CALCIUM 5 MG/1
5 TABLET, COATED ORAL EVERY EVENING
Status: SHIPPED
Start: 2024-07-15

## 2024-07-15 RX ORDER — ESCITALOPRAM OXALATE 10 MG/1
10 TABLET ORAL DAILY
Status: SHIPPED
Start: 2024-07-16

## 2024-07-15 RX ORDER — ASPIRIN 81 MG/1
81 TABLET ORAL DAILY
Status: SHIPPED
Start: 2024-07-16

## 2024-07-15 RX ADMIN — FOLIC ACID 2 MG: 1 TABLET ORAL at 05:44

## 2024-07-15 RX ADMIN — PHENAZOPYRIDINE 200 MG: 200 TABLET ORAL at 14:03

## 2024-07-15 RX ADMIN — BACLOFEN 5 MG: 10 TABLET ORAL at 14:03

## 2024-07-15 RX ADMIN — PHENAZOPYRIDINE 200 MG: 200 TABLET ORAL at 09:14

## 2024-07-15 RX ADMIN — BACLOFEN 5 MG: 10 TABLET ORAL at 05:44

## 2024-07-15 RX ADMIN — SODIUM CHLORIDE 1 G: 1 TABLET ORAL at 05:44

## 2024-07-15 RX ADMIN — ASPIRIN 81 MG: 81 TABLET, COATED ORAL at 05:44

## 2024-07-15 RX ADMIN — GABAPENTIN 300 MG: 300 CAPSULE ORAL at 05:44

## 2024-07-15 RX ADMIN — ESCITALOPRAM OXALATE 10 MG: 10 TABLET ORAL at 05:44

## 2024-07-15 RX ADMIN — HYDROXYCHLOROQUINE SULFATE 400 MG: 200 TABLET ORAL at 05:44

## 2024-07-15 RX ADMIN — APIXABAN 5 MG: 5 TABLET, FILM COATED ORAL at 05:43

## 2024-07-15 ASSESSMENT — GAIT ASSESSMENTS: GAIT LEVEL OF ASSIST: UNABLE TO PARTICIPATE

## 2024-07-15 ASSESSMENT — COGNITIVE AND FUNCTIONAL STATUS - GENERAL
TURNING FROM BACK TO SIDE WHILE IN FLAT BAD: A LITTLE
STANDING UP FROM CHAIR USING ARMS: A LOT
MOVING TO AND FROM BED TO CHAIR: A LOT
MOVING FROM LYING ON BACK TO SITTING ON SIDE OF FLAT BED: A LOT
SUGGESTED CMS G CODE MODIFIER MOBILITY: CL
CLIMB 3 TO 5 STEPS WITH RAILING: TOTAL
MOBILITY SCORE: 11
WALKING IN HOSPITAL ROOM: TOTAL

## 2024-07-15 ASSESSMENT — FIBROSIS 4 INDEX: FIB4 SCORE: 0.47

## 2024-07-15 NOTE — DISCHARGE PLANNING
DC Transport Scheduled    Transport Company Scheduled:  [facility]  Spoke with Lashawn at [facility] to schedule transport.  [facility] Trip #: [id number]    Scheduled Date: 7/15/2024  Scheduled Time: 1400    Destination: [facility]   Destination address: [address] [place] [address] [id number] [address]    Notified care team of scheduled transport via Voalte.     If there are any changes needed to the DC transportation scheduled, please contact [facility] at ext. [phone] between the hours of 0732-7511 Mon-Fri. If outside those hours, contact the ED Case Manager at ext. [phone].

## 2024-07-15 NOTE — DISCHARGE PLANNING
Case Management Discharge Planning    Admission Date: 7/5/2024  GMLOS: 5  ALOS: 10    6-Clicks ADL Score: 8  6-Clicks Mobility Score: 10  PT and/or OT Eval ordered: Yes  Post-acute Referrals Ordered: Yes  Post-acute Choice Obtained: Yes  Has referral(s) been sent to post-acute provider:  Yes      Anticipated Discharge Dispo: Discharge Disposition: D/T to Medicaid only Nursing home (64)    DME Needed: No    Action(s) Taken: Updated Provider/Nurse on Discharge Plan  Pt was discussed during morning rounds. Per Dr. Casey Pt is medically cleared to go to SNF. Pt has been accepted at [facility] and has a bed for Pt today. RN Gonzalez met Pt at bedside and agreeable to go to SNF. Choice form obtained and received verbal approval for [facility]. Faxed to [facility] to save in media.    RN GONZALEZ requested transport for 1400 through [employer]. Waiting for Confirmation.     PASRR [id number]  LOC [id number]      Transport confirmed at 1400 via [facility]. COBRA packet completed, given to bedside nurse.    Pt's mother, Brandi  informed.    1400- RN GONZALEZ received a communication from [facility] for med verification. Dr. Casey informed. Per Dr. Casey, SNF just needs to follow what's written in script. Irvin from [facility] informed.     Escalations Completed: Provider    Medically Clear: Yes    Next Steps: RN CM to continue to assist in Pt's discharge needs.     Barriers to Discharge: Transportation    Is the patient up for discharge tomorrow: No

## 2024-07-15 NOTE — THERAPY
"Speech Language Pathology   Daily Treatment     Patient Name: Ellie Sánchez  AGE:  33 y.o., SEX:  female  Medical Record #: [id number]  Date of Service: 7/15/2024      Precautions:  Precautions: Fall Risk, Swallow Precautions         Subjective  Pt up in chair eating breakfast upon entering room. Supportive family at bedside.      Assessment  PO residue noted on L side of mouth which pt was unable to feel; wet washcloth provided. Discussed recommendation for placement of PO (both solids and liquids) in R side of mouth to assist with oral contianment. Discussed recommendation for either lingual or finger sweep to L side of mouth to avoid pocketing; pt and family verbalized understanding.    No overt s/sx of aspiration noted with trials of soft and bite size/thin liquid breakfast. Pt feeding self independently with L hand. Mastication timely/functional. No oral residue noted as pt independently completing lingual sweep.    Pt endorsed nervousness for leaving hospital for SNF/rehab. Provided support and encouraged pt to remember her goal (to go home and walk her dog). Pt has supportive family who will be available at SNF. Pt with good insight into cognitive changes s/p CVA. Today, endorsed increased difficulty distinguishing between L and R and encouraged pt to make \"L\" with right hand if that helps to remember her right side easier. Pt implementing independently at end of session.       Clinical Impressions  Pt continues to present with an oral dysphagia s/p CVA. Recommend continuation of a soft and bite size/thin liquid diet. If pt does not transfer today, SLP to follow for dysphagia and cognitive speech therapy.      Recommendations  Treatment Completed: Dysphagia Treatment       Dysphagia Treatment  Diet Consistency: Soft/bite sized solids, thin liquids  Instrumentation: None indicated at this time  Medication: Whole with puree, Whole with liquid, As tolerated  Supervision: Assist with meal tray set up, " Encourage self-feeding  Positioning: Fully upright and midline during oral intake  Risk Management : Small bites/sips, Slow rate of intake, Monitor for left pocketing           SLP Treatment Plan  Treatment Plan: Dysphagia Treatment  SLP Frequency: 4x Per Week  Estimated Duration: Until Therapy Goals Met      Anticipated Discharge Needs  Discharge Recommendations: Recommend post-acute placement for additional speech therapy services prior to discharge home  Therapy Recommendations Upon DC: Dysphagia Training, Cognitive-Linguistic Training, Community Re-Integration, Patient / Family / Caregiver Education      Patient / Family Goals  Patient / Family Goal #1: water, sprite, to go home  Goal #1 Outcome: Progressing as expected  Short Term Goals  Short Term Goal # 1: 7/11 Patient will consume SB6/TN0 with no s/sx of airway invasion or respiratory decline given min cues for safe swallow precautions.  Goal Outcome # 1: Goal met, new goal added  Short Term Goal # 1 B : Patient will consume minced/moist solids, thin liquids with no s/sx of airway invasion or respiratory decline given min cues for safe swallow precautions.  Goal Outcome  # 1 B: Goal met  Short Term Goal # 2: Patient will complete OMEX x20 reps per session given min cues.  Goal Outcome # 2 : Other (see comments)  Short Term Goal # 3: Pt will complete functional memory tasks with >70% accuracy and min clinician cues  Goal Outcome  # 3: Other (see comments)  Short Term Goal # 4: Pt will complete functional problem solving tasks with >70% accuracy and min clinician cues  Goal Outcome  # 4: Other (see comments)      Hiwot Mike, SLP

## 2024-07-15 NOTE — CARE PLAN
Problem: Knowledge Deficit - Standard  Goal: Patient and family/care givers will demonstrate understanding of plan of care, disease process/condition, diagnostic tests and medications  Outcome: Progressing  Note: Discussed POC and medications with patient.  Patient verbalized understanding.       Problem: Pain - Standard  Goal: Alleviation of pain or a reduction in pain to the patient’s comfort goal  Outcome: Progressing  Note: POC regarding pain management discussed with pt.  Pt will be medicated for pain per MAR     The patient is Stable - Low risk of patient condition declining or worsening    Shift Goals  Clinical Goals: Monitor neuro status,comfort  Patient Goals: sleep  Family Goals: sleep

## 2024-07-15 NOTE — PROGRESS NOTES
Called and gave report to [facility]. PIV removed, Zio patch applied, personal belongings in patient bag. Patient left via gurney with [facility].

## 2024-07-15 NOTE — THERAPY
"Physical Therapy   Daily Treatment     Patient Name: Ellie Sánchez  Age:  33 y.o., Sex:  female  Medical Record #: [id number]  Today's Date: 7/15/2024     Precautions  Precautions: Fall Risk;Swallow Precautions  Comments: Lft side deficits, hypertonic    Assessment    The pt willing to participate w/PT, had a \"scary\" incident in the [place] where she became diaphoretic, dizzy, and nauseous. The pt w/Lft UE/LE tone, difficult to break. No active movement noted, family instructed on keeping fingers stretched to neutral and pt educted on the importance of knowing where her Lft UE is @ AAT's and to keep Lft LE extended vs hip/knee flex'd and abducted.   Today the pt required mod assist w/sup->sit transition. Once seated, could hold static sit w/CGA. STS from EOB mod assist, Min assist from w/c->// bar. Transfers mod assist going to her Lft side, pt fearful of actual transfer has been using the maulik steady. The pt following commands throughout her tx session.   Nrsg notified of pt's BP response during PT session: Supine 140/84->109/75 standing in // bars feeling dizzy.   The pt is an excellent rehab candidate w/good family support.   PT will cont to follow.     Plan    Treatment Plan Status: Continue Current Treatment Plan  Type of Treatment: Bed Mobility, Equipment, Family / Caregiver Training, Neuro Re-Education / Balance, Therapeutic Activities, Therapeutic Exercise  Treatment Frequency: Daily  Treatment Duration: Until Therapy Goals Met    DC Equipment Recommendations: Unable to determine at this time  Discharge Recommendations: Recommend post-acute placement for additional physical therapy services prior to discharge home (IPR)    Objective       07/15/24 1224   Charge Group   PT Therapeutic Activities (Units) 1   PT Neuromuscular Re-Education / Balance (Units) 1   PT Therapeutic Exercise (Units) 1   PT Self Care / Home Evaluation (Units) 1   Total Time Spent   PT Therapeutic Activities Time Spent (Mins) 20   PT " Neuromuscular Re-Education/Balance Time Spent (Mins) 15   PT Therapeutic Exercise Time Spent (Mins) 15   PT Self Care/Home Evaluation Time Spent (Mins) 15   PT Total Time Spent (Calculated) 65   Precautions   Precautions Fall Risk;Swallow Precautions   Comments Lft side deficits, hypertonic   Vitals   Vitals Comments BP's during PT session: Supine 140/84, Seated 126/91->128/90 Standing in // bars: 127/90->109/75 c/o dizziness. Nrsg notified   Pain 0 - 10 Group   Pain Rating Scale (NPRS) 0   Therapist Pain Assessment During Activity;Nurse Notified   Other Treatments   Other Treatments Provided Initiated tx session w/PROM/stretch Lft UE/ LE. The pt hypertonic w/no active movement noted. Rolling to don pj bottoms progressing to the EOB for static sit again working on PROM Lft UE. The pt managed 15 mins EOB  w/CGA . Improvement in tone in seated position, still no active movement. Long conversation w/pt's Aunt and mom regarding therapy expectations in the skilled setting and to advocate for IPR as the pt progresses. Educated them that the only way the pt is going to progress it to make sure she is getting the adequate amount of therapy. Instructed mom into looking into a ramp for w/c access into their home,she stated the pt could stay on the lower level when the time comes for her to come home.    Balance   Sitting Balance (Static) Fair -   Sitting Balance (Dynamic) Poor   Standing Balance (Static) Poor +   Standing Balance (Dynamic) Poor -   Weight Shift Sitting Fair   Weight Shift Standing Poor   Skilled Intervention Verbal Cuing   Comments standing in // bars   Bed Mobility    Supine to Sit Moderate Assist  (HOB elevated, use of railing to Rt side)   Sit to Supine   (pt left seated in w/c)   Scooting Minimal Assist  (seated)   Rolling Contact Guard Assist;Moderate Assist to Rt.  (to the Lft)   Skilled Intervention Verbal Cuing;Postural Facilitation;Compensatory Strategies   Comments HOB elevated for tone management    Gait Analysis   Gait Level Of Assist Unable to Participate   Comments Pre-gait in // bars working on wt shifting.   Functional Mobility   Sit to Stand Minimal Assist  (from w/c in // bars, mod assist from EOB)   Bed, Chair, Wheelchair Transfer Moderate Assist  (bed->w/c, going to her Lft side)   Transfer Method Stand Pivot   Skilled Intervention Verbal Cuing;Postural Facilitation;Compensatory Strategies   Comments The pt was taken to the // bars to work on STS, midline orientation using the mirror for feedback, and wt shifting for pre-gait. The pt tolerated 3 STS, getting BP's to explain why she struggled this morning w/diaphoresis, nausea, and dizziness. Nrsg notified of her BP's response w/mobility. The pt's STS are min assist in the // bars and mod assist from EOB. Transfers to her Lft side mod assist, pt fearful because she has been transferring using the maulik steady only.   6 Clicks Assessment - How much HELP from from another person do you currently need... (If the patient hasn't done an activity recently, how much help from another person do you think he/she would need if he/she tried?)   Turning from your back to your side while in a flat bed without using bedrails? 3   Moving from lying on your back to sitting on the side of a flat bed without using bedrails? 2   Moving to and from a bed to a chair (including a wheelchair)? 2   Standing up from a chair using your arms (e.g., wheelchair, or bedside chair)? 2   Walking in hospital room? 1   Climbing 3-5 steps with a railing? 1   6 clicks Mobility Score 11   Short Term Goals    Short Term Goal # 1 Patient will perform supine-sit, sit-supine with HOB raised with use of bed rail with CGA in 6 visits to progress with bed mobility   Goal Outcome # 1 goal not met   Short Term Goal # 2 Patient will perform sit-stand with LRAD with CGA in 6 visits to progress with functional mobility   Goal Outcome # 2 Goal not met   Short Term Goal # 3 Patient will perform chair  transfers with LRAD with Min A in 6 visits to progress with OOB to chair for meals   Goal Outcome # 3 Goal not met   Education Group   Role of Physical Therapist Patient Response Patient;Family;Acceptance;Explanation;Verbal Demonstration;Action Demonstration   Physical Therapy Treatment Plan   Physical Therapy Treatment Plan Continue Current Treatment Plan   Anticipated Discharge Equipment and Recommendations   DC Equipment Recommendations Unable to determine at this time   Discharge Recommendations Recommend post-acute placement for additional physical therapy services prior to discharge home  (IPR)   Interdisciplinary Plan of Care Collaboration   IDT Collaboration with  Family / Caregiver;Nursing   Patient Position at End of Therapy Seated;Call Light within Reach;Tray Table within Reach;Phone within Reach;Family / Friend in Room   Collaboration Comments Nrsg notified of pts tx efforts   Session Information   Date / Session Number  7/15--7 (1/5 7/21) PTA/1   Supervising Physical Therapist (PTA Treatments Only)   Supervising Physical Therapist Ellie Montoya

## 2024-07-15 NOTE — PROGRESS NOTES
Attempted to call [facility] to give report, no answer or opportunity to leave message. Will try again. [facility] transport set for 1400.

## 2024-07-15 NOTE — DISCHARGE SUMMARY
Discharge Summary    CHIEF COMPLAINT ON ADMISSION  Chief Complaint   Patient presents with    Sent by MD Walter     Transfer from [place] for further evaluation of unilateral weakness.        Reason for Admission  EMS    Admission Date  7/5/2024     CODE STATUS  Full Code    HPI & HOSPITAL COURSE  33 y.o. female who presented 7/5/2024 with a history of thrombocytosis, migraine, lupus, and rheumatoid arthritis.  She had been maintained on Plaquenil, and methotrexate for some time however the methotrexate was stopped, and the patient was started on Rinvoq recently.  Patient presented on July 5 after developing left-sided weakness suddenly which lasted about 30 minutes.  It resolved by the time she arrived to the emergency room.  She had CT imaging there which was interpreted as unremarkable. Here she had a recurrence of her symptoms.  Imaging was repeated, and she was found to have evidence of a right M2 thrombus.  Neurology was consulted, and the patient was given IV TNK and taken emergently to the IR suite.  There she underwent mechanical thrombectomy with TICI 2C flow postprocedure.  Patient recovered well afterwards.  Did have some chest pain but this was positional.  Antithrombin III and factor II and factor V Leiden were negative.  The theory is patient either has hypercoagulability related to her thrombocytosis and RA and SLE.  Or the other option is she might have occult A-fib so she will be sent with Zio patch.  Either way, she was started on anticoagulation and should continue this.    Therefore, she is discharged in good and stable condition to skilled nursing facility.    The patient met 2-midnight criteria for an inpatient stay at the time of discharge.      FOLLOW UP ITEMS POST DISCHARGE  1.  Also will need to check on Zio patch results after discharge.    DISCHARGE DIAGNOSES  Principal Problem:    Acute ischemic right MCA stroke (HCC) (POA: Yes)  Active Problems:    Anxiety (POA: Yes)     Mixed dyslipidemia (POA: Yes)    Thrombocytosis (POA: Yes)    SLE (systemic lupus erythematosus related syndrome) (HCC) (POA: Yes)    Hypomagnesemia (POA: Unknown)    Hypokalemia (POA: Unknown)    Hyperglycemia (POA: Unknown)    Anemia (POA: Unknown)    Acute cystitis without hematuria (POA: Unknown)  Resolved Problems:    * No resolved hospital problems. *      FOLLOW UP  Future Appointments   Date Time Provider Department Center   8/8/2024  1:40 PM Jennifer Ardon D.O. [facility]   8/22/2024 11:00 AM Preet Fernandez M.D. [facility] None   8/28/2024  1:00 PM Yanira Morales M.D. [facility] None   9/10/2024  1:45 PM Aydin Palacio M.D. [facility] None     [facility] [place] [id number] [facility] [id number]  [phone]          MEDICATIONS ON DISCHARGE     Medication List        START taking these medications        Instructions   apixaban 5mg Tabs  Commonly known as: Eliquis   Take 1 Tablet by mouth 2 times a day. Indications: Thromboembolism secondary to Atrial Fibrillation  Dose: 5 mg     aspirin 81 MG EC tablet  Start taking on: July 16, 2024   Take 1 Tablet by mouth every day.  Dose: 81 mg     baclofen 5 MG Tabs  Commonly known as: Lioresal   Take 1 Tablet by mouth 3 times a day.  Dose: 5 mg     escitalopram 10 MG Tabs  Start taking on: July 16, 2024  Commonly known as: Lexapro   Take 1 Tablet by mouth every day.  Dose: 10 mg     QUEtiapine 50 MG tablet  Commonly known as: SEROquel   Take 1 Tablet by mouth every evening.  Dose: 50 mg            CHANGE how you take these medications        Instructions   * HYDROcodone-acetaminophen 5-325 MG Tabs per tablet  What changed: Another medication with the same name was changed. Make sure you understand how and when to take each.  Commonly known as: Norco   1 pill by mouth once in a day with food as needed for severe joint pain and bodyaches.  Rx for 30 days.  Do not fill till 7/6/2024     * HYDROcodone-acetaminophen 5-325 MG Tabs per tablet  What changed:  when to take this  Commonly known as: Norco   Take 1 Tablet by mouth every 8 hours as needed (joint pain and body aches) for up to 2 days.  Dose: 1 Tablet     * HYDROcodone-acetaminophen 5-325 MG Tabs per tablet  Start taking on: August 5, 2024  What changed: Another medication with the same name was changed. Make sure you understand how and when to take each.  Commonly known as: Norco   1 pill by mouth once a day as needed for severe joint pain and bodyaches.  Rx for 30 days.  Do not fill till 8/5/2024.     * LORazepam 1 MG Tabs  What changed: Another medication with the same name was added. Make sure you understand how and when to take each.  Commonly known as: Ativan   Take 1 Tablet by mouth 1 time a day as needed for Anxiety (anxiousness) for up to 90 days. Do not fill till 6/8/24  Dose: 1 mg     * LORazepam 1 MG Tabs  What changed: You were already taking a medication with the same name, and this prescription was added. Make sure you understand how and when to take each.  Commonly known as: Ativan   Take 1 Tablet by mouth 1 time a day as needed for Anxiety for up to 2 days.  Dose: 1 mg     rosuvastatin 5 MG Tabs  What changed: See the new instructions.  Commonly known as: Crestor   TAKE 1/2 TO 1 TABLET BY MOUTH EVERY EVENING AS DIRECTED           * This list has 5 medication(s) that are the same as other medications prescribed for you. Read the directions carefully, and ask your doctor or other care provider to review them with you.                CONTINUE taking these medications        Instructions   docusate sodium 100 MG Caps  Commonly known as: Colace   Take 100 mg by mouth 2 times a day.  Dose: 100 mg     folic acid 1 MG Tabs  Commonly known as: Folvite   TAKE TWO TABLETS BY MOUTH DAILY  Dose: 2 mg     gabapentin 300 MG Caps  Commonly known as: Neurontin   Take 1 Capsule by mouth 2 times a day.  Dose: 300 mg     hydroxychloroquine 200 MG Tabs  Commonly known as: Plaquenil   TAKE TWO TABLETS BY MOUTH  MONDAY-FRIDAY. TAKE ONE TABLET BY MOUTH DAILY ON SATURDAY AND SUNDAY     methotrexate 2.5 MG tablet   Take 8 Tablets by mouth every 7 days.  Dose: 20 mg     metoprolol SR 25 MG Tb24  Commonly known as: Toprol XL   25 mg every day.  Dose: 25 mg     metronidazole 0.75 % cream  Commonly known as: Metrocream   Apply 1 Application topically 2 times a day.  Dose: 1 Application     naproxen 500 MG Tabs  Commonly known as: Naprosyn   Take 1 Tablet by mouth 2 times daily with meals as needed (moderate pain).  Dose: 500 mg     phentermine 15 MG capsule   Take 1 Capsule by mouth every morning for 60 days.  Dose: 15 mg     Rinvoq 15 MG Tb24  Generic drug: Upadacitinib ER   Take 15 mg by mouth every day at 6 PM.  Dose: 15 mg            STOP taking these medications      acetaminophen/caffeine/butalbital 300-40-50 mg -40 MG Caps capsule  Commonly known as: Fioricet     amitriptyline 100 MG Tabs  Commonly known as: Elavil              Allergies  Allergies   Allergen Reactions    Pcn [Penicillins] Rash    Augmentin Diarrhea     diarrhea    Zithromax [Azithromycin] Rash     Rash       DIET  Orders Placed This Encounter   Procedures    Diet Order Diet: Level 6 - Soft and Bite Sized; Liquid level: Level 0 - Thin; Fluid modifications: (optional): 1500 ml Fluid Restriction; Tray Modifications (optional): SLP - 1:1 Supervision by Nursing     Standing Status:   Standing     Number of Occurrences:   1     Order Specific Question:   Diet:     Answer:   Level 6 - Soft and Bite Sized [23]     Order Specific Question:   Liquid level     Answer:   Level 0 - Thin     Order Specific Question:   Fluid modifications: (optional)     Answer:   1500 ml Fluid Restriction [9]     Order Specific Question:   Tray Modifications (optional)     Answer:   SLP - 1:1 Supervision by Nursing       ACTIVITY  As tolerated and directed by skilled nursing.  Weight bearing as tolerated    LINES, DRAINS, AND WOUNDS  This is an automated list. Peripheral IVs will  be removed prior to discharge.  Peripheral IV 07/12/24 20 G Anterior;Right;Distal Forearm (Active)   Site Assessment Clean;Dry;Intact 07/14/24 2300   Dressing Type Transparent 07/14/24 2300   Line Status Flushed;Scrubbed the hub prior to access;Saline locked 07/14/24 2300   Dressing Status Clean;Dry;Intact 07/14/24 2300   Dressing Intervention N/A 07/14/24 2300   Dressing Change Due 07/13/24 07/12/24 1047   NEXT Primary Tubing Change  07/13/24 07/12/24 1047   Date IV Connector(s) Changed 07/13/24 07/12/24 1047   Infiltration Grading ([facility], [facility]) 0 07/14/24 2300   Phlebitis Scale ([facility] Only) 0 07/14/24 2300     External Urinary Catheter (Female Wick) (Active)   Collection Container Suction container 07/14/24 0800   Suction Level (Female Wick Catheter) 60 mmHg 07/12/24 1200   Output (mL) 200 mL 07/12/24 1600         Peripheral IV 07/12/24 20 G Anterior;Right;Distal Forearm (Active)   Site Assessment Clean;Dry;Intact 07/14/24 2300   Dressing Type Transparent 07/14/24 2300   Line Status Flushed;Scrubbed the hub prior to access;Saline locked 07/14/24 2300   Dressing Status Clean;Dry;Intact 07/14/24 2300   Dressing Intervention N/A 07/14/24 2300   Dressing Change Due 07/13/24 07/12/24 1047   NEXT Primary Tubing Change  07/13/24 07/12/24 1047   Date IV Connector(s) Changed 07/13/24 07/12/24 1047   Infiltration Grading ([facility], [facility]) 0 07/14/24 2300   Phlebitis Scale ([facility] Only) 0 07/14/24 2300               MENTAL STATUS ON TRANSFER             CONSULTATIONS  Neurologist Dr. Fernandez   critical care Dr. Dillard  Critical care Dr. Gonda   rehab Dr. Lovell   neurosurgeon Dr. Razo  Oncologist Dr. Patton  Hospitalist Dr. Vilchis    PROCEDURES  Findings: Right MCA M2/3 parietal branch occlusion. Final recanalization score TICI 2C.  Procedure(s): Cerebral angiogram and mechanical thrombectomy    Date: 7/6/2024  Time: 1300  Procedure: Central Venous Line placement  Site: R IJ vein    LABORATORY  Lab Results   Component Value  Date    SODIUM 138 07/15/2024    POTASSIUM 4.2 07/15/2024    CHLORIDE 103 07/15/2024    CO2 22 07/15/2024    GLUCOSE 102 (H) 07/15/2024    BUN 11 07/15/2024    CREATININE 0.52 07/15/2024    GLOMRATE 95 10/31/2023        Lab Results   Component Value Date    WBC 9.5 07/15/2024    HEMOGLOBIN 13.0 07/15/2024    HEMATOCRIT 39.6 07/15/2024    PLATELETCT 466 (H) 07/15/2024        Total time of the discharge process exceeds 31 minutes.

## 2024-07-15 NOTE — DISCHARGE PLANNING
[id number]  Agency/Facility Name: [facility]  Spoke To: Irvin via teams  Outcome: DPA inquired follow up on pending SNF, per Irvin can accept pt today for 1400.  Advised RN GONZALEZ via teams

## 2024-08-12 ENCOUNTER — TELEPHONE (OUTPATIENT)
Dept: CARDIOLOGY | Facility: MEDICAL CENTER | Age: 34
End: 2024-08-12
Payer: MEDICAID

## 2024-08-12 PROCEDURE — 93228 REMOTE 30 DAY ECG REV/REPORT: CPT | Performed by: INTERNAL MEDICINE

## 2024-08-12 NOTE — TELEPHONE ENCOUNTER
ZioAT EOS to ADD BD's nurse, Ethyl, on 8/12/2024    Symptomatic SVT    Preliminary findings:    1 episode of SVT  with an avg rate of 183 bpm    Sinus rhythm  with an avg rate of 88 bpm    Rare isolated supraventricular ectopy    No ventricular ectopy noted    10 patient events:  SVT   SR     Last office visit: 1/11/19 with SELAM

## 2024-08-22 ENCOUNTER — APPOINTMENT (OUTPATIENT)
Dept: NEUROLOGY | Facility: MEDICAL CENTER | Age: 34
End: 2024-08-22
Attending: PSYCHIATRY & NEUROLOGY
Payer: MEDICAID

## 2024-10-04 ENCOUNTER — TELEPHONE (OUTPATIENT)
Dept: NEUROLOGY | Facility: MEDICAL CENTER | Age: 34
End: 2024-10-04
Payer: MEDICAID

## 2024-10-17 ENCOUNTER — APPOINTMENT (OUTPATIENT)
Dept: NEUROLOGY | Facility: MEDICAL CENTER | Age: 34
End: 2024-10-17
Attending: PSYCHIATRY & NEUROLOGY
Payer: MEDICAID